# Patient Record
Sex: MALE | Race: WHITE | Employment: PART TIME | URBAN - METROPOLITAN AREA
[De-identification: names, ages, dates, MRNs, and addresses within clinical notes are randomized per-mention and may not be internally consistent; named-entity substitution may affect disease eponyms.]

---
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
## 2020-01-07 ENCOUNTER — OFFICE VISIT (OUTPATIENT)
Dept: FAMILY MEDICINE CLINIC | Age: 20
End: 2020-01-07
Payer: COMMERCIAL

## 2020-01-07 VITALS
SYSTOLIC BLOOD PRESSURE: 110 MMHG | WEIGHT: 119 LBS | HEIGHT: 65 IN | OXYGEN SATURATION: 99 % | BODY MASS INDEX: 19.83 KG/M2 | HEART RATE: 68 BPM | RESPIRATION RATE: 16 BRPM | DIASTOLIC BLOOD PRESSURE: 74 MMHG

## 2020-01-07 PROCEDURE — G8427 DOCREV CUR MEDS BY ELIG CLIN: HCPCS | Performed by: FAMILY MEDICINE

## 2020-01-07 PROCEDURE — G8420 CALC BMI NORM PARAMETERS: HCPCS | Performed by: FAMILY MEDICINE

## 2020-01-07 PROCEDURE — G8482 FLU IMMUNIZE ORDER/ADMIN: HCPCS | Performed by: FAMILY MEDICINE

## 2020-01-07 PROCEDURE — 1036F TOBACCO NON-USER: CPT | Performed by: FAMILY MEDICINE

## 2020-01-07 PROCEDURE — 99214 OFFICE O/P EST MOD 30 MIN: CPT | Performed by: FAMILY MEDICINE

## 2020-01-07 ASSESSMENT — PATIENT HEALTH QUESTIONNAIRE - PHQ9
SUM OF ALL RESPONSES TO PHQ QUESTIONS 1-9: 0
1. LITTLE INTEREST OR PLEASURE IN DOING THINGS: 0
2. FEELING DOWN, DEPRESSED OR HOPELESS: 0
SUM OF ALL RESPONSES TO PHQ QUESTIONS 1-9: 0
SUM OF ALL RESPONSES TO PHQ9 QUESTIONS 1 & 2: 0

## 2020-01-07 NOTE — PATIENT INSTRUCTIONS
irritable bowel syndrome recommendations: Investigate low-FODMAP diet.   Daily fiber supplement: Metamucil (psyllium)  Probiotic like Align daily for at least 4 weeks

## 2020-01-07 NOTE — PROGRESS NOTES
Ace Izquierdo MD, Hand Surgery (Hand, Wrist, Upper Extremity), [facility]    2. Irritable bowel syndrome with diarrhea  irritable bowel syndrome recommendations: Investigate low-FODMAP diet. Daily fiber supplement: Metamucil (psyllium)  Probiotic like Align daily for at least 4 weeks     If not improving, consider TCA (which we also discussed). Tends towards social anxiousness, which may be a result of the IBS or exacerbate it.       Plan:      F/u 6wks        Felix Ty MD

## 2020-02-03 ENCOUNTER — OFFICE VISIT (OUTPATIENT)
Dept: ORTHOPEDIC SURGERY | Age: 20
End: 2020-02-03
Payer: COMMERCIAL

## 2020-02-03 VITALS — HEIGHT: 65 IN | BODY MASS INDEX: 19.83 KG/M2 | WEIGHT: 119 LBS | RESPIRATION RATE: 16 BRPM

## 2020-02-03 PROCEDURE — G8482 FLU IMMUNIZE ORDER/ADMIN: HCPCS | Performed by: ORTHOPAEDIC SURGERY

## 2020-02-03 PROCEDURE — 99243 OFF/OP CNSLTJ NEW/EST LOW 30: CPT | Performed by: ORTHOPAEDIC SURGERY

## 2020-02-03 PROCEDURE — G8420 CALC BMI NORM PARAMETERS: HCPCS | Performed by: ORTHOPAEDIC SURGERY

## 2020-02-03 PROCEDURE — G8427 DOCREV CUR MEDS BY ELIG CLIN: HCPCS | Performed by: ORTHOPAEDIC SURGERY

## 2020-02-03 NOTE — LETTER
CONSENT TO OPERATION  AND/OR OTHER PROCEDURE(S)          PATIENT : David Carl   YOB: 2000      DATE : 2/3/20          1. I request and consent that Dr. Sammy Esteban,  and/or his associates or assistants perform an operation and/or procedures on the above patient at  [place] [address], to treat the condition(s) which appear indicated by the diagnostic studies already performed. I have been told that in general terms the nature, purpose and reasonable expectations of the operation and/or procedure(s) are:       right Scaphoid Non-Union Repair with Distal Radius Bone Graft       2. It has been explained to me by the informing physician that during the course of the operation and/or procedure(s) unforeseen conditions may be revealed that necessitate an extension of the original operation and/or procedure(s) or different operation and/or procedures than those set forth in Paragraph 1. I therefore authorize and request that my physician and/or his associates or assistants perform such operations and/or procedures as are necessary and desirable in the exercise of professional judgment. The authority granted under this Paragraph 2 shall extend to all conditions that require treatment and are known to my physician at the time the operation is commenced. 3. I have been made aware by the informing physician of certain risks and consequences that are associated with the operation and/or procedure(s) described in Paragraph 1, the reasonable alternative methods or treatment, the possible consequences, the possibility that the operation and/or procedure(s) may be unsuccessful and the possibility of complications. I understand the reasonably known risks to be:      ? Bleeding  ? Infection  ? Poor Healing  ? Possible Damage to Nerve, Vessel, Tendon/Muscle or Bone  ? Need for further Treatment/Surgery  ? Stiffness  ? Pain  ?  Residual or Recurrent Symptoms chronic conditions. Narcotics are never prescribed for use longer than one week at a time. Refills are only granted in unusual circumstances and only at Dr. Yasir Mcdowell discretion. Patients who are receiving narcotic medication from another physician or who are under pain management contracts will not be given a prescription for narcotics for any reason. I have read the above policies and understand that by agreeing to proceed with treatment by Dr. Dimple Weber and his team, that I am agreeing to abide by these policies.     Patient Name:  Lucien Cobian    Patient Signature:  _____________________________    [facility] Date:   2/3/20

## 2020-02-03 NOTE — LETTER
[address] [facility] - HAND SURGERY  Surgery Scheduling Form:      DEMOGRAPHICS:                                                                                                              .    Patient Name:  Brock Link  Patient :  2000   Patient SS#:  [id number]    Patient Phone:  [phone] (home)  Alt. Patient Phone:    Patient Address:  [address] [place] [address]    PCP:  Gerardo Rodriguez MD  Payor/Plan Subscr  Sex Relation Sub. Ins. ID Effective Group Num   1. [employer] Necessary [birth year] Male Self [id number] [address] [facility] [address]     DIAGNOSIS & PROCEDURE:                                                                                            .    Diagnosis:   right Scaphoid Non-Union  Operation:  right Scaphoid Non-Union Repair with Distal Radius Bone Graft  [CPT: 41455]  Location:  [facility]  Surgeon:  Susan Cornelius    SCHEDULING INFORMATION:                                                                                         .    Surgeon's Scheduling Instruction:  elective    RN Post-op Appt:  [] Yes   [x] No  Preferred Thursday:   [x] Yes   [] No    Requested Date:  3-26-20  OR Time:  12:30 Patient Arrival Time:  11:00  OR Time Required:  75  Minutes  Anesthesia:  General  Equipment:  Accutrack Screws (Mini & Standard), Carbon Osteotomes, K-Wires, TPS  Mini C-Arm:  Yes   Standard C-Arm:  Yes  Status:  outpatient  PAT Required:  Yes  Comments:                      Erin Ruelas. Julienne Azevedo MD  2/3/20     BILLING INFORMATION:                                                                                                    .    Procedure:       CPT Code Modifier  right Scaphoid Non-Union Repair with Distal Radius Bone Graft      .                Pre Operative Physician Prophylaxis Orders - SCIP Protocols      Pre-Operative Antibiotic Order:    No Known Allergies       [x]  ----  No Antibiotic Ordered []  ----  Give the following Antibiotic within 1 hour prior to start time:         Ancef 1 gram IV if patient is less than 200 pounds    or       Ancef 2 grams IV if patient is greater than 200 pounds    or      Vancomycin 1 gram IV (over 1 hour) if patient is allergic to           PENICILLINS or CEFALOSPORINS            Procedure: right Scaphoid Non-Union Repair with Distal Radius Bone Graft   SURG: 3-26-20    Patient: Soraida Travis  :    2000    Physician Signature:     Date: 2/3/20  Time: 3:24 PM

## 2020-02-04 ENCOUNTER — TELEPHONE (OUTPATIENT)
Dept: ORTHOPEDIC SURGERY | Age: 20
End: 2020-02-04

## 2020-02-04 NOTE — PROGRESS NOTES
The Volar Tubercle of the scaphoid is mild painful to palpation, The Anatomic Snuff-Box is mild painful to palpation. The distal radius/radial styloid is not tender to palpation. The distal ulna/ulnar styloid/DRUJ is not tender to palpation. There is not clinical evidence of skeletal deformity or mal-alignment. Radiographic Evaluation:  Radiographs are reviewed  today (3 views of the right wrist & a scaphoid view). They demonstrate evidence of an established cystic degenerative nonunion of the Mid-Waist of the scaphoid. The distal radius & ulna does not demonstrate evidence of a fracture. There is not evidence of other injury or bony fracture. 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 Plan:    I have discussed with Mr. Amanda Duncan the various treatment options for treatment of right scaphoid nonunion. We discussed the options of Closed treatment in situ with thumb-spica casting and Open repair of the nonunion with distal radial bone graft & Internal Fixation. We discussed the expected healing time and probability related to each treatment modality. We specifically discussed the anatomic considerations relevant to scaphoid fractures/nonunions, touching on the limited vascularity, possibility of avascular necrosis, and the impact that smoking has on bone healing. I have explained the pre-, phoebe- ane post-operative considerations to him.  he has elected to proceed with Open repair of the nonunion with distal radial bone graft & Internal Fixation of his right scaphoid. He has voiced an understanding of the other options available to him. I have explained the complications, limitations, expectations, alternatives, & risks of his chosen treatment.   We discussed the possibility of residual symptoms as may be related to surgical treatment of established nonunions including the possibilities of: mal-union, non-union, delayed union, persistent deformity, persistent pain, limitation of motion, future arthritic symptoms & the possible need for further treatment. We also specifically discussed risks related to any surgical procedure including: bleeding, infection, scar formation, & possible need for repeat operation. He understood our discussion and was comfortable with his decision; he was provided with appropriate expectations. I had an extensive discussion with Mr. Zakiya Villareal  and any family members present regarding the natural history, etiology, and long term consequences of this problem. I have outlined a treatment plan with them and, in my opinion, surgical intervention is indicated at this time. I have discussed with them the potential complications, limitations, expectations, alternatives, and risks of the procedure. He has had full opportunity to ask their questions. I have answered them all to his satisfaction. I feel that the patient and any present family members do understand our discussion today and he has provided informed consent for Open repair of the nonunion with distal radial bone graft & Internal Fixation of his  right scaphoid nonunion. He is specifically instructed to contact the office between now & his scheduled appointment if he has concerns related to the cast or the underlying fracture. He is welcome to call for an appointment sooner if he has any additional concerns or questions.

## 2020-02-18 ENCOUNTER — OFFICE VISIT (OUTPATIENT)
Dept: FAMILY MEDICINE CLINIC | Age: 20
End: 2020-02-18
Payer: COMMERCIAL

## 2020-02-18 VITALS
SYSTOLIC BLOOD PRESSURE: 110 MMHG | OXYGEN SATURATION: 98 % | WEIGHT: 122 LBS | HEART RATE: 65 BPM | BODY MASS INDEX: 20.33 KG/M2 | HEIGHT: 65 IN | DIASTOLIC BLOOD PRESSURE: 68 MMHG

## 2020-02-18 PROCEDURE — 99214 OFFICE O/P EST MOD 30 MIN: CPT | Performed by: FAMILY MEDICINE

## 2020-02-18 PROCEDURE — G8482 FLU IMMUNIZE ORDER/ADMIN: HCPCS | Performed by: FAMILY MEDICINE

## 2020-02-18 PROCEDURE — G8427 DOCREV CUR MEDS BY ELIG CLIN: HCPCS | Performed by: FAMILY MEDICINE

## 2020-02-18 PROCEDURE — 1036F TOBACCO NON-USER: CPT | Performed by: FAMILY MEDICINE

## 2020-02-18 PROCEDURE — G8420 CALC BMI NORM PARAMETERS: HCPCS | Performed by: FAMILY MEDICINE

## 2020-02-18 NOTE — PROGRESS NOTES
Subjective:      Patient ID: Rabia Rebollar is a 23 y.o. male. Blood pressure 110/68, pulse 65, height 5' 5\" (1.651 m), weight 122 lb (55.3 kg), SpO2 98 %. HPI Here for routine follow up of gi issues. Dx with IBS officially years ago. Maybe age 9. At last visit we discussed low FODMAP diet and recommended fiber and probiotic therapy. He reports that he has been using fiber and probiotic ([employer] brand). He does not feel foods are a trigger for his symptoms. 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 He worries that he will not be able to find a restroom when he needs one. Helps to 'get my mind off' it. Sleep at night is 'mostly good.'  occ insomnia at 4 am (2-3 times a week). Uses melatonin. No other OTC meds. No alcohol use  No tobacco or drug use. Patient Active Problem List   Diagnosis    Irritable bowel syndrome with diarrhea      Body mass index is 20.3 kg/m². Wt Readings from Last 3 Encounters:   02/18/20 122 lb (55.3 kg) (5 %, Z= -1.61)*   02/03/20 119 lb (54 kg) (4 %, Z= -1.81)*   01/07/20 119 lb (54 kg) (4 %, Z= -1.79)*     * Growth percentiles are based on CDC (Boys, 2-20 Years) data. BP Readings from Last 3 Encounters:   02/18/20 110/68   01/07/20 110/74   10/29/19 128/76      No current outpatient medications on file. No current facility-administered medications for this visit.        Immunization History   Administered Date(s) Administered    DTP/HiB 2000    DTaP vaccine 2000, 02/26/2001, 05/01/2001, 08/01/2001, 08/02/2002, 03/20/2006    HPV 9-valent Nory Pacheco) 10/31/2018, 07/12/2019    Hepatitis A Ped/Adol (Havrix, Vaqta) 10/31/2018, 07/12/2019    Hepatitis B Ped/Adol (Engerix-B, Recombivax HB) 2000, 2000, 2000, 05/01/2001    Hib vaccine 2000, 02/26/2001, 05/01/2001, 01/27/2002    Influenza A ([id number]) Vaccine PF IM

## 2020-03-09 ENCOUNTER — OFFICE VISIT (OUTPATIENT)
Dept: FAMILY MEDICINE CLINIC | Age: 20
End: 2020-03-09
Payer: COMMERCIAL

## 2020-03-09 VITALS
BODY MASS INDEX: 20.49 KG/M2 | OXYGEN SATURATION: 98 % | SYSTOLIC BLOOD PRESSURE: 108 MMHG | WEIGHT: 123 LBS | DIASTOLIC BLOOD PRESSURE: 60 MMHG | HEIGHT: 65 IN | TEMPERATURE: 98.3 F | HEART RATE: 74 BPM

## 2020-03-09 PROCEDURE — 99242 OFF/OP CONSLTJ NEW/EST SF 20: CPT | Performed by: FAMILY MEDICINE

## 2020-03-09 PROCEDURE — G8420 CALC BMI NORM PARAMETERS: HCPCS | Performed by: FAMILY MEDICINE

## 2020-03-09 PROCEDURE — G8482 FLU IMMUNIZE ORDER/ADMIN: HCPCS | Performed by: FAMILY MEDICINE

## 2020-03-09 PROCEDURE — G8427 DOCREV CUR MEDS BY ELIG CLIN: HCPCS | Performed by: FAMILY MEDICINE

## 2020-03-09 ASSESSMENT — ENCOUNTER SYMPTOMS
GASTROINTESTINAL NEGATIVE: 1
RESPIRATORY NEGATIVE: 1
EYES NEGATIVE: 1
ALLERGIC/IMMUNOLOGIC NEGATIVE: 1

## 2020-03-09 NOTE — Clinical Note
Cleared for surgery. He has some questions about his recovery time and I did not know how to answer him. I referred him to the ortho office to see if your staff is in this morning. Thanks.   JAH

## 2020-03-09 NOTE — PROGRESS NOTES
Gastrointestinal: Negative. Endocrine: Negative. Genitourinary: Negative. Musculoskeletal: Negative. Skin: Negative. Allergic/Immunologic: Negative. Neurological: Negative. Hematological: Negative. Psychiatric/Behavioral: Negative. Objective:     Vitals:    03/09/20 1024   BP: 108/60   Site: Left Upper Arm   Position: Sitting   Cuff Size: Medium Adult   Pulse: 74   Temp: 98.3 °F (36.8 °C)   TempSrc: Oral   SpO2: 98%   Weight: 123 lb (55.8 kg)   Height: 5' 5\" (1.651 m)     Physical Exam  Vitals signs and nursing note reviewed. Constitutional:       General: He is not in acute distress. Appearance: Normal appearance. He is well-developed. He is not diaphoretic. HENT:      Head: Normocephalic and atraumatic. Right Ear: Tympanic membrane, ear canal and external ear normal.      Left Ear: Tympanic membrane, ear canal and external ear normal.      Nose: Nose normal. No congestion or rhinorrhea. Mouth/Throat:      Mouth: Mucous membranes are moist.      Pharynx: Oropharynx is clear. No oropharyngeal exudate or posterior oropharyngeal erythema. Eyes:      General: No scleral icterus. Right eye: No discharge. Left eye: No discharge. Conjunctiva/sclera: Conjunctivae normal.      Pupils: Pupils are equal, round, and reactive to light. Neck:      Musculoskeletal: Normal range of motion and neck supple. Cardiovascular:      Rate and Rhythm: Normal rate and regular rhythm. Heart sounds: Normal heart sounds. No murmur. Pulmonary:      Effort: Pulmonary effort is normal. No respiratory distress. Breath sounds: Normal breath sounds. No wheezing or rales. Abdominal:      General: Bowel sounds are normal. There is no distension. Palpations: Abdomen is soft. There is no mass. Tenderness: There is no abdominal tenderness. There is no guarding or rebound. Hernia: No hernia is present.    Musculoskeletal:         General: No swelling. Right lower leg: No edema. Left lower leg: No edema. Lymphadenopathy:      Cervical: No cervical adenopathy. Skin:     General: Skin is warm and dry. Capillary Refill: Capillary refill takes less than 2 seconds. Neurological:      General: No focal deficit present. Mental Status: He is alert and oriented to person, place, and time. Mental status is at baseline. Psychiatric:         Mood and Affect: Mood normal.         Behavior: Behavior normal.         Thought Content: Thought content normal.         Judgment: Judgment normal.         Predictors of intubation difficulty:   Morbid obesity? no   Anatomically abnormal facies? no   Short, thick neck? no   Neck range of motion: normal   Dentition: No chipped, loose, or missing teeth. Lab Review   No visits with results within 2 Month(s) from this visit.    Latest known visit with results is:   Admission on 01/20/2018, Discharged on 01/20/2018   Component Date Value    WBC 01/20/2018 8.9     RBC 01/20/2018 4.86     Hemoglobin 01/20/2018 15.0     Hematocrit 01/20/2018 43.6     MCV 01/20/2018 89.8     MCH 01/20/2018 30.9     MCHC 01/20/2018 34.4     RDW 01/20/2018 13.0     Platelets 80/05/3614 224     MPV 01/20/2018 8.4     Sodium 01/20/2018 140     Potassium 01/20/2018 3.5     Chloride 01/20/2018 103     CO2 01/20/2018 25     Anion Gap 01/20/2018 12     Glucose 01/20/2018 106*    BUN 01/20/2018 10     CREATININE 01/20/2018 0.8     GFR Non- 01/20/2018 >60     GFR  01/20/2018 >60     Calcium 01/20/2018 9.5     Total Protein 01/20/2018 7.6     Alb 01/20/2018 4.7     Albumin/Globulin Ratio 01/20/2018 1.6     Total Bilirubin 01/20/2018 0.9     Alkaline Phosphatase 01/20/2018 83     ALT 01/20/2018 15     AST 01/20/2018 16     Globulin 01/20/2018 2.9     Lipase 01/20/2018 22.0     Urine Type 01/20/2018 Voided     Occult Blood Diagnostic 01/20/2018

## 2020-03-11 ENCOUNTER — ANESTHESIA EVENT (OUTPATIENT)
Dept: OPERATING ROOM | Age: 20
End: 2020-03-11
Payer: COMMERCIAL

## 2020-03-11 NOTE — PROGRESS NOTES
Phone message left to call [facility] dept at [phone]  for history review and surgery instructions. Electronically signed by Veronica Sweet RN on 3/11/20 at 9:43 AM EDT

## 2020-03-12 ENCOUNTER — ANESTHESIA (OUTPATIENT)
Dept: OPERATING ROOM | Age: 20
End: 2020-03-12
Payer: COMMERCIAL

## 2020-03-12 ENCOUNTER — HOSPITAL ENCOUNTER (OUTPATIENT)
Age: 20
Setting detail: OUTPATIENT SURGERY
Discharge: HOME | End: 2020-03-12
Attending: ORTHOPAEDIC SURGERY | Admitting: ORTHOPAEDIC SURGERY
Payer: COMMERCIAL

## 2020-03-12 ENCOUNTER — APPOINTMENT (OUTPATIENT)
Dept: GENERAL RADIOLOGY | Age: 20
End: 2020-03-12
Attending: ORTHOPAEDIC SURGERY
Payer: COMMERCIAL

## 2020-03-12 ENCOUNTER — TELEPHONE (OUTPATIENT)
Dept: ORTHOPEDIC SURGERY | Age: 20
End: 2020-03-12

## 2020-03-12 VITALS
SYSTOLIC BLOOD PRESSURE: 112 MMHG | HEIGHT: 65 IN | OXYGEN SATURATION: 96 % | TEMPERATURE: 98.2 F | DIASTOLIC BLOOD PRESSURE: 54 MMHG | BODY MASS INDEX: 20.28 KG/M2 | RESPIRATION RATE: 16 BRPM | HEART RATE: 74 BPM | WEIGHT: 121.69 LBS

## 2020-03-12 VITALS
TEMPERATURE: 96.8 F | OXYGEN SATURATION: 100 % | RESPIRATION RATE: 13 BRPM | DIASTOLIC BLOOD PRESSURE: 40 MMHG | SYSTOLIC BLOOD PRESSURE: 80 MMHG

## 2020-03-12 PROCEDURE — 6360000002 HC RX W HCPCS: Performed by: ORTHOPAEDIC SURGERY

## 2020-03-12 PROCEDURE — 7100000010 HC PHASE II RECOVERY - FIRST 15 MIN: Performed by: ORTHOPAEDIC SURGERY

## 2020-03-12 PROCEDURE — 7100000001 HC PACU RECOVERY - ADDTL 15 MIN: Performed by: ORTHOPAEDIC SURGERY

## 2020-03-12 PROCEDURE — C1713 ANCHOR/SCREW BN/BN,TIS/BN: HCPCS | Performed by: ORTHOPAEDIC SURGERY

## 2020-03-12 PROCEDURE — 2720000010 HC SURG SUPPLY STERILE: Performed by: ORTHOPAEDIC SURGERY

## 2020-03-12 PROCEDURE — 2580000003 HC RX 258: Performed by: ORTHOPAEDIC SURGERY

## 2020-03-12 PROCEDURE — 2500000003 HC RX 250 WO HCPCS: Performed by: NURSE ANESTHETIST, CERTIFIED REGISTERED

## 2020-03-12 PROCEDURE — 2709999900 HC NON-CHARGEABLE SUPPLY: Performed by: ORTHOPAEDIC SURGERY

## 2020-03-12 PROCEDURE — 7100000000 HC PACU RECOVERY - FIRST 15 MIN: Performed by: ORTHOPAEDIC SURGERY

## 2020-03-12 PROCEDURE — 3600000015 HC SURGERY LEVEL 5 ADDTL 15MIN: Performed by: ORTHOPAEDIC SURGERY

## 2020-03-12 PROCEDURE — 3700000000 HC ANESTHESIA ATTENDED CARE: Performed by: ORTHOPAEDIC SURGERY

## 2020-03-12 PROCEDURE — 6360000002 HC RX W HCPCS: Performed by: ANESTHESIOLOGY

## 2020-03-12 PROCEDURE — 3209999900 FLUORO FOR SURGICAL PROCEDURES

## 2020-03-12 PROCEDURE — C1769 GUIDE WIRE: HCPCS | Performed by: ORTHOPAEDIC SURGERY

## 2020-03-12 PROCEDURE — 2580000003 HC RX 258: Performed by: ANESTHESIOLOGY

## 2020-03-12 PROCEDURE — 3600000005 HC SURGERY LEVEL 5 BASE: Performed by: ORTHOPAEDIC SURGERY

## 2020-03-12 PROCEDURE — 6360000002 HC RX W HCPCS: Performed by: NURSE ANESTHETIST, CERTIFIED REGISTERED

## 2020-03-12 PROCEDURE — 3700000001 HC ADD 15 MINUTES (ANESTHESIA): Performed by: ORTHOPAEDIC SURGERY

## 2020-03-12 PROCEDURE — 7100000011 HC PHASE II RECOVERY - ADDTL 15 MIN: Performed by: ORTHOPAEDIC SURGERY

## 2020-03-12 DEVICE — 25.0MM STANDARD ACUTRAK® FIXATION SCREW
Type: IMPLANTABLE DEVICE | Site: WRIST | Status: FUNCTIONAL
Brand: ACUMED

## 2020-03-12 RX ORDER — PROPOFOL 10 MG/ML
INJECTION, EMULSION INTRAVENOUS PRN
Status: DISCONTINUED | OUTPATIENT
Start: 2020-03-12 | End: 2020-03-12 | Stop reason: SDUPTHER

## 2020-03-12 RX ORDER — SODIUM CHLORIDE 0.9 % (FLUSH) 0.9 %
10 SYRINGE (ML) INJECTION PRN
Status: DISCONTINUED | OUTPATIENT
Start: 2020-03-12 | End: 2020-03-12 | Stop reason: HOSPADM

## 2020-03-12 RX ORDER — HYDROCODONE BITARTRATE AND ACETAMINOPHEN 5; 325 MG/1; MG/1
1 TABLET ORAL EVERY 6 HOURS PRN
Qty: 28 TABLET | Refills: 0 | Status: SHIPPED | OUTPATIENT
Start: 2020-03-12 | End: 2020-03-19

## 2020-03-12 RX ORDER — OXYCODONE HYDROCHLORIDE AND ACETAMINOPHEN 5; 325 MG/1; MG/1
2 TABLET ORAL PRN
Status: DISCONTINUED | OUTPATIENT
Start: 2020-03-12 | End: 2020-03-12 | Stop reason: HOSPADM

## 2020-03-12 RX ORDER — CEFAZOLIN SODIUM 1 G/3ML
INJECTION, POWDER, FOR SOLUTION INTRAMUSCULAR; INTRAVENOUS PRN
Status: DISCONTINUED | OUTPATIENT
Start: 2020-03-12 | End: 2020-03-12 | Stop reason: SDUPTHER

## 2020-03-12 RX ORDER — MIDAZOLAM HYDROCHLORIDE 1 MG/ML
INJECTION INTRAMUSCULAR; INTRAVENOUS PRN
Status: DISCONTINUED | OUTPATIENT
Start: 2020-03-12 | End: 2020-03-12 | Stop reason: SDUPTHER

## 2020-03-12 RX ORDER — DEXAMETHASONE SODIUM PHOSPHATE 4 MG/ML
INJECTION, SOLUTION INTRA-ARTICULAR; INTRALESIONAL; INTRAMUSCULAR; INTRAVENOUS; SOFT TISSUE PRN
Status: DISCONTINUED | OUTPATIENT
Start: 2020-03-12 | End: 2020-03-12 | Stop reason: SDUPTHER

## 2020-03-12 RX ORDER — LIDOCAINE HYDROCHLORIDE 20 MG/ML
INJECTION, SOLUTION EPIDURAL; INFILTRATION; INTRACAUDAL; PERINEURAL PRN
Status: DISCONTINUED | OUTPATIENT
Start: 2020-03-12 | End: 2020-03-12 | Stop reason: SDUPTHER

## 2020-03-12 RX ORDER — ONDANSETRON 2 MG/ML
4 INJECTION INTRAMUSCULAR; INTRAVENOUS
Status: COMPLETED | OUTPATIENT
Start: 2020-03-12 | End: 2020-03-12

## 2020-03-12 RX ORDER — OXYCODONE HYDROCHLORIDE AND ACETAMINOPHEN 5; 325 MG/1; MG/1
1 TABLET ORAL PRN
Status: DISCONTINUED | OUTPATIENT
Start: 2020-03-12 | End: 2020-03-12 | Stop reason: HOSPADM

## 2020-03-12 RX ORDER — PROMETHAZINE HYDROCHLORIDE 25 MG/ML
6.25 INJECTION, SOLUTION INTRAMUSCULAR; INTRAVENOUS ONCE
Status: COMPLETED | OUTPATIENT
Start: 2020-03-12 | End: 2020-03-12

## 2020-03-12 RX ORDER — SODIUM CHLORIDE 9 MG/ML
INJECTION, SOLUTION INTRAVENOUS CONTINUOUS
Status: DISCONTINUED | OUTPATIENT
Start: 2020-03-12 | End: 2020-03-12 | Stop reason: HOSPADM

## 2020-03-12 RX ORDER — ONDANSETRON 2 MG/ML
INJECTION INTRAMUSCULAR; INTRAVENOUS PRN
Status: DISCONTINUED | OUTPATIENT
Start: 2020-03-12 | End: 2020-03-12 | Stop reason: SDUPTHER

## 2020-03-12 RX ORDER — PHENYLEPHRINE HCL IN 0.9% NACL 1 MG/10 ML
SYRINGE (ML) INTRAVENOUS PRN
Status: DISCONTINUED | OUTPATIENT
Start: 2020-03-12 | End: 2020-03-12 | Stop reason: SDUPTHER

## 2020-03-12 RX ORDER — FENTANYL CITRATE 50 UG/ML
25 INJECTION, SOLUTION INTRAMUSCULAR; INTRAVENOUS EVERY 5 MIN PRN
Status: DISCONTINUED | OUTPATIENT
Start: 2020-03-12 | End: 2020-03-12 | Stop reason: HOSPADM

## 2020-03-12 RX ORDER — SODIUM CHLORIDE 0.9 % (FLUSH) 0.9 %
10 SYRINGE (ML) INJECTION EVERY 12 HOURS SCHEDULED
Status: DISCONTINUED | OUTPATIENT
Start: 2020-03-12 | End: 2020-03-12 | Stop reason: HOSPADM

## 2020-03-12 RX ORDER — FENTANYL CITRATE 50 UG/ML
INJECTION, SOLUTION INTRAMUSCULAR; INTRAVENOUS PRN
Status: DISCONTINUED | OUTPATIENT
Start: 2020-03-12 | End: 2020-03-12 | Stop reason: SDUPTHER

## 2020-03-12 RX ADMIN — LIDOCAINE HYDROCHLORIDE 80 MG: 20 INJECTION, SOLUTION EPIDURAL; INFILTRATION; INTRACAUDAL; PERINEURAL at 12:08

## 2020-03-12 RX ADMIN — CEFAZOLIN SODIUM 2000 MG: 1 INJECTION, POWDER, FOR SOLUTION INTRAMUSCULAR; INTRAVENOUS at 12:13

## 2020-03-12 RX ADMIN — SODIUM CHLORIDE: 9 INJECTION, SOLUTION INTRAVENOUS at 13:09

## 2020-03-12 RX ADMIN — FENTANYL CITRATE 25 MCG: 50 INJECTION, SOLUTION INTRAMUSCULAR; INTRAVENOUS at 14:10

## 2020-03-12 RX ADMIN — PROMETHAZINE HYDROCHLORIDE 6.25 MG: 25 INJECTION INTRAMUSCULAR; INTRAVENOUS at 15:43

## 2020-03-12 RX ADMIN — PROPOFOL 250 MG: 10 INJECTION, EMULSION INTRAVENOUS at 12:08

## 2020-03-12 RX ADMIN — HYDROMORPHONE HYDROCHLORIDE 0.5 MG: 1 INJECTION, SOLUTION INTRAMUSCULAR; INTRAVENOUS; SUBCUTANEOUS at 13:59

## 2020-03-12 RX ADMIN — SODIUM CHLORIDE: 9 INJECTION, SOLUTION INTRAVENOUS at 11:29

## 2020-03-12 RX ADMIN — Medication 25 MCG: at 13:09

## 2020-03-12 RX ADMIN — ONDANSETRON 4 MG: 2 INJECTION INTRAMUSCULAR; INTRAVENOUS at 12:43

## 2020-03-12 RX ADMIN — DEXAMETHASONE SODIUM PHOSPHATE 4 MG: 4 INJECTION, SOLUTION INTRAMUSCULAR; INTRAVENOUS at 12:16

## 2020-03-12 RX ADMIN — FENTANYL CITRATE 50 MCG: 50 INJECTION INTRAMUSCULAR; INTRAVENOUS at 12:08

## 2020-03-12 RX ADMIN — FENTANYL CITRATE 50 MCG: 50 INJECTION INTRAMUSCULAR; INTRAVENOUS at 12:13

## 2020-03-12 RX ADMIN — MIDAZOLAM 2 MG: 1 INJECTION INTRAMUSCULAR; INTRAVENOUS at 12:04

## 2020-03-12 RX ADMIN — Medication 25 MCG: at 13:08

## 2020-03-12 RX ADMIN — ONDANSETRON 4 MG: 2 INJECTION INTRAMUSCULAR; INTRAVENOUS at 14:00

## 2020-03-12 ASSESSMENT — PULMONARY FUNCTION TESTS
PIF_VALUE: 8
PIF_VALUE: 9
PIF_VALUE: 3
PIF_VALUE: 6
PIF_VALUE: 3
PIF_VALUE: 9
PIF_VALUE: 3
PIF_VALUE: 3
PIF_VALUE: 8
PIF_VALUE: 3
PIF_VALUE: 8
PIF_VALUE: 3
PIF_VALUE: 9
PIF_VALUE: 3
PIF_VALUE: 9
PIF_VALUE: 3
PIF_VALUE: 8
PIF_VALUE: 9
PIF_VALUE: 8
PIF_VALUE: 1
PIF_VALUE: 2
PIF_VALUE: 3
PIF_VALUE: 8
PIF_VALUE: 2
PIF_VALUE: 3
PIF_VALUE: 3
PIF_VALUE: 8
PIF_VALUE: 3
PIF_VALUE: 8
PIF_VALUE: 9
PIF_VALUE: 0
PIF_VALUE: 9
PIF_VALUE: 8
PIF_VALUE: 3
PIF_VALUE: 0
PIF_VALUE: 8
PIF_VALUE: 3
PIF_VALUE: 9
PIF_VALUE: 2
PIF_VALUE: 3
PIF_VALUE: 2
PIF_VALUE: 8
PIF_VALUE: 9
PIF_VALUE: 8
PIF_VALUE: 3
PIF_VALUE: 3
PIF_VALUE: 8
PIF_VALUE: 3
PIF_VALUE: 0
PIF_VALUE: 2
PIF_VALUE: 8
PIF_VALUE: 8
PIF_VALUE: 9
PIF_VALUE: 3

## 2020-03-12 ASSESSMENT — PAIN - FUNCTIONAL ASSESSMENT
PAIN_FUNCTIONAL_ASSESSMENT: 0-10
PAIN_FUNCTIONAL_ASSESSMENT: ACTIVITIES ARE NOT PREVENTED
PAIN_FUNCTIONAL_ASSESSMENT: PREVENTS OR INTERFERES SOME ACTIVE ACTIVITIES AND ADLS
PAIN_FUNCTIONAL_ASSESSMENT: ACTIVITIES ARE NOT PREVENTED
PAIN_FUNCTIONAL_ASSESSMENT: 0-10

## 2020-03-12 ASSESSMENT — PAIN DESCRIPTION - ONSET
ONSET: ON-GOING
ONSET: AWAKENED FROM SLEEP
ONSET: ON-GOING
ONSET: ON-GOING

## 2020-03-12 ASSESSMENT — PAIN DESCRIPTION - PAIN TYPE
TYPE: SURGICAL PAIN

## 2020-03-12 ASSESSMENT — PAIN DESCRIPTION - DESCRIPTORS
DESCRIPTORS: ACHING
DESCRIPTORS: ACHING;DISCOMFORT;SORE
DESCRIPTORS: ACHING
DESCRIPTORS: ACHING;SHARP
DESCRIPTORS: ACHING
DESCRIPTORS: ACHING

## 2020-03-12 ASSESSMENT — PAIN SCALES - GENERAL
PAINLEVEL_OUTOF10: 6
PAINLEVEL_OUTOF10: 4
PAINLEVEL_OUTOF10: 8
PAINLEVEL_OUTOF10: 4

## 2020-03-12 ASSESSMENT — PAIN DESCRIPTION - LOCATION
LOCATION: WRIST

## 2020-03-12 ASSESSMENT — PAIN DESCRIPTION - PROGRESSION
CLINICAL_PROGRESSION: GRADUALLY IMPROVING
CLINICAL_PROGRESSION: NOT CHANGED
CLINICAL_PROGRESSION: GRADUALLY IMPROVING

## 2020-03-12 ASSESSMENT — PAIN DESCRIPTION - ORIENTATION
ORIENTATION: RIGHT

## 2020-03-12 ASSESSMENT — ENCOUNTER SYMPTOMS: SHORTNESS OF BREATH: 0

## 2020-03-12 ASSESSMENT — PAIN DESCRIPTION - FREQUENCY
FREQUENCY: CONTINUOUS

## 2020-03-12 NOTE — LETTER
[facility]  [address] [address]  Phone: [phone]  Fax: [phone]    Winifred Mullins MD        March 13, 2020     Patient: Mauri Whitt   YOB: 2000   Date of Visit: 3/12/2020       To Whom it May Concern:    Kristie Wolff had right hand surgery on 3/12/2020. If you have any questions or concerns, please don't hesitate to call.     Sincerely,               Winifred Mullins MD

## 2020-03-12 NOTE — ANESTHESIA PRE PROCEDURE
98.3 °F (36.8 °C)   TempSrc:  Oral   SpO2:  98%   Weight: 123 lb (55.8 kg) 121 lb 11.1 oz (55.2 kg)   Height: 5' 5\" (1.651 m) 5' 5\" (1.651 m)                                              BP Readings from Last 3 Encounters:   03/12/20 131/72   03/09/20 108/60   02/18/20 110/68       NPO Status: Time of last liquid consumption: 2315                        Time of last solid consumption: 2315                        Date of last liquid consumption: 03/11/20                        Date of last solid food consumption: 03/11/20    BMI:   Wt Readings from Last 3 Encounters:   03/12/20 121 lb 11.1 oz (55.2 kg) (5 %, Z= -1.64)*   03/09/20 123 lb (55.8 kg) (6 %, Z= -1.56)*   02/18/20 122 lb (55.3 kg) (5 %, Z= -1.61)*     * Growth percentiles are based on CDC (Boys, 2-20 Years) data. Body mass index is 20.25 kg/m². CBC:   Lab Results   Component Value Date    WBC 8.9 01/20/2018    RBC 4.86 01/20/2018    HGB 15.0 01/20/2018    HCT 43.6 01/20/2018    MCV 89.8 01/20/2018    RDW 13.0 01/20/2018     01/20/2018       CMP:   Lab Results   Component Value Date     01/20/2018    K 3.5 01/20/2018     01/20/2018    CO2 25 01/20/2018    BUN 10 01/20/2018    CREATININE 0.8 01/20/2018    GFRAA >60 01/20/2018    AGRATIO 1.6 01/20/2018    LABGLOM >60 01/20/2018    GLUCOSE 106 01/20/2018    PROT 7.6 01/20/2018    CALCIUM 9.5 01/20/2018    BILITOT 0.9 01/20/2018    ALKPHOS 83 01/20/2018    AST 16 01/20/2018    ALT 15 01/20/2018       POC Tests: No results for input(s): POCGLU, POCNA, POCK, POCCL, POCBUN, POCHEMO, POCHCT in the last 72 hours.     Coags:   Lab Results   Component Value Date    PROTIME 11.7 01/20/2018    INR 1.04 01/20/2018       HCG (If Applicable): No results found for: PREGTESTUR, PREGSERUM, HCG, HCGQUANT     ABGs: No results found for: PHART, PO2ART, HED0KYT, DYG1DNY, BEART, S7OYKVEV     Type & Screen (If Applicable):  No results found for: LABABO, [address]    Anesthesia Evaluation  Patient summary

## 2020-03-12 NOTE — LETTER
[facility]  [address] [address]  Phone: [phone]  Fax: [phone]    Diane Eisenberg MD        March 13, 2020     Patient: Ankit Montes   YOB: 2000   Date of Visit: 3/12/2020       To Whom it May Concern:    Jimmy Chaudhry had right hand surgery on 3/12/2020. He may return to work with no use of right hand for 10-12 weeks after surgery. If you have any questions or concerns, please don't hesitate to call.     Sincerely,               Diane Eisenberg MD

## 2020-03-12 NOTE — ANESTHESIA POSTPROCEDURE EVALUATION
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        Anesthesia Post Evaluation    Patient location during evaluation: PACU  Patient participation: complete - patient participated  Level of consciousness: awake and alert  Airway patency: patent  Nausea & Vomiting: no nausea and no vomiting  Complications: no  Cardiovascular status: hemodynamically stable  Respiratory status: acceptable  Hydration status: stable

## 2020-03-12 NOTE — H&P
Pre-operative Update of H&P:    I  have seen & examined Mr. Stephanie Pham related solely to his hand and upper extremity conditions, prior to the scheduled procedure on the date of his surgery. The indications for the planned surgical procedure & and his upper-extremity condition are unchanged.

## 2020-03-13 NOTE — OP NOTE
anesthesia was  induced and the right upper extremity was prepped and draped in the  usual sterile fashion. After Esmarch exsanguination, the pneumatic tourniquet was inflated to  250 mmHg. A curvilinear incision was fashioned over the dorsal radial  aspect of the wrist incorporating the line of the scaphoid and over the  dorsum of the distal radius staying radial to Anton's tubercle. Dissection was carried very carefully through the subcutaneous tissues  identifying and protecting the neurovascular structures. Particularly,  the radial sensory nerve branches and subcutaneous veins were protected. The interval between the first and second extensor compartments was  identified and the 1,2-ICSRA (supraretinacular artery was identified as  the pedicle for the planned bone graft flap). This was meticulously  identified and carefully protected throughout the procedure to preserve  it. 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 It was significantly fibrous and  degenerative. This was all debrided back to stable healthy bone. Unfortunately, there was both proximal and distal punctate bleeding at  the conclusion of debridement. This left a large cavity within the mid  waist of the scaphoid. Attention was turned to the dorsum of the distal  radius along the course of the 1,2-ICSRA. The necessary bone graft was  identified and marked. It was harvested using K-wires and sharp  osteotomes retrieving a solid piece of cortical cancellous bone as a  pedicle flap which was raised. This was rotated on its pedicle and  contoured to fit the defect. It was then packed securely in the defect  and held in position provisionally with K-wires. Using an Acutrak standard  screw set, a guidewire was placed from proximal to distal down the  central axis of the scaphoid, checked an orthogonal radiographic views.    This was overrimmed to accommodate a 25 mm standard Acutrak screw which  was inserted under direct visual and radiographic guidance. Screw was  inserted to the planned depth gaining excellent purchase both proximally  and distally making sure not to displace or extrude the graft. With the  screw in place, excellent compression and fixation achieved. All  provisional wires were removed. Final fluoroscopic imaging was obtained  demonstrating excellent alignment and position of the graft. The  scaphoid was in a very good alignment. Hardware was all in appropriate  position and dimension. The wound was irrigated copiously with sterile  saline for irrigation and the pneumatic tourniquet deflated after a  period of 40 minutes elevation. Hemostasis was obtained with direct  pressure and electrocautery. The fingers were immediately pink and  well-perfused. The wound was closed in layers with interrupted  absorbable suture routinely. The wound was dressed with Adaptic, dry  sterile dressing, and a bulky short arm thumb spica splint was applied. The patient was awakened from anesthesia having tolerated the procedure  without apparent complication. He was returned to the recovery room in  stable condition. At the conclusion of the procedure, all needle, instruments, and sponge  counts were correct.         Corrina Ramirez MD    D: 03/12/2020 13:05:46       T: 03/12/2020 21:56:21     [id number]  Job#: [id number]     Doc#: [id number]    CC:

## 2020-03-17 ENCOUNTER — TELEPHONE (OUTPATIENT)
Dept: ORTHOPEDIC SURGERY | Age: 20
End: 2020-03-17

## 2020-03-17 NOTE — TELEPHONE ENCOUNTER
Spoke with patient. Patient describes possible bruising at knuckle. Advised that this can be normal. Also explained that the numbness he is experiencing is most likely from swelling putting pressure on the carpal tunnel. He is advised to elevate, take NSAIDS and wiggle fingers to help decrease swelling. Patient will call back if his symptoms worsen or fail to improve.  Will keep appointment for Friday with Crystal Franz

## 2020-03-17 NOTE — TELEPHONE ENCOUNTER
Pt is calling about his hand is numb today and it was fine yesterday. He wanted to make sure this was ok for it to be numb.

## 2020-03-20 ENCOUNTER — OFFICE VISIT (OUTPATIENT)
Dept: ORTHOPEDIC SURGERY | Age: 20
End: 2020-03-20
Payer: COMMERCIAL

## 2020-03-20 VITALS — RESPIRATION RATE: 16 BRPM | WEIGHT: 121 LBS | HEIGHT: 65 IN | BODY MASS INDEX: 20.16 KG/M2

## 2020-03-20 PROBLEM — S62.021A CLOSED DISPLACED FRACTURE OF MIDDLE THIRD OF NAVICULAR BONE OF RIGHT WRIST: Status: ACTIVE | Noted: 2020-03-20

## 2020-03-20 PROCEDURE — 29075 APPL CST ELBW FNGR SHORT ARM: CPT | Performed by: PHYSICIAN ASSISTANT

## 2020-03-20 PROCEDURE — 99024 POSTOP FOLLOW-UP VISIT: CPT | Performed by: PHYSICIAN ASSISTANT

## 2020-03-20 NOTE — PROGRESS NOTES
I applied a Short Arm Thumb-Spica Fiberglass Cast to Santhosh Barone's Right, Thumb. I applied casting stockinette,  1 rolls of padding in an overlapping fashion. Annalee Carrizales requested pink color fiberglass. I rolled 2 rolls of fiberglass in an overlapping fashion. His  Right, Thumb was maintained in a 0 degree Neutral Alignment. At the conclusion of the procedure, Tee Barone's nail beds were pink in color, the extremity is warm to the touch. Capillary refill is less than 2 seconds. Annalee Carrizales was instructed in proper care of cast.  Do not get wet, keep all items out of cast.  If cast is painful please make appointment to get checked. He was also briefed on circulation compromise. If digits are cold, blue, and tingling patient must must seek care. If after hours patient is to go to Emergency Room. During office hours patient must come in to office.

## 2020-03-23 ENCOUNTER — TELEPHONE (OUTPATIENT)
Dept: ORTHOPEDIC SURGERY | Age: 20
End: 2020-03-23

## 2020-03-25 ENCOUNTER — TELEPHONE (OUTPATIENT)
Dept: ORTHOPEDIC SURGERY | Age: 20
End: 2020-03-25

## 2020-04-22 ENCOUNTER — OFFICE VISIT (OUTPATIENT)
Dept: ORTHOPEDIC SURGERY | Age: 20
End: 2020-04-22
Payer: COMMERCIAL

## 2020-04-22 VITALS — TEMPERATURE: 96.2 F | HEIGHT: 65 IN | BODY MASS INDEX: 20.17 KG/M2 | WEIGHT: 121.03 LBS | RESPIRATION RATE: 16 BRPM

## 2020-04-22 PROCEDURE — 99024 POSTOP FOLLOW-UP VISIT: CPT | Performed by: PHYSICIAN ASSISTANT

## 2020-04-22 PROCEDURE — 29085 APPL CAST HAND&LWR FOREARM: CPT | Performed by: PHYSICIAN ASSISTANT

## 2020-04-22 NOTE — PROGRESS NOTES
Short arm thumb spica cast applied to right arm. Patient tolerated well. Capillary refill was less than 2 seconds. Patient was instructed on ice and elevation. Cast care instructions given.

## 2020-05-20 ENCOUNTER — OFFICE VISIT (OUTPATIENT)
Dept: ORTHOPEDIC SURGERY | Age: 20
End: 2020-05-20
Payer: COMMERCIAL

## 2020-05-20 ENCOUNTER — TELEPHONE (OUTPATIENT)
Dept: ORTHOPEDIC SURGERY | Age: 20
End: 2020-05-20

## 2020-05-20 VITALS — RESPIRATION RATE: 18 BRPM | BODY MASS INDEX: 20.17 KG/M2 | HEIGHT: 65 IN | TEMPERATURE: 96.9 F | WEIGHT: 121.03 LBS

## 2020-05-20 PROCEDURE — 99024 POSTOP FOLLOW-UP VISIT: CPT | Performed by: PHYSICIAN ASSISTANT

## 2020-05-20 PROCEDURE — L3908 WHO COCK-UP NONMOLDE PRE OTS: HCPCS | Performed by: PHYSICIAN ASSISTANT

## 2020-05-21 ENCOUNTER — TELEPHONE (OUTPATIENT)
Dept: ORTHOPEDIC SURGERY | Age: 20
End: 2020-05-21

## 2020-05-21 ENCOUNTER — HOSPITAL ENCOUNTER (OUTPATIENT)
Dept: CT IMAGING | Age: 20
Discharge: HOME | End: 2020-05-21
Payer: COMMERCIAL

## 2020-05-21 PROCEDURE — 73200 CT UPPER EXTREMITY W/O DYE: CPT

## 2020-05-22 ENCOUNTER — TELEPHONE (OUTPATIENT)
Dept: ORTHOPEDIC SURGERY | Age: 20
End: 2020-05-22

## 2020-06-01 ENCOUNTER — TELEPHONE (OUTPATIENT)
Dept: ORTHOPEDIC SURGERY | Age: 20
End: 2020-06-01

## 2020-06-05 ENCOUNTER — HOSPITAL ENCOUNTER (OUTPATIENT)
Dept: OCCUPATIONAL THERAPY | Age: 20
Setting detail: THERAPIES SERIES
Discharge: HOME | End: 2020-06-05
Payer: COMMERCIAL

## 2020-06-05 PROCEDURE — 97110 THERAPEUTIC EXERCISES: CPT

## 2020-06-05 PROCEDURE — 97530 THERAPEUTIC ACTIVITIES: CPT

## 2020-06-05 PROCEDURE — 97022 WHIRLPOOL THERAPY: CPT

## 2020-06-05 PROCEDURE — 97165 OT EVAL LOW COMPLEX 30 MIN: CPT

## 2020-06-05 NOTE — FLOWSHEET NOTE
occurs. Therapeutic Exercises  Resistance / level Sets/sec Reps Notes                                                                                Neuromuscular Re-ed / Therapeutic Activities                                                 Manual Intervention                                                     Patient education:  Eval, goals, plan of care, HEP      Home Exercise Program: Written and verbal HEP instructions provided and reviewed:  · Passive ROM of wrist flexion, extension, and radial/ulnar deviation  · Wearing of compression sleeve at night as tolerated for edema control  · Additional strengthening program TBD  · Pt verbalized understanding    Therapeutic Exercise and NMR:  [x] (70131) Provided verbal/tactile cueing for activities related to strengthening, flexibility, endurance, ROM  for improvements in scapular, scapulothoracic and UE control with self care, reaching, carrying, lifting, house/yardwork, driving/computer work.    [] (10949) Provided verbal/tactile cueing for activities related to improving balance, coordination, kinesthetic sense, posture, motor skill, proprioception  to assist with  scapular, scapulothoracic and UE control with self care, reaching, carrying, lifting, house/yardwork, driving/computer work.   [] Comments:    Therapeutic Activities:    [x] (70973 or 98135) Provided verbal/tactile cueing for activities related to improving balance, coordination, kinesthetic sense, posture, motor skill, proprioception and motor activation to allow for proper function of scapular, scapulothoracic and UE control with self care, carrying, lifting, driving/computer work  [] Comments:    Home Exercise Program:    [x] (97872) Reviewed/Progressed HEP activities related to strengthening, flexibility, endurance, ROM of scapular, scapulothoracic and UE control with self care, reaching, carrying, lifting, house/yardwork, driving/computer work  [] (67916) Reviewed/Progressed HEP activities will increase wrist flexion by 15 degrees by 7/5/20. []? Progressing: []? Met: []? Not Met: []? Adjusted  3. Pt will increase wrist extension by 10 degrees by 7/5/20. []? Progressing: []? Met: []? Not Met: []? Adjusted     Long Term Goals: To be achieved by thais  1. Pt will will report a QuickDASH Symptom Severity Scale score of 20% or less indicating increased safety and functional independence in desired occupational pursuits by discharge. []? Progressing: []? Met: []? Not Met: []? Adjusted    Progression Towards Functional goals:  [] Patient is progressing as expected towards functional goals listed. [] Progression is slowed due to complexities listed. [] Progression has been slowed due to co-morbidities. [x] Plan just implemented, too soon to assess goals progression  [] All goals are met  [] Other:     ASSESSMENT:  See eval    Treatment/Activity Tolerance:  [x] Patient tolerated treatment well [] Patient limited by fatique  [] Patient limited by pain  [] Patient limited by other medical complications  [] Other:     Prognosis: [x] Good [] Fair  [] Poor    Patient Requires Follow-up: [x] Yes  [] No    PLAN: See eval  [] Continue per plan of care [] Alter current plan (see comments)  [x] Plan of care initiated [] Hold pending MD visit [] Discharge    Electronically signed by: DILAN Lozada/L [id number]      Note: If patient does not return for scheduled/ recommended follow up visits, this note will serve as a discharge from care along with most recent update on progress.

## 2020-06-05 NOTE — PROGRESS NOTES
Pt will will report a QuickDASH Symptom Severity Scale score of 20% or less indicating increased safety and functional independence in desired occupational pursuits by discharge.   [] Progressing: [] Met: [] Not Met: [] Adjusted    Electronically signed by:  DILAN Lawrence/EFE [id number]

## 2020-06-12 ENCOUNTER — HOSPITAL ENCOUNTER (OUTPATIENT)
Dept: OCCUPATIONAL THERAPY | Age: 20
Setting detail: THERAPIES SERIES
Discharge: HOME | End: 2020-06-12
Payer: COMMERCIAL

## 2020-06-12 PROCEDURE — 97022 WHIRLPOOL THERAPY: CPT

## 2020-06-12 PROCEDURE — 97110 THERAPEUTIC EXERCISES: CPT

## 2020-06-12 PROCEDURE — 97140 MANUAL THERAPY 1/> REGIONS: CPT

## 2020-06-12 NOTE — FLOWSHEET NOTE
[facility] - Outpatient Occupational Therapy      Hand Therapy Daily Treatment Note  Date:  2020    Patient: Mady Morejon   : 2000   MRN: [id number]  Referring Physician:  Dr. 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)                                                 Insurance information:   [facility] -  combined  Date of Injury: ~5 years prior  Date of Surgery: 3/12/20      Visit # Insurance Allowable Date Range         Date of Patient follow up with Physician: n/a    Progress Note: []  Yes  [x]  No  Next due by: Visit #10      Latex Allergy:  [x]No      []Yes  Pacemaker:  [x] No       [] Yes     Preferred Language for Healthcare:   [x]English       []other:    Pain level:  2/10     SUBJECTIVE:  Pt reports he has been wearing the compression sleeve mostly during the day since it also feels good when his wrist is aching. He believes his wrist is already slightly less swollen. Functional Disability Index: Quick DASH: raw score = 31; dysfunction = 45%    OBJECTIVE:      PROM AROM     L R L R   Pronation        WFL   Supination        WFL   Wrist Flexion        55   Wrist Extension        5   Radial Deviation        5   Ulnar Deviation       25   CMC Abduction       WFL   Composite Flexion        WFL       Hand Assessment Left  Right  Comments    9 Hole Peg Test (s) 22 24      Strength (lbs) 65, 78, 90 20, 15, 23 Pt instructed not to provoke pain   Lateral Pinch Strength (lbs)         Palmar Pinch Strength (lbs)         Tip Pinch Strength (lbs) 10 2 Pt reports pain at base of thumb   Opposition (Y/N)   Y 0 cm tip of thumb to lateral 5th digit         RESTRICTIONS/PRECAUTIONS: ORIF    Exercises/Interventions: Session initiated with MLD for edema control, followed by 8 minutes of AROM in fluidotherapy for further blood blow, pain relief, and soft tissue benefits.  Pt

## 2020-06-15 ENCOUNTER — HOSPITAL ENCOUNTER (OUTPATIENT)
Dept: OCCUPATIONAL THERAPY | Age: 20
Setting detail: THERAPIES SERIES
Discharge: HOME | End: 2020-06-15
Payer: COMMERCIAL

## 2020-06-15 PROCEDURE — 97530 THERAPEUTIC ACTIVITIES: CPT

## 2020-06-15 PROCEDURE — 97110 THERAPEUTIC EXERCISES: CPT

## 2020-06-15 PROCEDURE — 97022 WHIRLPOOL THERAPY: CPT

## 2020-06-15 PROCEDURE — 97140 MANUAL THERAPY 1/> REGIONS: CPT

## 2020-06-15 NOTE — FLOWSHEET NOTE
when opening hand and drifts into ulnar deviation at wrist.  Pt. Educated on normal resting hand posture and maintenance of arches and not flattening hand too forcefully. Patient verbalized understanding. Patient then worked on radial deviation and functional wrist strength with hand to mouth patterns holding cup in right hand. Patient reports he has not been feeding himself with right hand even though he is right handed. Noted improved hand posture during functional activity. Patient encouraged to start using right hand to feed himself. Patient also provided with glove to decrease swelling in hand and instructed to wear compression sleeve over glove on forearm to decrease edema. Verbalized and demonstrated understanding donning glove and sleeve. Patient then worked on paper crumbles to increase intrinsic strength and completed noting bit stretch radial side of thumb and forearm.      Therapeutic Exercises  Resistance / level Sets/sec Reps Notes   Short arc exercises for wrist radial and ulnar deviation   1 10 Forearm supported on blue wedge   1# free weight 1 10 Forearm supported on blue wedge; added to HEP with pt verbalizing understanding    1 8 Forearm supported on blue wedge    3 1 Forearm supported on blue wedge   Paper crumbles  1 3                                              Neuromuscular Re-ed / Therapeutic Activities                                                 Manual Intervention       Joint mobilization       Scar massage       MLD                                Patient education:  Eval, goals, plan of care, HEP      Home Exercise Program: Written and verbal HEP instructions provided and reviewed:  · Passive ROM of wrist flexion, extension, and radial/ulnar deviation  · Wearing of compression sleeve at night (or day if preferred) as tolerated for edema control glove with sleeve  · AROM of wrist flexion, extension, and radial/ulnar deviation with can or bottle for resistance  · Pt verbalized reducing/eliminating soft tissue swelling/inflammation/restriction, improving soft tissue extensibility and allowing for proper ROM for normal function with self care, reaching, carrying, lifting, house/yardwork, driving/computer work  [] Comments:    ADL Training:  [] (26346) Provided self-care/home management training related to activities of daily living and compensatory training, and/or use of adaptive equipment   [] Comments:     Splinting:  [] Fabrication of:   [] (86718) Orthotic/Prosthetic Management, subsequent encounter  [] (96823) Orthotic management and training (fitting and assessment)  [] Comments:    Modalities:  fluidotherapy    Charges:  Timed Code Treatment Minutes: 50   Total Treatment Minutes: 60     [] EVAL (LOW) 24024   [] OT Re-eval (70165)  [] EVAL (MOD) 62911   [] EVAL (HIGH) 23284       [x] Flower (19819) x    1 [] SJTLL(31339)  [] NMR (39523) x     [] Estim (attended) (55787)   [x] Manual ([id number]) x    1 [] US (01677)  [x] TA (11665) x    1 [] Paraffin (49390)  [] ADL  (62218) x    [] Splint/L code:    [] Estim (unattended) (52411)  [x] Fluidotherapy (48705)  [] Other:    GOALS:   Patient stated goal: more motion and strength, less pain  []? Progressing: []? Met: []? Not Met: []? Adjusted     Therapist goals for Patient:   Short Term Goals: To be achieved in: 30 days  1. Pt will increase L hand  strength to 50 lbs by 7/5/20. []? Progressing: []? Met: []? Not Met: []? Adjusted  2. Pt will increase wrist flexion by 15 degrees by 7/5/20. []? Progressing: []? Met: []? Not Met: []? Adjusted  3. Pt will increase wrist extension by 10 degrees by 7/5/20. []? Progressing: []? Met: []? Not Met: []? Adjusted     Long Term Goals: To be achieved by dishcharge  1. Pt will will report a QuickDASH Symptom Severity Scale score of 20% or less indicating increased safety and functional independence in desired occupational pursuits by discharge. []? Progressing: []? Met: []?  Not Met: []?

## 2020-06-19 ENCOUNTER — HOSPITAL ENCOUNTER (OUTPATIENT)
Dept: OCCUPATIONAL THERAPY | Age: 20
Setting detail: THERAPIES SERIES
Discharge: HOME | End: 2020-06-19
Payer: COMMERCIAL

## 2020-06-19 PROCEDURE — 97022 WHIRLPOOL THERAPY: CPT

## 2020-06-19 PROCEDURE — 97140 MANUAL THERAPY 1/> REGIONS: CPT

## 2020-06-19 PROCEDURE — 97110 THERAPEUTIC EXERCISES: CPT

## 2020-06-19 NOTE — FLOWSHEET NOTE
R wrist- 15.5 cm)      RESTRICTIONS/PRECAUTIONS: ORIF    Exercises/Interventions: Session initiated with inspection of R wrist with decreased edema noted; pt circumferential wrist measurement taken with R wrist circumference decreased from 16 cm to 15.5 cm. Pt then completed 10 minutes of AROM in fluidotherapy for blood blow, pain relief, and soft tissue benefits. Pt then received brief joint mobilization into wrist flexion/extension and radial/ulnar deviation with scar massage. Pt then completed the below TE for strengthening and ROM. For wrist flexion/extension during grasp, pt grasped cable cross bar in both hands with no weight to rotate into wrist flexion and extension with pt reporting \"good stretch\" and increased ROM noted. Pt assumed modified plank position in stance at table for partial weight bearing through wrists for 10 seconds x4 with slight LE weight shift for gentle stretch. Pt reported he feels his  strength has improved; hand  dynamometer measured  strength at 39 pounds in the R hand (increased from 23 pounds) and 92 pounds in the L hand.        Therapeutic Exercises  Resistance / level Sets/sec Reps Notes   Short arc exercises for wrist radial and ulnar deviation  small hammer 1 10 Forearm supported on blue wedge      Dart throw [place] theraband 1 10    Finger abduction [place] theraputty 1 5 Make ball in R hand, roll snake, form loop, and stretch into abduction   Wrist stabilization during lateral pull-down 35# 1 10 Pt maintained wrist in neutral with no reports of pain                        Neuromuscular Re-ed / Therapeutic Activities                                                 Manual Intervention       Joint mobilization       Scar massage                                       Patient education:  goals, plan of care, HEP      Home Exercise Program: Written and verbal HEP instructions provided and reviewed:  · Passive ROM of wrist flexion, extension, and radial/ulnar deviation  · Wearing of compression sleeve at night (or day if preferred) as tolerated for edema control glove with sleeve  · AROM of wrist flexion, extension, and radial/ulnar deviation with can or bottle for resistance  · Pt verbalized understanding  · Glove at night  With compression sleeve for edema  · Paper crumbles three times a day times 3 reps  · Self feed using right hand. · Dart throwing exercises     Therapeutic Exercise and NMR:  [x] (76732) Provided verbal/tactile cueing for activities related to strengthening, flexibility, endurance, ROM  for improvements in scapular, scapulothoracic and UE control with self care, reaching, carrying, lifting, house/yardwork, driving/computer work.    [] (68043) Provided verbal/tactile cueing for activities related to improving balance, coordination, kinesthetic sense, posture, motor skill, proprioception  to assist with  scapular, scapulothoracic and UE control with self care, reaching, carrying, lifting, house/yardwork, driving/computer work.   [] Comments:    Therapeutic Activities:    [] (89093 or 50832) Provided verbal/tactile cueing for activities related to improving balance, coordination, kinesthetic sense, posture, motor skill, proprioception and motor activation to allow for proper function of scapular, scapulothoracic and UE control with self care, carrying, lifting, driving/computer work  [] Comments:    Home Exercise Program:    [x] (20014) Reviewed/Progressed HEP activities related to strengthening, flexibility, endurance, ROM of scapular, scapulothoracic and UE control with self care, reaching, carrying, lifting, house/yardwork, driving/computer work  [] (16507) Reviewed/Progressed HEP activities related to improving balance, coordination, kinesthetic sense, posture, motor skill, proprioception of scapular, scapulothoracic and UE control with self care, reaching, carrying, lifting, house/yardwork, driving/computer work    [] Comments:    Manual achieved by thais  1. Pt will will report a QuickDASH Symptom Severity Scale score of 20% or less indicating increased safety and functional independence in desired occupational pursuits by discharge. [x]? Progressing: []? Met: []? Not Met: []? Adjusted    Progression Towards Functional goals:  [x] Patient is progressing as expected towards functional goals listed. [] Progression is slowed due to complexities listed. [] Progression has been slowed due to co-morbidities. [] Plan just implemented, too soon to assess goals progression  [] All goals are met  [] Other:     ASSESSMENT:  See eval    Treatment/Activity Tolerance:  [x] Patient tolerated treatment well [] Patient limited by fatique  [] Patient limited by pain  [] Patient limited by other medical complications  [] Other:     Prognosis: [x] Good [] Fair  [] Poor    Patient Requires Follow-up: [x] Yes  [] No    PLAN: See eval  [x] Continue per plan of care [] Alter current plan (see comments)  [] Plan of care initiated [] Hold pending MD visit [] Discharge    Electronically signed by:     DILAN Cohn/EFE [id number]        Note: If patient does not return for scheduled/ recommended follow up visits, this note will serve as a discharge from care along with most recent update on progress.

## 2020-06-22 ENCOUNTER — HOSPITAL ENCOUNTER (OUTPATIENT)
Dept: OCCUPATIONAL THERAPY | Age: 20
Setting detail: THERAPIES SERIES
Discharge: HOME | End: 2020-06-22
Payer: COMMERCIAL

## 2020-06-22 PROCEDURE — 97140 MANUAL THERAPY 1/> REGIONS: CPT

## 2020-06-22 PROCEDURE — 97018 PARAFFIN BATH THERAPY: CPT

## 2020-06-22 PROCEDURE — 97110 THERAPEUTIC EXERCISES: CPT

## 2020-06-22 NOTE — FLOWSHEET NOTE
goals:  [x] Patient is progressing as expected towards functional goals listed. [] Progression is slowed due to complexities listed. [] Progression has been slowed due to co-morbidities. [] Plan just implemented, too soon to assess goals progression  [] All goals are met  [] Other:     ASSESSMENT:  See eval    Treatment/Activity Tolerance:  [x] Patient tolerated treatment well [] Patient limited by fatique  [] Patient limited by pain  [] Patient limited by other medical complications  [] Other:     Prognosis: [x] Good [] Fair  [] Poor    Patient Requires Follow-up: [x] Yes  [] No    PLAN: See eval  [x] Continue per plan of care [] Alter current plan (see comments)  [] Plan of care initiated [] Hold pending MD visit [] Discharge    Electronically signed by:                 Note: If patient does not return for scheduled/ recommended follow up visits, this note will serve as a discharge from care along with most recent update on progress.

## 2020-06-24 ENCOUNTER — HOSPITAL ENCOUNTER (OUTPATIENT)
Dept: OCCUPATIONAL THERAPY | Age: 20
Setting detail: THERAPIES SERIES
Discharge: HOME | End: 2020-06-24
Payer: COMMERCIAL

## 2020-06-24 PROCEDURE — 97018 PARAFFIN BATH THERAPY: CPT

## 2020-06-24 PROCEDURE — 97140 MANUAL THERAPY 1/> REGIONS: CPT

## 2020-06-24 PROCEDURE — 97110 THERAPEUTIC EXERCISES: CPT

## 2020-06-24 NOTE — FLOWSHEET NOTE
Progressing: []? Met: []? Not Met: []? Adjusted    Progression Towards Functional goals:  [x] Patient is progressing as expected towards functional goals listed. [] Progression is slowed due to complexities listed. [] Progression has been slowed due to co-morbidities. [] Plan just implemented, too soon to assess goals progression  [] All goals are met  [] Other:     ASSESSMENT:  See eval    Treatment/Activity Tolerance:  [x] Patient tolerated treatment well [] Patient limited by fatique  [] Patient limited by pain  [] Patient limited by other medical complications  [] Other:     Prognosis: [x] Good [] Fair  [] Poor    Patient Requires Follow-up: [x] Yes  [] No    PLAN: See eval  [x] Continue per plan of care [] Alter current plan (see comments)  [] Plan of care initiated [] Hold pending MD visit [] Discharge    Electronically signed by:         DILAN Pinzon/L [id number]          Note: If patient does not return for scheduled/ recommended follow up visits, this note will serve as a discharge from care along with most recent update on progress.

## 2020-07-01 ENCOUNTER — HOSPITAL ENCOUNTER (OUTPATIENT)
Dept: OCCUPATIONAL THERAPY | Age: 20
Setting detail: THERAPIES SERIES
Discharge: HOME | End: 2020-07-01
Payer: COMMERCIAL

## 2020-07-01 PROCEDURE — 97530 THERAPEUTIC ACTIVITIES: CPT

## 2020-07-01 PROCEDURE — 97140 MANUAL THERAPY 1/> REGIONS: CPT

## 2020-07-01 PROCEDURE — 97110 THERAPEUTIC EXERCISES: CPT

## 2020-07-01 NOTE — FLOWSHEET NOTE
[facility] - Outpatient Occupational Therapy      Hand Therapy Daily Treatment Note  Date:  2020    Patient: Glenna Glass   : 2000   MRN: [id number]  Referring Physician:  Dr. 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 Pt reports he was able to complete a clean and press following last session. Functional Disability Index: Quick DASH: raw score = 31; dysfunction = 45%    OBJECTIVE:      PROM AROM     L R L R   Pronation        WFL   Supination        WFL   Wrist Flexion        55   Wrist Extension        5   Radial Deviation        5   Ulnar Deviation       25   CMC Abduction       WFL   Composite Flexion        WFL       Hand Assessment Left  Right  Comments    9 Hole Peg Test (s) 22 24      Strength (lbs) 65, 78, 90  : 92 20, 15, 23  6: 39 Pt instructed not to provoke pain   Lateral Pinch Strength (lbs)         Palmar Pinch Strength (lbs)         Tip Pinch Strength (lbs) 10 2 Pt reports pain at base of thumb   Opposition (Y/N)   Y 0 cm tip of thumb to lateral 5th digit     Edema: R wrist circumference- 16 cm, L wrist- 15.5 cm (20: R wrist- 15.5 cm)      RESTRICTIONS/PRECAUTIONS: ORIF    Exercises/Interventions:  Treatment focused on increasing ROM, strength, and functional use with decreased pain.  Session initiated with AAROM and joint mobilization into radial deviation, wrist extension, and wrist flexion without ulnar deviation, followed by short arc TE below. Pt then completed tricep dips x6 at tabletop for wrist flexion stretch and weight bearing with exercise progression to toe touch tricep dips x8 at parallel bars for decreased wrist flexion with increased weight bearing. Pt then completed half pull-ups x8 from seated position with UEs extended to parallel bars for non-manual distraction and strengthening. Next, pt participated in functional intrinsic strengthening task during game using tool to retrieve and place pieces, followed by retrieving pieces from red theraputty. Pt reported fatigue in hand following, but no pain. Pt completed cable cross TE below, followed [facility]. Pt transitioned between quadriped and child's pose x8 each for weight bearing and to stretch wrist extension and shoulders following TE. Pt had cramp in obliques that improved with stretching. Session concluded with cat/cow yoga in [facility] to further stretch trunk and weight bear through wrists.        Therapeutic Exercises  Resistance / level Sets/sec Reps Notes   Short arc exercises for wrist radial  deviation  small mallet 1 10 Forearm supported on blue wedge         Pink theraputty 1 5 Make ball in R hand, roll snake, form loop, and stretch into abduction   35# 1 10 Pt maintained wrist in neutral with no reports of pain   Short arc forearm supination with mallet   10       Wrist stabilization during shoulder flexion on cable cross 35# 1 10       Pronated bicep curl to shoulder press 25# 1 10    Row with forearm in neutral on cable cross 25# 1 10                  Manual Intervention       Joint mobilization                                              Patient education:  goals, plan of care, HEP      Home Exercise Program: Written and verbal HEP instructions provided and reviewed:  · Passive ROM of wrist flexion, extension, and radial/ulnar deviation  · Wearing of compression sleeve at night (or day if preferred) as tolerated for edema control glove with sleeve  · AROM of wrist flexion, extension, and radial/ulnar deviation with can or bottle for resistance  · Pt verbalized understanding  · Glove at night  With compression sleeve for edema  · Paper crumbles three times a day times 3 reps  · Self feed using right hand. · Dart throwing exercises  · Quadriped yoga poses for weight bearing and wrist extension     Therapeutic Exercise and NMR:  [x] (50981) Provided verbal/tactile cueing for activities related to strengthening, flexibility, endurance, ROM  for improvements in scapular, scapulothoracic and UE control with self care, reaching, carrying, lifting, house/yardwork, driving/computer work.    [] (13498) Provided verbal/tactile cueing for activities related to improving balance, coordination, kinesthetic sense, posture, motor skill, proprioception  to assist with  scapular, scapulothoracic and UE control with self care, reaching, carrying, lifting, house/yardwork, driving/computer work.   [] Comments:    Therapeutic Activities:    [x] (85706 or 23115) Provided verbal/tactile cueing for activities related to improving balance, coordination, kinesthetic sense, posture, motor skill, proprioception and motor activation to allow for proper function of scapular, scapulothoracic and UE control with self care, carrying, lifting, driving/computer work  [] Comments:    Home Exercise Program:    [x] (99717) Reviewed/Progressed HEP activities related to strengthening, flexibility, endurance, ROM of scapular, scapulothoracic and UE control with self care, reaching, carrying, lifting, house/yardwork, driving/computer work  [] (39827) Reviewed/Progressed HEP activities related to improving balance, coordination, kinesthetic sense, posture, motor skill, proprioception of scapular, scapulothoracic and UE control with self care, reaching, Progressing: []? Met: []? Not Met: []? Adjusted     Long Term Goals: To be achieved by thais  1. Pt will will report a QuickDASH Symptom Severity Scale score of 20% or less indicating increased safety and functional independence in desired occupational pursuits by discharge. [x]? Progressing: []? Met: []? Not Met: []? Adjusted    Progression Towards Functional goals:  [x] Patient is progressing as expected towards functional goals listed. [] Progression is slowed due to complexities listed. [] Progression has been slowed due to co-morbidities. [] Plan just implemented, too soon to assess goals progression  [] All goals are met  [] Other:     ASSESSMENT:  See eval    Treatment/Activity Tolerance:  [x] Patient tolerated treatment well [] Patient limited by fatique  [] Patient limited by pain  [] Patient limited by other medical complications  [] Other:     Prognosis: [x] Good [] Fair  [] Poor    Patient Requires Follow-up: [x] Yes  [] No    PLAN: See eval  [x] Continue per plan of care [] Alter current plan (see comments)  [] Plan of care initiated [] Hold pending MD visit [] Discharge    Electronically signed by:         DILAN Bush/L [id number]          Note: If patient does not return for scheduled/ recommended follow up visits, this note will serve as a discharge from care along with most recent update on progress.

## 2020-07-09 ENCOUNTER — HOSPITAL ENCOUNTER (OUTPATIENT)
Dept: OCCUPATIONAL THERAPY | Age: 20
Setting detail: THERAPIES SERIES
Discharge: HOME | End: 2020-07-09
Payer: COMMERCIAL

## 2020-07-09 PROCEDURE — 97110 THERAPEUTIC EXERCISES: CPT

## 2020-07-09 PROCEDURE — 97140 MANUAL THERAPY 1/> REGIONS: CPT

## 2020-07-09 PROCEDURE — 97018 PARAFFIN BATH THERAPY: CPT

## 2020-07-09 NOTE — FLOWSHEET NOTE
[facility] - Outpatient Occupational Therapy      Hand Therapy Daily Treatment Note  Date:  2020    Patient: Gerard Blount   : 2000   MRN: [id number]  Referring Physician:  Dr. 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)                                                  Insurance information:   [facility] - 30 combined  Date of Injury: ~5 years prior  Date of Surgery: 3/12/20      Visit # Insurance Allowable Date Range         Date of Patient follow up with Physician: n/a    Progress Note: []  Yes  [x]  No  Next due by: Visit #10      Latex Allergy:  [x]No      []Yes  Pacemaker:  [x] No       [] Yes     Preferred Language for Healthcare:   [x]English       []other:    Pain level:  3/10     SUBJECTIVE: Patient reports a little soreness up ulnar aspect of hand, patient reports he has been doing regular push ups on floor, therapist recommended patient place towel under heel of hand to accommodate for decreased rom. Patient also reports some \"clicking\" at wrist and elbow    Functional Disability Index: Quick DASH: raw score = 31; dysfunction = 45%    OBJECTIVE:  Noted hyper externsion at elbow and scapular winging with hyper mobility in shoulder.        PROM AROM     L R L R   Pronation        WFL   Supination        WFL   Wrist Flexion        55   Wrist Extension        5   Radial Deviation        5   Ulnar Deviation       25   CMC Abduction       WFL   Composite Flexion        WFL       Hand Assessment Left  Right  Comments    9 Hole Peg Test (s) 22 24      Strength (lbs) 65, 78, 90  : 92 20, 15, 23  19: 39 Pt instructed not to provoke pain   Lateral Pinch Strength (lbs)         Palmar Pinch Strength (lbs)         Tip Pinch Strength (lbs) 10 2 Pt reports pain at base of thumb   Opposition (Y/N)   Y 0 cm tip of thumb to lateral 5th digit     Edema: R wrist circumference- 16 cm, L wrist- 15.5 cm (6/19/20: R wrist- 15.5 cm)      RESTRICTIONS/PRECAUTIONS: ORIF    Exercises/Interventions:  Treatment focused on soft tissue mobilization dorsal radial aspect   of forearm  increasing ROM, strength, and functional use with decreased pain. Warm up with paraffin bath for soft tissue benefits followed by use of hawk  for soft tissue mobilization dorsal aspect of forearm. 4 minutes on arm bike for warm up, 2 minutes forward pedal and 2 minutes backwards. Next, patient completed stretches to work on forearm supination and scapular stability and external  Rotation of humerus . Patient completed 10 modified on elevated surface  push ups for weight bearing, elbow flexion and wrist extension. Patient then worked on increasing strength and rom in shoulder using green theraband and pvc pipe working on mid range control, eccentric control for proximal stability during reach and forearm function for lifting . Patient educated in and completed shoulder external rotation with use of green theraband. Next, green putty was used with patient to increase strength in right hand focusing on intrinsic hand function particularly for metacarpel flexion and strengthening of lumbricals and MCPJ adduction for strengthening of interossei. Home exercise program with putty was updated and pt demonstrated good technique. Plan of care was discussed with patient concerning home exercise and getting back to work. Requested patient have MD assess anatomical status and work readiness, feel patient has functional strength to return to work.     . .     Therapeutic Exercises  Resistance / level Sets/sec Reps Notes   Short arc exercises for wrist radial  deviation  small mallet 1 10 Forearm supported on blue wedge         [place]ink theraputty 1 5 Make ball in R hand, roll snake, form loop, and stretch into abduction   35# 1 10 Pt maintained wrist in neutral with no reports of pain   Short arc forearm supination with mallet   10       Wrist stabilization during shoulder flexion on cable cross 35# 1 10       Pronated bicep curl to shoulder press 25# 1 10    Row with forearm in neutral on cable cross 25# 1 10                  Manual Intervention       Joint mobilization                                              Patient education:  goals, plan of care, HEP      Home Exercise Program: Written and verbal HEP instructions provided and reviewed:  · Passive ROM of wrist flexion, extension, and radial/ulnar deviation  · Wearing of compression sleeve at night (or day if preferred) as tolerated for edema control glove with sleeve  · AROM of wrist flexion, extension, and radial/ulnar deviation with can or bottle for resistance  · Pt verbalized understanding  · Glove at night  With compression sleeve for edema  · Paper crumbles three times a day times 3 reps  · Self feed using right hand. · Dart throwing exercises  · Quadriped yoga poses for weight bearing and wrist extension     Therapeutic Exercise and NMR:  [x] (19993) Provided verbal/tactile cueing for activities related to strengthening, flexibility, endurance, ROM  for improvements in scapular, scapulothoracic and UE control with self care, reaching, carrying, lifting, house/yardwork, driving/computer work.    [] (30228) Provided verbal/tactile cueing for activities related to improving balance, coordination, kinesthetic sense, posture, motor skill, proprioception  to assist with  scapular, scapulothoracic and UE control with self care, reaching, carrying, lifting, house/yardwork, driving/computer work.   [] Comments:    Therapeutic Activities:    [x] (55181 or 12768) Provided verbal/tactile cueing for activities related to improving balance, coordination, kinesthetic sense, posture, motor skill, proprioception and motor activation to allow for proper function of scapular, scapulothoracic and UE control with self care, carrying, lifting, driving/computer work  [] Patient stated goal: more motion and strength, less pain  [x]? Progressing: []? Met: []? Not Met: []? Adjusted     Therapist goals for Patient:   Short Term Goals: To be achieved in: 30 days  1. Pt will increase L hand  strength to 50 lbs by 7/5/20. [x]? Progressing: []? Met: []? Not Met: []? Adjusted  2. Pt will increase wrist flexion by 15 degrees by 7/5/20. [x]? Progressing: []? Met: []? Not Met: []? Adjusted  3. Pt will increase wrist extension by 10 degrees by 7/5/20. [x]? Progressing: []? Met: []? Not Met: []? Adjusted     Long Term Goals: To be achieved by thais  1. Pt will will report a QuickDASH Symptom Severity Scale score of 20% or less indicating increased safety and functional independence in desired occupational pursuits by discharge. [x]? Progressing: []? Met: []? Not Met: []? Adjusted    Progression Towards Functional goals:  [x] Patient is progressing as expected towards functional goals listed. [] Progression is slowed due to complexities listed. [] Progression has been slowed due to co-morbidities. [] Plan just implemented, too soon to assess goals progression  [] All goals are met  [] Other:     ASSESSMENT:  See eval    Treatment/Activity Tolerance:  [x] Patient tolerated treatment well [] Patient limited by fatique  [] Patient limited by pain  [] Patient limited by other medical complications  [] Other:     Prognosis: [x] Good [] Fair  [] Poor    Patient Requires Follow-up: [x] Yes  [] No    PLAN: See eval  [x] Continue per plan of care [] Alter current plan (see comments)  [] Plan of care initiated [] Hold pending MD visit [] Discharge    Electronically signed by:                      Note: If patient does not return for scheduled/ recommended follow up visits, this note will serve as a discharge from care along with most recent update on progress.

## 2020-07-13 ENCOUNTER — HOSPITAL ENCOUNTER (OUTPATIENT)
Dept: OCCUPATIONAL THERAPY | Age: 20
Setting detail: THERAPIES SERIES
Discharge: HOME | End: 2020-07-13
Payer: COMMERCIAL

## 2020-07-13 PROCEDURE — 97140 MANUAL THERAPY 1/> REGIONS: CPT

## 2020-07-13 PROCEDURE — 97110 THERAPEUTIC EXERCISES: CPT

## 2020-07-13 PROCEDURE — 97018 PARAFFIN BATH THERAPY: CPT

## 2020-07-13 NOTE — FLOWSHEET NOTE
[facility] - Outpatient Occupational Therapy      Hand Therapy Daily Treatment Note  Date:  2020    Patient: Felicita Patel   : 2000   MRN: [id number]  Referring Physician:  Dr. 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)                                                  Insurance information:   [facility] -  combined  Date of Injury: ~5 years prior  Date of Surgery: 3/12/20      Visit # Insurance Allowable Date Range         Date of Patient follow up with Physician: n/a    Progress Note: []  Yes  [x]  No  Next due by: Visit #10      Latex Allergy:  [x]No      []Yes  Pacemaker:  [x] No       [] Yes     Preferred Language for Healthcare:   [x]English       []other:    Pain level:  3/10     SUBJECTIVE: Patient reports a little soreness up ulnar aspect of hand, patient reports he played Frisbee golf this weekend and made his hand a little sore. Functional Disability Index: Quick DASH: raw score = 31; dysfunction = 45%    OBJECTIVE:  Noted hyper externsion at elbow and scapular winging with hyper mobility in shoulder.        PROM AROM     L R L R   Pronation        WFL   Supination        WFL   Wrist Flexion        55   Wrist Extension        5   Radial Deviation        5   Ulnar Deviation       25   CMC Abduction       WFL   Composite Flexion        WFL       Hand Assessment Left  Right  Comments    9 Hole Peg Test (s) 22 24      Strength (lbs) 65, 78, 90  6: 92 20, 15, 23  6: 39 Pt instructed not to provoke pain   Lateral Pinch Strength (lbs)         Palmar Pinch Strength (lbs)         Tip Pinch Strength (lbs) 10 2 Pt reports pain at base of thumb   Opposition (Y/N)   Y 0 cm tip of thumb to lateral 5th digit     Edema: R wrist circumference- 16 cm, L wrist- 15.5 cm (20: R wrist- 15.5 cm)      RESTRICTIONS/PRECAUTIONS: ORIF    Exercises/Interventions: Treatment focused on increasing ROM, and functional use of right hand with decreased pain. Warm up with paraffin bath for soft tissue benefits followed by use of hawk  for soft tissue mobilization dorsal aspect of forearm. 4 minutes on arm bike for warm up, 2 minutes forward pedal and 2 minutes backwards. Scapular exercises were then done with patient palming physioball against wall and rolling ball across vertical surface to improve scapular strength. Patient completed 10 platform walks on knees for weight bearing, elbow flexion and wrist extension and to improve shoulder strength and stability. Noted hyperextension at elbow, but demonstrated improved eccentric control with verbal cues. Patient scapular winging decreased since last session. Patient then worked on increasing strength and ROM in shoulder using green theraband performing horizontal rows, shoulder extension and shoulder diagonal exercises to increase strength of rotator cuff muscles (patient performed 20 reps of each exercise). Patient completed shoulder external rotation with use of green theraband and pvc pipe working on mid range control, eccentric control for proximal stability during reach and forearm function for lifting. Patient scheduled appointment with MD to assess anatomical status and work readiness for this upcoming Friday. Updated home exercise program to include modified platform walks on elevated surface, patient demonstrated good technique and verbalized understanding.    . .     Therapeutic Exercises  Resistance / level Sets/sec Reps Notes      1 10 Forearm supported on blue wedge          1 5 Make ball in R hand, roll snake, form loop, and stretch into abduction   35# 1 10 Pt maintained wrist in neutral with no reports of pain   Short arc forearm supination with mallet   10       Wrist stabilization during shoulder flexion on cable cross 35# 1 10       Pronated bicep curl to shoulder press 25# 1 10    Row with forearm in neutral on cable cross 25# 1 10                  Manual Intervention       Joint mobilization                                              Patient education:  goals, plan of care, HEP      Home Exercise Program: Written and verbal HEP instructions provided and reviewed:  · Passive ROM of wrist flexion, extension, and radial/ulnar deviation  · Wearing of compression sleeve at night (or day if preferred) as tolerated for edema control glove with sleeve  · AROM of wrist flexion, extension, and radial/ulnar deviation with can or bottle for resistance  · Pt verbalized understanding  · Glove at night  With compression sleeve for edema  · Paper crumbles three times a day times 3 reps  · Self feed using right hand. · Dart throwing exercises  · Quadriped yoga poses for weight bearing and wrist extension     Therapeutic Exercise and NMR:  [x] (65765) Provided verbal/tactile cueing for activities related to strengthening, flexibility, endurance, ROM  for improvements in scapular, scapulothoracic and UE control with self care, reaching, carrying, lifting, house/yardwork, driving/computer work.    [] (81881) Provided verbal/tactile cueing for activities related to improving balance, coordination, kinesthetic sense, posture, motor skill, proprioception  to assist with  scapular, scapulothoracic and UE control with self care, reaching, carrying, lifting, house/yardwork, driving/computer work.   [] Comments:    Therapeutic Activities:    [] (03384 or 68661) Provided verbal/tactile cueing for activities related to improving balance, coordination, kinesthetic sense, posture, motor skill, proprioception and motor activation to allow for proper function of scapular, scapulothoracic and UE control with self care, carrying, lifting, driving/computer work  [] Comments:    Home Exercise Program:    [x] (92148) Reviewed/Progressed HEP activities related to strengthening, flexibility, endurance, ROM of scapular, scapulothoracic and UE control with self care, reaching, carrying, lifting, house/yardwork, driving/computer work  [] (55348) Reviewed/Progressed HEP activities related to improving balance, coordination, kinesthetic sense, posture, motor skill, proprioception of scapular, scapulothoracic and UE control with self care, reaching, carrying, lifting, house/yardwork, driving/computer work    [] Comments:    Manual Treatments:  PROM / STM / Oscillations-Mobs:  G-I, II, III, IV (PA's, Inf., Post.)  [x] (04669) Provided manual therapy to mobilize soft tissue/joints of cervical/CT, scapular GHJ and UE for the purpose of modulating pain, promoting relaxation,  increasing ROM, reducing/eliminating soft tissue swelling/inflammation/restriction, improving soft tissue extensibility and allowing for proper ROM for normal function with self care, reaching, carrying, lifting, house/yardwork, driving/computer work  [] Comments:    ADL Training:  [] (46299) Provided self-care/home management training related to activities of daily living and compensatory training, and/or use of adaptive equipment   [] Comments:     Splinting:  [] Fabrication of:   [] (08110) Orthotic/Prosthetic Management, subsequent encounter  [] (12989) Orthotic management and training (fitting and assessment)  [] Comments:    Modalities: paraffin     Charges:  Timed Code Treatment Minutes: 45   Total Treatment Minutes: 35     [] EVAL (LOW) 26457   [] OT Re-eval (97478)  [] EVAL (MOD) 16142   [] EVAL (HIGH) 97180       [x] Flower ([id number]) x    1 [] KTSCB(06229)  [] NMR (18476) x     [] Estim (attended) (40551)   [x] Manual ([id number]) x    1 [] US (86944)  [] TA (24810) x       [x] Paraffin (80283)  [] ADL  (87 209 24 60) x    [] Splint/L code:    [] Estim (unattended) (20074)  [] Fluidotherapy (51833)  [] Other:    GOALS:   Patient stated goal: more motion and strength, less pain  [x]? Progressing: []? Met: []? Not Met: []? Adjusted     Therapist goals for Patient:   Short Term Goals:  To be achieved in: 30

## 2020-07-15 ENCOUNTER — HOSPITAL ENCOUNTER (OUTPATIENT)
Dept: OCCUPATIONAL THERAPY | Age: 20
Setting detail: THERAPIES SERIES
Discharge: HOME | End: 2020-07-15
Payer: COMMERCIAL

## 2020-07-15 PROCEDURE — 97110 THERAPEUTIC EXERCISES: CPT

## 2020-07-15 PROCEDURE — 97530 THERAPEUTIC ACTIVITIES: CPT

## 2020-07-15 PROCEDURE — 97140 MANUAL THERAPY 1/> REGIONS: CPT

## 2020-07-15 NOTE — FLOWSHEET NOTE
Opposition (Y/N)   Y 0 cm tip of thumb to lateral 5th digit     Edema: R wrist circumference- 16 cm, L wrist- 15.5 cm   6/19/20: R wrist- 15.5 cm  7/15/20: R wrist- 15.5 cm      RESTRICTIONS/PRECAUTIONS: ORIF    Exercises/Interventions:  Treatment focused on increasing ROM, and functional use of right hand with decreased pain. Session initiated with assessment of pt progress with results above. Pt has significantly increased R hand strength and ROM and decreased pain. Pt met all goals except for wrist flexion goal, but wrist flexion is [facility] and has not impacted pt's daily activities in the past two weeks. Pt continues to have some numbness in dorsal aspect of hand between MCPs of 1st and 2nd digits, but understands this may take additional time to fully heal and does not report any functional impacts of decreased sensation in this area. Pt has follow-up with ortho on Friday and may be suitable for discharge at next session pending physician assessment. Following progress assessment, pt received hawk  for STM on dorsal aspect of distal forearm, followed by joint mobilization and manual stretch into wrist flexion. Pt reported he enjoys working on cars and is not sure how he will do with tool use, so activity involving placing/removing bolts with wrench was completed without difficulty. 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 Pt had no difficulty performing tasks, but reported slight pain at R elbow.      Therapeutic Exercises  Resistance / level Sets/sec Reps Notes                 Manual Intervention       Joint mobilization                     Hawk  to radial aspect of forearm over soft tissue                         Patient education:  goals, plan of care, HEP      Home Exercise Program: Written and verbal HEP instructions provided and reviewed:  · Passive ROM of wrist flexion, extension, and radial/ulnar deviation  · Wearing of compression sleeve at night (or day if preferred) as tolerated for edema control glove with sleeve  · AROM of wrist flexion, extension, and radial/ulnar deviation with can or bottle for resistance  · Pt verbalized understanding  · Glove at night  With compression sleeve for edema  · Paper crumbles three times a day times 3 reps  · Self feed using right hand. · Dart throwing exercises  · Quadriped yoga poses for weight bearing and wrist extension     Therapeutic Exercise and NMR:  [x] (11970) Provided verbal/tactile cueing for activities related to strengthening, flexibility, endurance, ROM  for improvements in scapular, scapulothoracic and UE control with self care, reaching, carrying, lifting, house/yardwork, driving/computer work.    [] (87064) Provided verbal/tactile cueing for activities related to improving balance, coordination, kinesthetic sense, posture, motor skill, proprioception  to assist with  scapular, scapulothoracic and UE control with self care, reaching, carrying, lifting, house/yardwork, driving/computer work.   [] Comments:    Therapeutic Activities:    [] (02656 or 32411) Provided verbal/tactile cueing for activities related to improving balance, coordination, kinesthetic sense, posture, motor skill, proprioception and motor activation to allow for proper function of scapular, scapulothoracic and UE control with self care, carrying, lifting, driving/computer work  [] Comments:    Home Exercise Program:    [] (82480) Reviewed/Progressed HEP activities related to strengthening, flexibility, endurance, ROM of scapular, scapulothoracic and UE control with self care, reaching, carrying, lifting, house/yardwork, driving/computer work  [] (12581) Reviewed/Progressed HEP activities related to improving balance, coordination, kinesthetic sense, posture, motor skill, proprioception of scapular, scapulothoracic and UE control with self care, reaching, carrying, lifting, house/yardwork, driving/computer work    [] Comments:    Manual Treatments:  PROM / STM / Oscillations-Mobs:  G-I, II, III, IV (PA's, Inf., Post.)  [x] (57980) Provided manual therapy to mobilize soft tissue/joints of cervical/CT, scapular GHJ and UE for the purpose of modulating pain, promoting relaxation,  increasing ROM, reducing/eliminating soft tissue swelling/inflammation/restriction, improving soft tissue extensibility and allowing for proper ROM for normal function with self care, reaching, carrying, lifting, house/yardwork, driving/computer work  [] Comments:    ADL Training:  [] (19057) Provided self-care/home management training related to activities of daily living and compensatory training, and/or use of adaptive equipment   [] Comments:     Splinting:  [] Fabrication of:   [] (50788) Orthotic/Prosthetic Management, subsequent encounter  [] (42066) Orthotic management and training (fitting and assessment)  [] Comments:    Modalities: paraffin     Charges:  Timed Code Treatment Minutes: 60   Total Treatment Minutes: 60     [] EVAL (LOW) 49213   [] OT Re-eval (24279)  [] EVAL (MOD) 89277   [] EVAL (HIGH) 42160       [x] Flower (55191) x    1 [] ZN(76565)  [] NMR (90309) x     [] Estim (attended) (23975)   [x] Manual ([id number]) x    1 [] US (70136)  [x] TA (89966) x     2  [] Paraffin (49534)  [] ADL  (20 649 24 60) x    [] Splint/L code:    [] Estim (unattended) (09893)  [] Fluidotherapy (32802)  [] Other:    GOALS:   Patient stated goal: more motion and strength, less pain  [x]? Progressing: []? Met: []? Not Met: []? Adjusted     Therapist goals for Patient:   Short Term Goals: To be achieved in: 30 days  1. Pt will increase L hand  strength to 50 lbs by 7/5/20. []? Progressing: [x]? Met: []? Not Met: []? Adjusted  2. Pt will increase wrist flexion by 15 degrees by 7/5/20. [x]? Progressing: []?  Met: []? Not Met: []? Adjusted  3. Pt will increase wrist extension by 10 degrees by 7/5/20. []? Progressing: [x]? Met: []? Not Met: []? Adjusted     Long Term Goals: To be achieved by thais  1. Pt will will report a QuickDASH Symptom Severity Scale score of 20% or less indicating increased safety and functional independence in desired occupational pursuits by discharge. []? Progressing: [x]? Met: []? Not Met: []? Adjusted    Progression Towards Functional goals:  [x] Patient is progressing as expected towards functional goals listed. [] Progression is slowed due to complexities listed. [] Progression has been slowed due to co-morbidities. [] Plan just implemented, too soon to assess goals progression  [] All goals are met  [] Other:     ASSESSMENT:  See eval    Treatment/Activity Tolerance:  [x] Patient tolerated treatment well [] Patient limited by fatique  [] Patient limited by pain  [] Patient limited by other medical complications  [] Other:     Prognosis: [x] Good [] Fair  [] Poor    Patient Requires Follow-up: [x] Yes  [] No    PLAN: See eval  [x] Continue per plan of care [] Alter current plan (see comments)  [] Plan of care initiated [] Hold pending MD visit [] Discharge    Electronically signed by:              Farooq Milligan OTR/L [id number]      Note: If patient does not return for scheduled/ recommended follow up visits, this note will serve as a discharge from care along with most recent update on progress.

## 2020-07-17 ENCOUNTER — OFFICE VISIT (OUTPATIENT)
Dept: ORTHOPEDIC SURGERY | Age: 20
End: 2020-07-17
Payer: COMMERCIAL

## 2020-07-17 VITALS — RESPIRATION RATE: 16 BRPM | WEIGHT: 121 LBS | BODY MASS INDEX: 20.16 KG/M2 | HEIGHT: 65 IN | TEMPERATURE: 99.5 F

## 2020-07-17 PROCEDURE — G8420 CALC BMI NORM PARAMETERS: HCPCS | Performed by: ORTHOPAEDIC SURGERY

## 2020-07-17 PROCEDURE — 1036F TOBACCO NON-USER: CPT | Performed by: ORTHOPAEDIC SURGERY

## 2020-07-17 PROCEDURE — 99213 OFFICE O/P EST LOW 20 MIN: CPT | Performed by: ORTHOPAEDIC SURGERY

## 2020-07-17 PROCEDURE — G8427 DOCREV CUR MEDS BY ELIG CLIN: HCPCS | Performed by: ORTHOPAEDIC SURGERY

## 2020-07-17 NOTE — Clinical Note
Dear  Geoffrey Mccabe MD,    Thank you very much for your referral or Mr. Hi Hawkins to me for evaluation and treatment of his Hand & Wrist condition. I appreciate your confidence in me and thank you for allowing me the opportunity to care for your patients. If I can be of any further assistance to you on this or any other patient, please do not hesitate to contact me. Sincerely,    Wendy Hyman.  Los Restrepo MD

## 2020-07-18 NOTE — PROGRESS NOTES
Mr. Abdirashid Bell returns today in follow-up of his right right scaphoid nonunion repair which was fully healed approximately 2 months ago. He has been treated with Physical Therapy. He has noted decreased discomfort and decreased swelling. Continually improving function    He notes no symptoms of numbness, tingling, no symptoms related to perfusion. The patient's , past medical history, medications, allergies,  family history, social history, and review of systems have been reviewed and are recorded in the chart. Physical Exam:  Vitals  Temp: 99.5 °F (37.5 °C)  Temp Source: Infrared  Resp: 16  Height: 5' 5\" (165.1 cm)  Weight: 121 lb (54.9 kg)  Mr. Abdirashid Bell appears well, he is in no apparent distress, he demonstrates appropriate mood & affect. Skin: Normal in appearance, Normal Color and Free of Lesions Bilaterally   Prior incisions are fully healed, non tender and without hypersensitivity. Digits: range of motion is Full and equal bilaterally otherwise normal bilaterally. Thumb shows range of motion to be Full bilaterally  Wrist range of motion is still somewhat stiff in flexion & extension on the Right, normal on the Left  Exam of the Right wrist(s) for stability demonstrates a negative Scaphoid Shift Test, no tenderness & Instability with Luno-Triquetral Shear & Shuck testing. There is no mid-carpal \"catch-up\" clunk. The Distal Radial Ulnar Joint shows no dorso-volar instability and crepitance. The Piso-Triquetral joint is not tender to compression and shear.     There is no tenderness to palpation about the volar tubercle of the scaphoid or at the Anatomic Snuff-Box   Sensation is normal bilaterally  Vascular examination reveals normal and good capillary refill bilaterally  Swelling is minimal Radial wrist on the Right, normal on the Left  There is no tenderness to palpation of the right wrist, primarialy at the Radio-carpal Joint   Muscular strength is clinically appropriate bilaterally. Impression:  Mr. Pili Whitfield is doing well after recent right scaphoid nonunion repair. It would appear that he has healed his injury. Plan:  Mr. Pili Whitfield is instructed in the appropriate intermittent protection of his wrist injury. We discussed the use of either a removable protective orthosis or activity adjustments as the situation demands. He is demonstrated the application and use of both devices. He is also instructed in continued work on Active & Passive range of motion of the digits, wrist, & elbow. These modalities were demonstrated to him today. We discussed the return to use of the injured hand and the  resumption of activities as tolerated. I have explained the time course and likely expectations for maximal recovery of motion and function. With regard to Work Status, Mr. Pili Whitfield is allowed to return to work without restriction at his specific request.  Effective: 7/18/20. I have asked Mr. Pili Whitfield  to feel free to contact me or schedule a follow-up appointment at any time that he feels the need for any further evaluation or treatment for his upper extremitiy condition. If he feels that he continues to be feeling and functioning well, he may choose not to seek any further follow-up or treatment at his discretion. I will remain available to continue his care at any time in the future.

## 2020-07-18 NOTE — PATIENT INSTRUCTIONS
Hand Range of Motion Instructions      Dr. Mar yAnn Hidalgo    1. Be cautions in resuming fulll activities and use of the hand for next 2 - 4 weeks. 2. Perform the following exercises VIGOROUSLY at least four times a day. Exercises should be performed in the seated position with elbow on tabletop or other firm surface. If you cannot make these motions on your own, you may use other hand to assist in making these motions. A. Fully straighten fingers until hand is flat. Fully bend fingers until hand is in a full fist.   B. Bend wrist forward and backward (grasp hand around knuckles with other hand to do so). C. Rotate forearm so that your palm faces towards your face. Rotate forearm so that your palm faces away from your face (grasp hand around wrist with other hand to do so). D. Fully straighten elbow. Fully bend elbow. 3. Continue light use of the hand progressing to more normal us as it feels comfortable to do so. 4. In 2 - 4 weeks you may discontinue using the brace (if you were using one) and resume normal use of the hand and wrist if you have regained full and painless motion and function. 5. If you are unable to achieve  full and painless motion and function over 4 weeks, please call the office at [phone] to schedule a follow-up appointment with Dr. Jacqueline Villela. Thank you for choosing [facility]) Physicians for your Hand and Upper Extremity needs. If we can be of any further assistance to you, please do not hesitate to contact us.     Office Phone Number:  [phone]  or  [phone]

## 2020-07-20 ENCOUNTER — HOSPITAL ENCOUNTER (OUTPATIENT)
Dept: OCCUPATIONAL THERAPY | Age: 20
Setting detail: THERAPIES SERIES
Discharge: HOME | End: 2020-07-20
Payer: COMMERCIAL

## 2020-07-20 PROCEDURE — 97110 THERAPEUTIC EXERCISES: CPT

## 2020-07-20 PROCEDURE — 97140 MANUAL THERAPY 1/> REGIONS: CPT

## 2020-07-20 NOTE — FLOWSHEET NOTE
[facility] - Outpatient Occupational Therapy      Hand Therapy Daily Treatment Note  Date:  2020    Patient: Diego Vera   : 2000   MRN: [id number]  Referring Physician:  Dr. 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 Pt agreeable and eager for therapy. Pt also reports increases pressure in R elbow during repeated flexion/extension exercises, but disappears after ceasing exercise. Functional Disability Index: Quick DASH: raw score = 31; dysfunction = 45%    OBJECTIVE:  Noted hyper extension at elbow and scapular winging with hyper mobility in shoulder.         PROM AROM     L R L R   Pronation        WFL   Supination        WFL   Wrist Flexion        55   Wrist Extension        5   Radial Deviation        5   Ulnar Deviation       25   CMC Abduction       WFL   Composite Flexion        WFL       Hand Assessment Left  Right  Comments    9 Hole Peg Test (s) 22 24      Strength (lbs) 65, 78, 90  : 92 20, 15, 23  : 39 Pt instructed not to provoke pain   Lateral Pinch Strength (lbs)         Palmar Pinch Strength (lbs)         Tip Pinch Strength (lbs) 10 2 Pt reports pain at base of thumb   Opposition (Y/N)   Y 0 cm tip of thumb to lateral 5th digit     Edema: R wrist circumference- 16 cm, L wrist- 15.5 cm (6/19/20: R wrist- 15.5 cm)      RESTRICTIONS/PRECAUTIONS: ORIF    Exercises/Interventions: No swelling noted in R hand, though palpation of forearm reveals hardened scar tissue still present in R dorsal aspect of forearm. Treatment focused on increasing ROM, and functional use of right hand with decreased pain. Patient completed 4 minutes on arm bike for warm up, 2 minutes forward pedal and 2 minutes backwards with level 4 resistance. Pt continued warm up with green flexbar, completing stretches to work on forearm supination/pronation against resistance. Hawk tools used for soft tissue mobilization of R dorsal aspect of forearm. Pt then completed short arc exercises for wrist radial/ulnar deviation with blue mallet x 10 reps in each direction to improve eccentric control of wrist and forearm. Pt transferred 30 pound laundry basket 10 steps from one horizontal surface to another x 2 reps, pt reported sharp pain in R wrist during transfer. Scapular exercises were then done with patient palming physioball against wall and rolling ball across vertical surface to improve scapular strength. Patient then worked on increasing strength and ROM in shoulder using green theraband performing horizontal rows x 10 reps to increase rotator cuff muscles. Pt completed 10 modified push ups on elevated surface for weight bearing, elbow flexion and wrist extension. Pt completed 10 platform walks on knees with green theraband wrapped around forearms for added resistance to improve elbow flexion, wrist extension and shoulder strength and stability. Patient completed 3 ladder raises and 10 modified pull ups to work on mid range control to improve eccentric control for proximal stability during reach and forearm function for lifting. Noted hyperextension at elbow, but demonstrated improved eccentric control with verbal cues.  Conversation with OT regarding pressure in elbow, pt instructed to monitor hyperextension at elbow during flexion/extension exercises, and to always keep elbow slightly bent during exercise to decrease pressure in elbow. Reinforced importance of eccentric control during ue activity. Therapeutic Exercises  Resistance / level Sets/sec Reps Notes   Short arc exercises for wrist radial  deviation  small mallet 1 10           1 5 Make ball in R hand, roll snake, form loop, and stretch into abduction   35# 1 10 Pt maintained wrist in neutral with no reports of pain      10       Wrist stabilization during shoulder flexion on cable cross 35# 1 10        25# 1 10     25# 1 10                  Manual Intervention                            Hawk  to radial aspect of forearm over soft tissue                         Patient education:  goals, plan of care, HEP      Home Exercise Program: Written and verbal HEP instructions provided and reviewed:  · Passive ROM of wrist flexion, extension, and radial/ulnar deviation  · Wearing of compression sleeve at night (or day if preferred) as tolerated for edema control glove with sleeve  · AROM of wrist flexion, extension, and radial/ulnar deviation with can or bottle for resistance  · Pt verbalized understanding  · Glove at night  With compression sleeve for edema  · Paper crumbles three times a day times 3 reps  · Self feed using right hand.    · Dart throwing exercises  · Quadriped yoga poses for weight bearing and wrist extension     Therapeutic Exercise and NMR:  [x] (65987) Provided verbal/tactile cueing for activities related to strengthening, flexibility, endurance, ROM  for improvements in scapular, scapulothoracic and UE control with self care, reaching, carrying, lifting, house/yardwork, driving/computer work.    [] (66305) Provided verbal/tactile cueing for activities related to improving balance, coordination, kinesthetic sense, posture, motor skill, proprioception  to assist with  scapular, scapulothoracic and UE control with self care, reaching, carrying, lifting, house/yardwork, driving/computer work.   [] Comments:    Therapeutic Activities:    [] (46949 or 95093) Provided verbal/tactile cueing for activities related to improving balance, coordination, kinesthetic sense, posture, motor skill, proprioception and motor activation to allow for proper function of scapular, scapulothoracic and UE control with self care, carrying, lifting, driving/computer work  [] Comments:    Home Exercise Program:    [x] (12010) Reviewed/Progressed HEP activities related to strengthening, flexibility, endurance, ROM of scapular, scapulothoracic and UE control with self care, reaching, carrying, lifting, house/yardwork, driving/computer work  [] (54803) Reviewed/Progressed HEP activities related to improving balance, coordination, kinesthetic sense, posture, motor skill, proprioception of scapular, scapulothoracic and UE control with self care, reaching, carrying, lifting, house/yardwork, driving/computer work    [] Comments:    Manual Treatments:  PROM / STM / Oscillations-Mobs:  G-I, II, III, IV (PA's, Inf., Post.)  [x] (11268) Provided manual therapy to mobilize soft tissue/joints of cervical/CT, scapular GHJ and UE for the purpose of modulating pain, promoting relaxation,  increasing ROM, reducing/eliminating soft tissue swelling/inflammation/restriction, improving soft tissue extensibility and allowing for proper ROM for normal function with self care, reaching, carrying, lifting, house/yardwork, driving/computer work  [] Comments:    ADL Training:  [] (20177) Provided self-care/home management training related to activities of daily living and compensatory training, and/or use of adaptive equipment   [] Comments:     Splinting:  [] Fabrication of:   [] (64198) Orthotic/Prosthetic Management, subsequent encounter  [] (21978) Orthotic management and training (fitting and assessment)  [] Comments:    Modalities: paraffin     Charges:  Timed Code Treatment Minutes: 60 Total Treatment Minutes: 60     [] EVAL (LOW) 34725   [] OT Re-eval (89740)  [] EVAL (MOD) 78687   [] EVAL (HIGH) 50698       [x] Flower (08857) x    3 [] BPEFA(64506)  [] NMR (77420) x     [] Estim (attended) (34757)   [x] Manual (11940) x    1 [] US (45498)  [] TA (87881) x       [] Paraffin (03882)  [] ADL  (16613) x    [] Splint/L code:    [] Estim (unattended) (29231)  [] Fluidotherapy (03321)  [] Other:    GOALS:   Patient stated goal: more motion and strength, less pain  [x]? Progressing: []? Met: []? Not Met: []? Adjusted     Therapist goals for Patient:   Short Term Goals: To be achieved in: 30 days  1. Pt will increase L hand  strength to 50 lbs by 7/5/20. []? Progressing: [x]? Met: []? Not Met: []? Adjusted  2. Pt will increase wrist flexion by 15 degrees by 7/5/20. []? Progressing: [x]? Met: []? Not Met: []? Adjusted  3. Pt will increase wrist extension by 10 degrees by 7/5/20. [x]? Progressing: []? Met: []? Not Met: []? Adjusted     Long Term Goals: To be achieved by dishcharge  1. Pt will will report a QuickDASH Symptom Severity Scale score of 20% or less indicating increased safety and functional independence in desired occupational pursuits by discharge. [x]? Progressing: []? Met: []? Not Met: []? Adjusted    Progression Towards Functional goals:  [x] Patient is progressing as expected towards functional goals listed. [] Progression is slowed due to complexities listed. [] Progression has been slowed due to co-morbidities.   [] Plan just implemented, too soon to assess goals progression  [] All goals are met  [] Other:     ASSESSMENT:  See eval    Treatment/Activity Tolerance:  [x] Patient tolerated treatment well [] Patient limited by fatique  [] Patient limited by pain  [] Patient limited by other medical complications  [] Other:     Prognosis: [x] Good [] Fair  [] Poor    Patient Requires Follow-up: [x] Yes  [] No    PLAN: See eval  [x] Continue per plan of care [] Alter current plan

## 2020-07-22 ENCOUNTER — HOSPITAL ENCOUNTER (OUTPATIENT)
Dept: OCCUPATIONAL THERAPY | Age: 20
Setting detail: THERAPIES SERIES
Discharge: HOME | End: 2020-07-22
Payer: COMMERCIAL

## 2020-07-22 PROCEDURE — 97140 MANUAL THERAPY 1/> REGIONS: CPT

## 2020-07-22 PROCEDURE — 97530 THERAPEUTIC ACTIVITIES: CPT

## 2020-07-22 NOTE — DISCHARGE SUMMARY
[facility] - Outpatient Occupational Therapy      Hand Therapy Daily Treatment Note/Discharge Summary    Date:  2020    Patient: Gabriela Robles   : 2000   MRN: [id number]  Referring Physician:  Dr. 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)                                                   Insurance information:   [facility] -  combined  Date of Injury: ~5 years prior  Date of Surgery: 3/12/20      Visit # Insurance Allowable Date Range         Date of Patient follow up with Physician: n/a    Progress Note: []  Yes  [x]  No  Next due by: Visit #10      Latex Allergy:  [x]No      []Yes  Pacemaker:  [x] No       [] Yes     Preferred Language for Healthcare:   [x]English       []other:    Pain level:  2/10 Pt reports his pain is only at a 2/10 due to sleeping on it \"funny,\" but he consistently has a pain level of 0-1/10. SUBJECTIVE: Pt reports his follow-up with his physician went well and though he may not be at 100% for ~6 more months, he is ready to begin work without restrictions.      Functional Disability Index: Quick DASH: raw score = 31; dysfunction = 45%   7/15/20: raw score= 15, dysfunction= 9.09%     OBJECTIVE:   Noted hyper externsion at elbow and scapular winging with hyper mobility in shoulder.             AROM 6/5/20 7/15/20     L R R   Pronation    WFL WFL   Supination    WFL WFL   Wrist Flexion    55 60 (20: 71)   Wrist Extension    5 75   Radial Deviation    5 11   Ulnar Deviation   25 31   CMC Abduction   [facility] WFL   Composite Flexion    [facility]         Hand Assessment Left  Right  Comments    9 Hole Peg Test (s) : 22 : 24      Strength (lbs) : 65, 78, 90  : 92  7/15: 95, 97, 97 : 20, 15, 23  6: 39  7/15: 72, 75, 75 Pt instructed not to provoke pain   Lateral Pinch Strength (lbs)         Palmar Pinch Strength (lbs)       Tip Pinch Strength (lbs) 6/5: 10  7/15: 10 6/5: 2  7/15: 9.5 Pt reports pain at base of thumb   Opposition (Y/N)   Y 0 cm tip of thumb to lateral 5th digit      Edema: R wrist circumference- 16 cm, L wrist- 15.5 cm   6/19/20: R wrist- 15.5 cm  7/15/20: R wrist- 15.5 cm      RESTRICTIONS/PRECAUTIONS: ORIF    Exercises/Interventions: Session initiated with discussion regarding discharge with pt reporting he is at 80% recovery and feels confident to discharge and continue strengthening and ROM independently after education on HEP to reach 100% recovery. Pt received scar massage and STM of dorsal forearm with decreased tension noted in soft tissue; pt educated on self-massage for scar and dorsal forearm with pt demonstrating proper technique and verbalizing understanding. Pt educated on kinesio taping for wrist stability and support during strenuous physical activity with pt demonstrating proper technique and verbalizing understanding. All pt questions answered regarding activity modifications and safe participation with no restrictions noted at this time and pt able to participate as tolerated. All pt goals met with pt reporting satisfaction with OT services. Pt educated on reporting any new or returning concerns to physician. Pt discharged. Therapeutic Exercises  Resistance / level Sets/sec Reps Notes                     Patient education:  goals, plan of care, HEP      Home Exercise Program: Written and verbal HEP instructions provided and reviewed:  · Passive ROM of wrist flexion, extension, and radial/ulnar deviation  · Wearing of compression sleeve at night (or day if preferred) as tolerated for edema control glove with sleeve  · AROM of wrist flexion, extension, and radial/ulnar deviation with can or bottle for resistance  · Pt verbalized understanding  · Glove at night  With compression sleeve for edema  · Paper crumbles three times a day times 3 reps  · Self feed using right hand.    · Dart throwing exercises  · Quadriped yoga poses for weight bearing and wrist extension     Therapeutic Exercise and NMR:  [] (28115) Provided verbal/tactile cueing for activities related to strengthening, flexibility, endurance, ROM  for improvements in scapular, scapulothoracic and UE control with self care, reaching, carrying, lifting, house/yardwork, driving/computer work.    [] (05692) Provided verbal/tactile cueing for activities related to improving balance, coordination, kinesthetic sense, posture, motor skill, proprioception  to assist with  scapular, scapulothoracic and UE control with self care, reaching, carrying, lifting, house/yardwork, driving/computer work.   [] Comments:    Therapeutic Activities:    [x] (67010 or 89779) Provided verbal/tactile cueing for activities related to improving balance, coordination, kinesthetic sense, posture, motor skill, proprioception and motor activation to allow for proper function of scapular, scapulothoracic and UE control with self care, carrying, lifting, driving/computer work  [] Comments:    Home Exercise Program:    [x] (54283) Reviewed/Progressed HEP activities related to strengthening, flexibility, endurance, ROM of scapular, scapulothoracic and UE control with self care, reaching, carrying, lifting, house/yardwork, driving/computer work  [] (93741) Reviewed/Progressed HEP activities related to improving balance, coordination, kinesthetic sense, posture, motor skill, proprioception of scapular, scapulothoracic and UE control with self care, reaching, carrying, lifting, house/yardwork, driving/computer work    [] Comments:    Manual Treatments:  PROM / STM / Oscillations-Mobs:  G-I, II, III, IV (PA's, Inf., Post.)  [x] (85802) Provided manual therapy to mobilize soft tissue/joints of cervical/CT, scapular GHJ and UE for the purpose of modulating pain, promoting relaxation,  increasing ROM, reducing/eliminating soft tissue swelling/inflammation/restriction, improving soft tissue extensibility and allowing for proper ROM for normal function with self care, reaching, carrying, lifting, house/yardwork, driving/computer work  [] Comments:    ADL Training:  [] (90719) Provided self-care/home management training related to activities of daily living and compensatory training, and/or use of adaptive equipment   [] Comments:     Splinting:  [] Fabrication of:   [] (89992) Orthotic/Prosthetic Management, subsequent encounter  [] (64479) Orthotic management and training (fitting and assessment)  [] Comments:    Modalities:     Charges:  Timed Code Treatment Minutes: 45   Total Treatment Minutes: 45     [] EVAL (LOW) 13149   [] OT Re-eval (64515)  [] EVAL (MOD) 71339   [] EVAL (HIGH) [phone]       [] Flower (74993) x     [] KOMYX(12666)  [] NMR (23767) x     [] Estim (attended) (46173)   [x] Manual ([address] [facility]) x    2 [] US (73461)  [x] TA (26670) x    1   [] Paraffin (82567)  [] ADL  ([phone]) x    [] Splint/L code:    [] Estim (unattended) ([id number])  [] Fluidotherapy (77918)  [] Other:    GOALS:   Patient stated goal: more motion and strength, less pain  []? Progressing: [x]? Met: []? Not Met: []? Adjusted     Therapist goals for Patient:   Short Term Goals: To be achieved in: 30 days  1. Pt will increase L hand  strength to 50 lbs by 7/5/20. []? Progressing: [x]? Met: []? Not Met: []? Adjusted  2. Pt will increase wrist flexion by 15 degrees by 7/5/20. []? Progressing: [x]? Met: []? Not Met: []? Adjusted  3. Pt will increase wrist extension by 10 degrees by 7/5/20. []? Progressing: [x]? Met: []? Not Met: []? Adjusted     Long Term Goals: To be achieved by dishcharge  1. Pt will will report a QuickDASH Symptom Severity Scale score of 20% or less indicating increased safety and functional independence in desired occupational pursuits by discharge. []? Progressing: [x]? Met: []? Not Met: []?  Adjusted    Progression Towards Functional goals:  [] Patient is progressing as expected towards functional goals listed. [] Progression is slowed due to complexities listed. [] Progression has been slowed due to co-morbidities. [] Plan just implemented, too soon to assess goals progression  [x] All goals are met  [] Other:     ASSESSMENT:  See eval    Treatment/Activity Tolerance:  [x] Patient tolerated treatment well [] Patient limited by fatique  [] Patient limited by pain  [] Patient limited by other medical complications  [] Other:     Prognosis: [x] Good [] Fair  [] Poor    Patient Requires Follow-up: [] Yes  [x] No    PLAN: See eval  [] Continue per plan of care [] Alter current plan (see comments)  [] Plan of care initiated [] Hold pending MD visit [x] Discharge    Electronically signed by:          Farooq Milligan, OTR/L [id number]      Note: If patient does not return for scheduled/ recommended follow up visits, this note will serve as a discharge from care along with most recent update on progress.

## 2020-10-08 ENCOUNTER — OFFICE VISIT (OUTPATIENT)
Dept: PRIMARY CARE CLINIC | Age: 20
End: 2020-10-08
Payer: COMMERCIAL

## 2020-10-08 PROCEDURE — G8420 CALC BMI NORM PARAMETERS: HCPCS | Performed by: NURSE PRACTITIONER

## 2020-10-08 PROCEDURE — 99211 OFF/OP EST MAY X REQ PHY/QHP: CPT | Performed by: NURSE PRACTITIONER

## 2020-10-08 PROCEDURE — G8428 CUR MEDS NOT DOCUMENT: HCPCS | Performed by: NURSE PRACTITIONER

## 2020-10-08 NOTE — PROGRESS NOTES
Prudence Simmons received a viral test for COVID-19. They were educated on isolation and quarantine as appropriate. For any symptoms, they were directed to seek care from their PCP, given contact information to establish with a doctor, directed to an urgent care or the emergency room.

## 2020-10-10 LAB — SARS-COV-2, NAA: NOT DETECTED

## 2020-10-13 NOTE — RESULT ENCOUNTER NOTE
Your Covid-10 teste resulted not detected/negative. What happens if I have a negative test?    Remember to wash your hands often, avoid touching your face, stay 6 feet from people you do not live with, and wear a cloth facemask when you go out in public. A negative COVID-19 test at one point in time does not mean you will stay negative. You could become ill with COVID-19 and/or test positive at any time. If you are a close contact of a confirmed or suspected case, continue to stay home and away from others until 14 days after your last exposure. If you do not have symptoms, and were not in close contact with a confirmed or suspected case, you can stop isolating. If you currently have symptoms of COVID-19, and were not in close contact with a confirmed or suspected case, you should keep monitoring symptoms and talk to your doctor or other healthcare provider about staying home and if you need to get tested again. If you develop symptoms of COVID-19, stay at home and away from others and talk to your doctor or other healthcare provider about getting tested again. For additional information, visit coronavirus. [place].gov. For answers to your COVID-19 questions, call [phone]-ASK-[facility] ([phone]).

## 2021-01-30 ENCOUNTER — HOSPITAL ENCOUNTER (EMERGENCY)
Age: 21
Discharge: HOME | End: 2021-01-30
Attending: EMERGENCY MEDICINE
Payer: COMMERCIAL

## 2021-01-30 VITALS
BODY MASS INDEX: 21.52 KG/M2 | SYSTOLIC BLOOD PRESSURE: 138 MMHG | WEIGHT: 129.19 LBS | DIASTOLIC BLOOD PRESSURE: 84 MMHG | HEIGHT: 65 IN | TEMPERATURE: 98.3 F | OXYGEN SATURATION: 99 % | HEART RATE: 81 BPM | RESPIRATION RATE: 14 BRPM

## 2021-01-30 DIAGNOSIS — R21 RASH AND OTHER NONSPECIFIC SKIN ERUPTION: Primary | ICD-10-CM

## 2021-01-30 PROCEDURE — 96365 THER/PROPH/DIAG IV INF INIT: CPT

## 2021-01-30 PROCEDURE — 99283 EMERGENCY DEPT VISIT LOW MDM: CPT

## 2021-01-30 PROCEDURE — 2580000003 HC RX 258: Performed by: EMERGENCY MEDICINE

## 2021-01-30 PROCEDURE — 6370000000 HC RX 637 (ALT 250 FOR IP): Performed by: EMERGENCY MEDICINE

## 2021-01-30 PROCEDURE — 6360000002 HC RX W HCPCS: Performed by: EMERGENCY MEDICINE

## 2021-01-30 RX ORDER — CLINDAMYCIN HYDROCHLORIDE 300 MG/1
300 CAPSULE ORAL 4 TIMES DAILY
Qty: 40 CAPSULE | Refills: 0 | Status: SHIPPED | OUTPATIENT
Start: 2021-01-30 | End: 2021-02-09

## 2021-01-30 RX ORDER — PREDNISONE 20 MG/1
60 TABLET ORAL ONCE
Status: COMPLETED | OUTPATIENT
Start: 2021-01-30 | End: 2021-01-30

## 2021-01-30 RX ORDER — PREDNISONE 50 MG/1
50 TABLET ORAL DAILY
Qty: 5 TABLET | Refills: 0 | Status: SHIPPED | OUTPATIENT
Start: 2021-01-30 | End: 2021-02-04

## 2021-01-30 RX ADMIN — PREDNISONE 60 MG: 20 TABLET ORAL at 22:48

## 2021-01-30 RX ADMIN — CEFTRIAXONE 1000 MG: 1 INJECTION, POWDER, FOR SOLUTION INTRAMUSCULAR; INTRAVENOUS at 22:49

## 2021-01-31 ASSESSMENT — ENCOUNTER SYMPTOMS
STRIDOR: 0
EYE DISCHARGE: 0
PHOTOPHOBIA: 0
EYE PAIN: 0
RECTAL PAIN: 0
BLOOD IN STOOL: 0
VOMITING: 0
CHOKING: 0
ANAL BLEEDING: 0
APNEA: 0
BACK PAIN: 0
DIARRHEA: 0
WHEEZING: 0
CHEST TIGHTNESS: 0
EYE ITCHING: 0
ABDOMINAL PAIN: 0
CONSTIPATION: 0
COUGH: 0
NAUSEA: 0
ABDOMINAL DISTENTION: 0
EYE REDNESS: 0
SHORTNESS OF BREATH: 0

## 2021-01-31 NOTE — ED PROVIDER NOTES
[facility]      Pt Name: Elisabeht Mays  MRN: [id number]  [place] 2000  Date of evaluation: 1/30/2021  Provider: Dom Sutherland MD    CHIEF COMPLAINT       Chief Complaint   Patient presents with    Rash     pt states he had his 2nd dose of COVID vaccine 3 days ago now has rash on left arm. HISTORY OF PRESENT ILLNESS    Elisabeth Mays is a 21 y.o. male who presents to the emergency department with rash. Patient states he had a second dose of the Covid vaccine 3 days ago. States the redness and swelling has ensued from where he got the shot. States another person who also got vaccine ended up getting cellulitis. Minimal pain. No other associated symptoms. Denies fevers. Nursing Notes were reviewed. Including nursing noted for FM, Surgical History, Past Medical History, Social History, vitals, and allergies; agree with all. REVIEW OF SYSTEMS       Review of Systems   Constitutional: Negative for activity change, appetite change, chills, diaphoresis, fatigue, fever and unexpected weight change. HENT: Negative for congestion, dental problem, drooling, ear discharge and ear pain. Eyes: Negative for photophobia, pain, discharge, redness, itching and visual disturbance. Respiratory: Negative for apnea, cough, choking, chest tightness, shortness of breath, wheezing and stridor. Cardiovascular: Negative for chest pain, palpitations and leg swelling. Gastrointestinal: Negative for abdominal distention, abdominal pain, anal bleeding, blood in stool, constipation, diarrhea, nausea, rectal pain and vomiting. Endocrine: Negative for cold intolerance and heat intolerance. Genitourinary: Negative for decreased urine volume and urgency. Musculoskeletal: Negative for arthralgias and back pain. Skin: Positive for rash. Negative for pallor. Neurological: Negative for dizziness and facial asymmetry.    Hematological: Negative for adenopathy. Does not bruise/bleed easily. Psychiatric/Behavioral: Negative for agitation, behavioral problems, confusion and decreased concentration. Except as noted above the remainder of the review of systems was reviewed and negative. PAST MEDICAL HISTORY     Past Medical History:   Diagnosis Date    ADHD     no meds    Anesthesia complication     high tolerance to numbing medication    Asthma     exercised induced    IBS (irritable bowel syndrome)        SURGICAL HISTORY       Past Surgical History:   Procedure Laterality Date    ADENOIDECTOMY      DENTAL SURGERY      lower left molar removed    TONSILLECTOMY      TONSILLECTOMY AND ADENOIDECTOMY      TYMPANOSTOMY TUBE PLACEMENT      WRIST FRACTURE SURGERY Right 3/12/2020    RIGHT SCAPHOID NON-UNION REPAIR WITH DISTAL RADIUS BONE GRAFT performed by Lianne Durham MD at [address]       Discharge Medication List as of 1/30/2021 11:28 PM      CONTINUE these medications which have NOT CHANGED    Details   Probiotic Product (PROBIOTIC DAILY PO) Take by mouthHistorical Med             ALLERGIES     Patient has no known allergies.     FAMILY HISTORY        Family History   Problem Relation Age of Onset    No Known Problems Mother     No Known Problems Father         physical disability from MVA    No Known Problems Sister     No Known Problems Sister     No Known Problems Sister     Heart Attack Maternal Grandfather     Heart Disease Maternal Grandfather     No Known Problems Paternal Grandmother     Heart Attack Paternal Grandfather        SOCIAL HISTORY       Social History     Socioeconomic History    Marital status: Single     Spouse name: None    Number of children: 0    Years of education: None    Highest education level: None   Occupational History    Occupation: [place] at [employer] resource strain: None    Food insecurity     Worry: None     Inability: None  Transportation needs     Medical: None     Non-medical: None   Tobacco Use    Smoking status: Never Smoker    Smokeless tobacco: Never Used   Substance and Sexual Activity    Alcohol use: No    Drug use: Not Currently     Types: Marijuana     Comment: last used-aprox 3 years ago    Sexual activity: Yes   Lifestyle    Physical activity     Days per week: None     Minutes per session: None    Stress: None   Relationships    Social connections     Talks on phone: None     Gets together: None     Attends [employer] service: None     Active member of club or organization: None     Attends meetings of clubs or organizations: None     Relationship status: None    Intimate partner violence     Fear of current or ex partner: None     Emotionally abused: None     Physically abused: None     Forced sexual activity: None   Other Topics Concern    None   Social History Narrative    ** Merged History Encounter **            PHYSICAL EXAM       ED Triage Vitals [01/30/21 2113]   BP Temp Temp Source Pulse Resp SpO2 Height Weight   138/84 98.3 °F (36.8 °C) Oral 81 14 99 % 5' 5\" (1.651 m) 129 lb 3 oz (58.6 kg)       Physical Exam  Vitals signs and nursing note reviewed. Constitutional:       General: He is not in acute distress. Appearance: He is well-developed. He is not diaphoretic. HENT:      Head: Normocephalic and atraumatic. Eyes:      General:         Right eye: No discharge. Left eye: No discharge. Pupils: Pupils are equal, round, and reactive to light. Neck:      Musculoskeletal: Normal range of motion. Thyroid: No thyromegaly. Trachea: No tracheal deviation. Cardiovascular:      Rate and Rhythm: Normal rate and regular rhythm. Heart sounds: No murmur. Pulmonary:      Breath sounds: No wheezing or rales. Chest:      Chest wall: No tenderness. Abdominal:      General: There is no distension. Palpations: Abdomen is soft. There is no mass. Tenderness:  There of hospitalization to be greater than risk of treatment at home. I have explained to the patient that the risk could rapidly change, given precautions for return and instructions. Explained to patient that the risk for mortality is low based on demographic, history and clinical factors. I discussed with patient the results of evaluation in the ED, diagnosis, care, and prognosis. The plan is to discharge to home. Patient is in agreement with plan and questions have been answered. I also discussed with patient the reasons which may require a return visit and the importance of follow-up care. The patient is well-appearing, nontoxic, and improved at the time of discharge. Patient agrees to call to arrange follow-up care as directed. Patient understands to return immediately for worsening/change in symptoms. CRITICAL CARE TIME   Total Critical Caretime was 21 minutes, excluding separately reportable procedures. There was a high probability of clinically significant/life threatening deterioration in the patient's condition which required my urgent intervention. PROCEDURES:  Unlessotherwise noted below, none    FINAL IMPRESSION      1.  Rash and other nonspecific skin eruption          DISPOSITION/PLAN   DISPOSITION Decision To Discharge 01/30/2021 11:49:32 PM    PATIENT REFERRED TO:  Kerry Patel MD  [address] [facility] [address] [facility]  [phone]            DISCHARGE MEDICATIONS:  Discharge Medication List as of 1/30/2021 11:28 PM      START taking these medications    Details   predniSONE (DELTASONE) 50 MG tablet Take 1 tablet by mouth daily for 5 days, Disp-5 tablet, R-0Print      clindamycin (CLEOCIN) 300 MG capsule Take 1 capsule by mouth 4 times daily for 10 days, Disp-40 capsule, R-0Print                (Please note that portions ofthis note were completed with a voice recognition program.  Efforts were made to edit the dictations but occasionally words are mis-transcribed.)    Az Sanches MD(electronically signed)  Attending Emergency Physician        Az Sanches MD  01/31/21 0571

## 2021-08-05 ENCOUNTER — HOSPITAL ENCOUNTER (EMERGENCY)
Age: 21
Discharge: HOME | End: 2021-08-06
Attending: EMERGENCY MEDICINE
Payer: COMMERCIAL

## 2021-08-05 ENCOUNTER — APPOINTMENT (OUTPATIENT)
Dept: GENERAL RADIOLOGY | Age: 21
End: 2021-08-05
Payer: COMMERCIAL

## 2021-08-05 DIAGNOSIS — S99.922A INJURY OF TOE ON LEFT FOOT, INITIAL ENCOUNTER: Primary | ICD-10-CM

## 2021-08-05 PROCEDURE — 73660 X-RAY EXAM OF TOE(S): CPT

## 2021-08-05 PROCEDURE — 99284 EMERGENCY DEPT VISIT MOD MDM: CPT

## 2021-08-05 RX ORDER — NAPROXEN 500 MG/1
500 TABLET ORAL 2 TIMES DAILY WITH MEALS
Qty: 60 TABLET | Refills: 5 | Status: SHIPPED | OUTPATIENT
Start: 2021-08-05 | End: 2021-10-27

## 2021-08-05 RX ORDER — IBUPROFEN 400 MG/1
400 TABLET ORAL ONCE
Status: COMPLETED | OUTPATIENT
Start: 2021-08-06 | End: 2021-08-06

## 2021-08-05 ASSESSMENT — PAIN DESCRIPTION - DESCRIPTORS: DESCRIPTORS: ACHING;CONSTANT

## 2021-08-05 ASSESSMENT — PAIN SCALES - GENERAL: PAINLEVEL_OUTOF10: 4

## 2021-08-05 ASSESSMENT — PAIN DESCRIPTION - LOCATION: LOCATION: TOE (COMMENT WHICH ONE)

## 2021-08-06 VITALS
DIASTOLIC BLOOD PRESSURE: 79 MMHG | WEIGHT: 122.36 LBS | SYSTOLIC BLOOD PRESSURE: 122 MMHG | TEMPERATURE: 98.4 F | HEART RATE: 62 BPM | RESPIRATION RATE: 16 BRPM | OXYGEN SATURATION: 97 % | HEIGHT: 65 IN | BODY MASS INDEX: 20.39 KG/M2

## 2021-08-06 PROCEDURE — 6370000000 HC RX 637 (ALT 250 FOR IP): Performed by: EMERGENCY MEDICINE

## 2021-08-06 RX ADMIN — IBUPROFEN 400 MG: 400 TABLET ORAL at 00:15

## 2021-08-06 ASSESSMENT — PAIN DESCRIPTION - LOCATION: LOCATION: TOE (COMMENT WHICH ONE)

## 2021-08-06 ASSESSMENT — PAIN SCALES - GENERAL
PAINLEVEL_OUTOF10: 4
PAINLEVEL_OUTOF10: 4

## 2021-08-06 ASSESSMENT — PAIN DESCRIPTION - DESCRIPTORS: DESCRIPTORS: CONSTANT;ACHING

## 2021-08-06 NOTE — ED NOTES
Discharge and education instructions reviewed. Patient verbalized understanding, teach-back successful. Patient denied questions at this time. No acute distress noted. Patient instructed to follow-up as noted - return to emergency department if symptoms worsen. Patient verbalized understanding. Discharged per EDMD with discharged instructions.        Brian Meier RN  08/06/21 0189

## 2021-08-06 NOTE — ED PROVIDER NOTES
[address] [facility]  EMERGENCY DEPARTMENTENCOUNTER      Pt Name: Elroy Mosley  MRN: [id number]  [place] 2000  Date ofevaluation: 8/5/2021  Provider: Anitra Bliss MD    CHIEF COMPLAINT       Chief Complaint   Patient presents with    Toe Injury     Pt states he stubbed his toe on a toolbox around 6pm.  Pt states it has swollen up and he can bend or put pressure on it. HISTORY OF PRESENT ILLNESS   (Location/Symptom, Timing/Onset,Context/Setting, Quality, Duration, Modifying Factors, Severity)  Note limiting factors. Elroy Mosley is a 21 y.o. male  who  has a past medical history of ADHD, Anesthesia complication, Asthma, and IBS (irritable bowel syndrome). who presents to the emergency department for evaluation of injury to the fifth digit on the left foot. Patient reports around 1800 he excellently stubbed his toe. He reports bruising and pain with flexion of the toe. Denies ankle pain or midfoot pain. States he is able to ambulate denies a history of previous injury. . Did not take the medications for symptoms. HPI    NursingNotes were reviewed. REVIEW OF SYSTEMS    (2-9 systems for level 4, 10 or more for level 5)     Review of Systems   Musculoskeletal: Positive for arthralgias and joint swelling. Skin: Negative for wound. Neurological: Negative for weakness and numbness. Except as noted above the remainder of the review of systems was reviewed and negative.        PAST MEDICAL HISTORY     Past Medical History:   Diagnosis Date    ADHD     no meds    Anesthesia complication     high tolerance to numbing medication    Asthma     exercised induced    IBS (irritable bowel syndrome)          SURGICALHISTORY       Past Surgical History:   Procedure Laterality Date    ADENOIDECTOMY      DENTAL SURGERY      lower left molar removed    TONSILLECTOMY      TONSILLECTOMY AND ADENOIDECTOMY      TYMPANOSTOMY TUBE PLACEMENT      WRIST FRACTURE SURGERY Right 3/12/2020    RIGHT SCAPHOID NON-UNION REPAIR WITH DISTAL RADIUS BONE GRAFT performed by Gee Chairez MD at [address]       Previous Medications    PROBIOTIC PRODUCT (PROBIOTIC DAILY PO)    Take by mouth            Patient has no known allergies. FAMILY HISTORY       Family History   Problem Relation Age of Onset    No Known Problems Mother     No Known Problems Father         physical disability from MVA    No Known Problems Sister     No Known Problems Sister     No Known Problems Sister     Heart Attack Maternal Grandfather     Heart Disease Maternal Grandfather     No Known Problems Paternal Grandmother     Heart Attack Paternal Grandfather           SOCIAL HISTORY       Social History     Socioeconomic History    Marital status: Single     Spouse name: Not on file    Number of children: 0    Years of education: Not on file    Highest education level: Not on file   Occupational History    Occupation: student at [employer]   Tobacco Use    Smoking status: Never Smoker    Smokeless tobacco: Never Used   Vaping Use    Vaping Use: Never used   Substance and Sexual Activity    Alcohol use: No    Drug use: Not Currently     Types: Marijuana     Comment: last used-aprox 3 years ago    Sexual activity: Yes   Other Topics Concern    Not on file   Social History Narrative    ** Merged History Encounter **          Social Determinants of Health     Financial Resource Strain:     Difficulty of Paying Living Expenses:    Food Insecurity:     Worried About Running Out of Food in the Last Year:     Ran Out of Food in the Last Year:    Transportation Needs:     Lack of Transportation (Medical):      Lack of Transportation (Non-Medical):    Physical Activity:     Days of Exercise per Week:     Minutes of Exercise per Session:    Stress:     Feeling of Stress :    Social Connections:     Frequency of Communication with Friends and Family:     Frequency of Social Gatherings with Friends and Family:     Attends Jehovah's witness Services:     Active Member of Clubs or Organizations:     Attends Club or Organization Meetings:     Marital Status:    Intimate Partner Violence:     Fear of Current or Ex-Partner:     Emotionally Abused:     Physically Abused:     Sexually Abused:        SCREENINGS             PHYSICAL EXAM    (up to 7 for level 4, 8 or more for level 5)     ED Triage Vitals [08/05/21 2201]   BP Temp Temp Source Pulse Resp SpO2 Height Weight   118/75 98.4 °F (36.9 °C) Oral 73 -- 97 % 5' 5\" (1.651 m) 122 lb 5.7 oz (55.5 kg)       Physical Exam  Vitals and nursing note reviewed. Constitutional:       General: He is not in acute distress. Appearance: He is well-developed. HENT:      Head: Normocephalic and atraumatic. Eyes:      Extraocular Movements: Extraocular movements intact. Conjunctiva/sclera: Conjunctivae normal.      Pupils: Pupils are equal, round, and reactive to light. Neck:      Trachea: No tracheal deviation. Cardiovascular:      Rate and Rhythm: Normal rate and regular rhythm. Pulmonary:      Effort: Pulmonary effort is normal.      Breath sounds: Normal breath sounds. No wheezing or rales. Abdominal:      General: There is no distension. Palpations: Abdomen is soft. Tenderness: There is no abdominal tenderness. Musculoskeletal:         General: Swelling, tenderness and signs of injury present. No deformity. Normal range of motion. Cervical back: Normal range of motion. Skin:     General: Skin is warm and dry. Capillary Refill: Capillary refill takes less than 2 seconds. Findings: Bruising present. Neurological:      General: No focal deficit present. Mental Status: He is alert and oriented to person, place, and time.          RESULTS     EKG: All EKG's are interpreted by the Emergency Department Physician who either signs or Co-signsthis chart in the absence of a cardiologist.        RADIOLOGY:   Romulo Sharp such as CT, Ultrasound and MRI are read by the radiologist. Plain radiographic images are visualized and preliminarily interpreted by the emergency physician with the below findings:        Interpretation per the Radiologist below, if available at the time ofthis note:    XR TOE LEFT (MIN 2 VIEWS)   Final Result   No acute abnormality seen. ED BEDSIDE ULTRASOUND:   Performed by ED Physician - none    LABS:  Labs Reviewed - No data to display    All other labs were within normal range or not returned as of this dictation. EMERGENCY DEPARTMENT COURSE and DIFFERENTIAL DIAGNOSIS/MDM:   Vitals:    Vitals:    08/05/21 2201   BP: 118/75   Pulse: 73   Temp: 98.4 °F (36.9 °C)   TempSrc: Oral   SpO2: 97%   Weight: 122 lb 5.7 oz (55.5 kg)   Height: 5' 5\" (1.651 m)       Patient was given thefollowing medications:  Medications   ibuprofen (ADVIL;MOTRIN) tablet 400 mg (has no administration in time range)       ED COURSE & MEDICAL DECISION MAKING    Pertinent Labs & Imaging studies reviewed. (See chart for details)   -  Patient seen and evaluated in the emergency department. -  Triage and nursing notes reviewed and incorporated. -  Old chart records reviewed and incorporated. -  Differential diagnosis includes: Differential diagnosis: includes but not limited to Arterial Injury/Ischemia, Fracture, Dislocation, Infection, Compartment Syndrome, Neurologic Deficit/Injury. -  Work-up included:  See above  -  ED treatment included: See above  -  Results discussed with patient. Patient presents the ED for evaluation of an injury to the fifth digit on left foot. On exam patient is some bruising at the base the toe. Range of motion is limited secondary to pain. Capillary for less than 3 seconds. Patient has no tenderness or pain to the ankle or along the fourth and fifth metatarsals. He is ambulating. imaging studies show no osseous abnormalities. Patient feels improved on reevaluation. Symptomatic treatment with expectant management discussed with the patient and they and/or family members present are amenable to treatment plan and outpatient follow-up. Strict return precautions were discussed with the patient and those present. They demonstrated understanding of when to return to the emergency department for new or worsening symptoms. .  The patient is agreeable with plan of care and disposition. REASSESSMENT          CRITICAL CARE TIME   Total Critical Care time was 0 minutes, excluding separately reportable procedures. There was a high probability of clinically significant/life threatening deterioration in the patient's condition which required my urgent intervention. CONSULTS:  None    PROCEDURES:  Unless otherwise noted below, none     Procedures    FINAL IMPRESSION      1.  Injury of toe on left foot, initial encounter          DISPOSITION/PLAN   DISPOSITION Decision To Discharge 08/05/2021 11:58:08 PM      PATIENT REFERREDTO:  Rubi Kinney MD  [address] [place] [address]  [phone]    Schedule an appointment as soon as possible for a visit   As needed    Uriah Mcginnis DPM  [facility] [address]  [address] [facility]  [phone]    Schedule an appointment as soon as possible for a visit   As needed      DISCHARGEMEDICATIONS:  New Prescriptions    NAPROXEN (NAPROSYN) 500 MG TABLET    Take 1 tablet by mouth 2 times daily (with meals)          (Please note that portions of this note were completed with a voice recognition program.  Efforts were made to edit the dictations but occasionally words are mis-transcribed.)    Kishore Lynne MD (electronically signed)  Attending Emergency Physician          Kishore Lynne MD  08/06/21 5738

## 2021-10-27 ENCOUNTER — OFFICE VISIT (OUTPATIENT)
Dept: FAMILY MEDICINE CLINIC | Age: 21
End: 2021-10-27
Payer: COMMERCIAL

## 2021-10-27 VITALS
BODY MASS INDEX: 20.63 KG/M2 | SYSTOLIC BLOOD PRESSURE: 116 MMHG | RESPIRATION RATE: 12 BRPM | DIASTOLIC BLOOD PRESSURE: 70 MMHG | HEART RATE: 76 BPM | WEIGHT: 124 LBS | OXYGEN SATURATION: 98 %

## 2021-10-27 DIAGNOSIS — L23.9 ALLERGIC CONTACT DERMATITIS, UNSPECIFIED TRIGGER: Primary | ICD-10-CM

## 2021-10-27 PROCEDURE — 1036F TOBACCO NON-USER: CPT | Performed by: INTERNAL MEDICINE

## 2021-10-27 PROCEDURE — G8420 CALC BMI NORM PARAMETERS: HCPCS | Performed by: INTERNAL MEDICINE

## 2021-10-27 PROCEDURE — G8484 FLU IMMUNIZE NO ADMIN: HCPCS | Performed by: INTERNAL MEDICINE

## 2021-10-27 PROCEDURE — 99213 OFFICE O/P EST LOW 20 MIN: CPT | Performed by: INTERNAL MEDICINE

## 2021-10-27 PROCEDURE — G8427 DOCREV CUR MEDS BY ELIG CLIN: HCPCS | Performed by: INTERNAL MEDICINE

## 2021-10-27 RX ORDER — PREDNISONE 20 MG/1
TABLET ORAL
Qty: 8 TABLET | Refills: 0 | Status: SHIPPED | OUTPATIENT
Start: 2021-10-27 | End: 2022-02-17

## 2021-10-27 ASSESSMENT — PATIENT HEALTH QUESTIONNAIRE - PHQ9
1. LITTLE INTEREST OR PLEASURE IN DOING THINGS: 0
SUM OF ALL RESPONSES TO PHQ QUESTIONS 1-9: 0
SUM OF ALL RESPONSES TO PHQ QUESTIONS 1-9: 0
SUM OF ALL RESPONSES TO PHQ9 QUESTIONS 1 & 2: 0
2. FEELING DOWN, DEPRESSED OR HOPELESS: 0
SUM OF ALL RESPONSES TO PHQ QUESTIONS 1-9: 0

## 2021-10-27 NOTE — PROGRESS NOTES
Mikel Vega (:  2000) is a 24 y.o. male,Established patient, here for evaluation of the following chief complaint(s):  Rash (Rt arm, Rt thumb, itchy, for 2 days)         ASSESSMENT/PLAN:  Radha De Los Santos was seen today for rash. Diagnoses and all orders for this visit:    Allergic contact dermatitis, unspecified trigger    Other orders  -     predniSONE (DELTASONE) 20 MG tablet; Take 3 tablets a day for  1 days then take  2  tablet a day for 2 Days then  1/2 tablet  2 days    possible contact   Dermatitis      Patient Instructions   otc  1 %hydrocortisone cream with methol if you can find that  You can also try lidocaine cream or get. Subjective   SUBJECTIVE/OBJECTIVE:  HPI  Rash started right upper arm 2 days ago  Itchy. No yardwork or hiking    2 weeks ago had a flu shot in that arm  Also getting similar rash on the legs with red dots. Has been taking benadryl  Also tried hydrocortisone cream  No new lotions soaps or deodorants. Review of Systems   Constitutional: Negative for chills and fever. Objective   Physical Exam  Constitutional:       Appearance: Normal appearance. Skin:     Comments: Has slightly raised , red rash right upper arm and axilla  Has rash on the right hand and slight rash both legs      Neurological:      Mental Status: He is alert. Psychiatric:         Mood and Affect: Mood normal.         Behavior: Behavior normal.         Thought Content: Thought content normal.         Judgment: Judgment normal.          An electronic signature was used to authenticate this note.     --Dionisio Hurtado MD

## 2021-10-27 NOTE — PATIENT INSTRUCTIONS
otc  1 %hydrocortisone cream with methol if you can find that  You can also try lidocaine cream or get.

## 2022-02-17 ENCOUNTER — OFFICE VISIT (OUTPATIENT)
Dept: FAMILY MEDICINE CLINIC | Age: 22
End: 2022-02-17
Payer: COMMERCIAL

## 2022-02-17 VITALS
OXYGEN SATURATION: 99 % | BODY MASS INDEX: 20.06 KG/M2 | HEIGHT: 65 IN | DIASTOLIC BLOOD PRESSURE: 62 MMHG | WEIGHT: 120.4 LBS | HEART RATE: 84 BPM | RESPIRATION RATE: 14 BRPM | TEMPERATURE: 98.3 F | SYSTOLIC BLOOD PRESSURE: 106 MMHG

## 2022-02-17 DIAGNOSIS — Z11.3 ROUTINE SCREENING FOR STI (SEXUALLY TRANSMITTED INFECTION): ICD-10-CM

## 2022-02-17 DIAGNOSIS — Z20.2 EXPOSURE TO CHLAMYDIA: Primary | ICD-10-CM

## 2022-02-17 DIAGNOSIS — L50.9 URTICARIA: ICD-10-CM

## 2022-02-17 LAB
BILIRUBIN, POC: NORMAL
BLOOD URINE, POC: NORMAL
CLARITY, POC: NORMAL
COLOR, POC: YELLOW
GLUCOSE URINE, POC: NORMAL
HEPATITIS C ANTIBODY INTERPRETATION: NORMAL
KETONES, POC: NORMAL
LEUKOCYTE EST, POC: NORMAL
NITRITE, POC: NORMAL
PH, POC: 8.5
PROTEIN, POC: NORMAL
SPECIFIC GRAVITY, POC: 1.02
UROBILINOGEN, POC: 0.2

## 2022-02-17 PROCEDURE — G8427 DOCREV CUR MEDS BY ELIG CLIN: HCPCS | Performed by: NURSE PRACTITIONER

## 2022-02-17 PROCEDURE — 81002 URINALYSIS NONAUTO W/O SCOPE: CPT | Performed by: NURSE PRACTITIONER

## 2022-02-17 PROCEDURE — G8420 CALC BMI NORM PARAMETERS: HCPCS | Performed by: NURSE PRACTITIONER

## 2022-02-17 PROCEDURE — G8484 FLU IMMUNIZE NO ADMIN: HCPCS | Performed by: NURSE PRACTITIONER

## 2022-02-17 PROCEDURE — 99214 OFFICE O/P EST MOD 30 MIN: CPT | Performed by: NURSE PRACTITIONER

## 2022-02-17 PROCEDURE — 1036F TOBACCO NON-USER: CPT | Performed by: NURSE PRACTITIONER

## 2022-02-17 RX ORDER — DOXYCYCLINE HYCLATE 100 MG
100 TABLET ORAL 2 TIMES DAILY
Qty: 14 TABLET | Refills: 0 | Status: SHIPPED | OUTPATIENT
Start: 2022-02-17 | End: 2022-02-24

## 2022-02-17 ASSESSMENT — ENCOUNTER SYMPTOMS
SHORTNESS OF BREATH: 0
COUGH: 0
ABDOMINAL PAIN: 0

## 2022-02-17 NOTE — PROGRESS NOTES
2/17/2022    This is a 24 y.o. male   Chief Complaint   Patient presents with   Mikel Kraft Other     girlfriend tested positive for chlamydia. fells pressure in groin    Skin Problem     rash. comes and goes. x2 weeks. arms, legs and chest. itchy. takes antihystamine and it helps   . HPI  Patient reports that his girlfriend tested positive for chlamydia. She was negative for gonorrhea. He reports that he has been having some pressure in his right groin that has been coming and going for the past couple of months. Has been with girlfriend for 4 months. Has been sexually active with other partners in the past. Has had at least 5 partners in lifetime. He reports that he has used condoms in the past, but not currently. Denies dysuria, hematuria. He reports that he will get welts on his skin at night. He will take [employer] antihistamine and then rash will resolve. He will also have itching on skin. He reports that this has been going on for 1.5 months. Denies change in soaps, creams, foods, meds. Uses tide pods for laundry detergent. He is a , so he is exposed to different chemicals at his job. Not able to associate any exposure to his welts. Patient Active Problem List   Diagnosis    Irritable bowel syndrome with diarrhea    Closed displaced fracture of middle third of navicular bone of right wrist          No current outpatient medications on file. No current facility-administered medications for this visit. No Known Allergies    Review of Systems   Constitutional: Negative for activity change and fever. Respiratory: Negative for cough and shortness of breath. Cardiovascular: Negative for chest pain. Gastrointestinal: Negative for abdominal pain. Genitourinary: Negative for decreased urine volume, difficulty urinating, dysuria, frequency, genital sores, hematuria, penile discharge, penile pain, penile swelling, scrotal swelling, testicular pain and urgency. Skin: Positive for rash. Vitals:    02/17/22 1134   BP: 106/62   Site: Right Upper Arm   Position: Sitting   Cuff Size: Medium Adult   Pulse: 84   Resp: 14   Temp: 98.3 °F (36.8 °C)   TempSrc: Oral   SpO2: 99%   Weight: 120 lb 6.4 oz (54.6 kg)   Height: 5' 5\" (1.651 m)       Body mass index is 20.04 kg/m². Wt Readings from Last 3 Encounters:   02/17/22 120 lb 6.4 oz (54.6 kg)   10/27/21 124 lb (56.2 kg)   08/05/21 122 lb 5.7 oz (55.5 kg)       BP Readings from Last 3 Encounters:   02/17/22 106/62   10/27/21 116/70   08/06/21 122/79       Physical Exam  Vitals and nursing note reviewed. Exam conducted with a chaperone present. Constitutional:       General: He is not in acute distress. Appearance: He is well-developed. HENT:      Head: Normocephalic and atraumatic. Cardiovascular:      Rate and Rhythm: Normal rate and regular rhythm. Heart sounds: Normal heart sounds. No murmur heard. No friction rub. No gallop. Pulmonary:      Effort: Pulmonary effort is normal. No respiratory distress. Breath sounds: Normal breath sounds. Abdominal:      Palpations: Abdomen is soft. Tenderness: There is no abdominal tenderness. There is no right CVA tenderness or left CVA tenderness. Hernia: There is no hernia in the left inguinal area or right inguinal area. Genitourinary:     Penis: Circumcised. Testes: Normal.         Right: Mass or tenderness not present. Left: Mass or tenderness not present. Musculoskeletal:      Cervical back: Neck supple. Lymphadenopathy:      Lower Body: No right inguinal adenopathy. No left inguinal adenopathy. Skin:     General: Skin is warm and dry. Findings: No rash. Neurological:      Mental Status: He is alert and oriented to person, place, and time. Psychiatric:         Behavior: Behavior normal.         Thought Content:  Thought content normal.         Judgment: Judgment normal.         Assessmentand Karen  Tee was seen today.    Diagnoses and all orders for this visit:    Exposure to chlamydia  -     POCT Urinalysis no Micro- Urine dipstick shows negative for all components.  -     C.trachomatis N.gonorrhoeae DNA, Urine  -     doxycycline hyclate (VIBRA-TABS) 100 MG tablet; Take 1 tablet by mouth 2 times daily for 7 days  Reviewed treatment guidelines with patient that has showed superior treatment with doxycycline over azithromycin   Advised to avoid sexual activity until after antibiotic is completed. I did encourage for him to practice abstinence until marriage. Should take an over-the-counter probiotic daily while on the antibiotic to help prevent antibiotic associated diarrhea. Routine screening for STI (sexually transmitted infection)  -     HIV Screen; Future  -     Hepatitis C Antibody; Future  -     Syphilis Antibody Cascading Reflex; Future    Urticaria  Does not currently have rash. Advised to switch laundry detergent to All free and clear or when washing clothes to rinse twice. Advised to continue antihistamine daily as that is controlling symptoms. Did briefly discuss if symptoms continued could refer to allergist for allergy testing. Return in about 3 months (around 5/17/2022), or if symptoms worsen or fail to improve, for physical, with Dr. Celso Rodriguez.

## 2022-02-18 LAB
C. TRACHOMATIS DNA ,URINE: POSITIVE
HIV AG/AB: NORMAL
HIV ANTIGEN: NORMAL
HIV-1 ANTIBODY: NORMAL
HIV-2 AB: NORMAL
N. GONORRHOEAE DNA, URINE: NEGATIVE

## 2022-02-19 LAB — RPR: NORMAL

## 2022-02-28 ENCOUNTER — NURSE ONLY (OUTPATIENT)
Dept: PRIMARY CARE CLINIC | Age: 22
End: 2022-02-28
Payer: COMMERCIAL

## 2022-02-28 DIAGNOSIS — J02.9 ACUTE PHARYNGITIS, UNSPECIFIED ETIOLOGY: ICD-10-CM

## 2022-02-28 LAB — S PYO AG THROAT QL: NORMAL

## 2022-02-28 PROCEDURE — 87880 STREP A ASSAY W/OPTIC: CPT | Performed by: FAMILY MEDICINE

## 2022-03-01 ENCOUNTER — OFFICE VISIT (OUTPATIENT)
Dept: FAMILY MEDICINE CLINIC | Age: 22
End: 2022-03-01
Payer: COMMERCIAL

## 2022-03-01 ENCOUNTER — TELEPHONE (OUTPATIENT)
Dept: FAMILY MEDICINE CLINIC | Age: 22
End: 2022-03-01

## 2022-03-01 VITALS
HEART RATE: 96 BPM | HEIGHT: 65 IN | RESPIRATION RATE: 18 BRPM | SYSTOLIC BLOOD PRESSURE: 110 MMHG | OXYGEN SATURATION: 98 % | DIASTOLIC BLOOD PRESSURE: 70 MMHG | BODY MASS INDEX: 19.99 KG/M2 | WEIGHT: 120 LBS

## 2022-03-01 DIAGNOSIS — J02.9 ACUTE PHARYNGITIS, UNSPECIFIED ETIOLOGY: Primary | ICD-10-CM

## 2022-03-01 LAB
REASON FOR REJECTION: NORMAL
REJECTED TEST: NORMAL

## 2022-03-01 PROCEDURE — 99213 OFFICE O/P EST LOW 20 MIN: CPT | Performed by: FAMILY MEDICINE

## 2022-03-01 PROCEDURE — G8420 CALC BMI NORM PARAMETERS: HCPCS | Performed by: FAMILY MEDICINE

## 2022-03-01 PROCEDURE — G8484 FLU IMMUNIZE NO ADMIN: HCPCS | Performed by: FAMILY MEDICINE

## 2022-03-01 PROCEDURE — G8427 DOCREV CUR MEDS BY ELIG CLIN: HCPCS | Performed by: FAMILY MEDICINE

## 2022-03-01 PROCEDURE — 1036F TOBACCO NON-USER: CPT | Performed by: FAMILY MEDICINE

## 2022-03-01 RX ORDER — FLUCONAZOLE 200 MG/1
TABLET ORAL
Qty: 4 TABLET | Refills: 0 | Status: SHIPPED | OUTPATIENT
Start: 2022-03-01 | End: 2022-03-08

## 2022-03-01 NOTE — TELEPHONE ENCOUNTER
Claudene Job says he is getting worse. Maybe we can just see him here this afternoon. Can repeat the test then. Please call him.

## 2022-03-01 NOTE — TELEPHONE ENCOUNTER
Janina from [facility] called stating that this patient had a rejected lab (Margarita Keith)  Because this is usually collected by a urine sample for males, however this was collected by a swab.     Leslie Wylie can be reached at [phone]    Please Advise

## 2022-03-01 NOTE — PROGRESS NOTES
3/1/2022    Blood pressure 110/70, pulse 96, resp. rate 18, height 5' 5\" (1.651 m), weight 120 lb (54.4 kg), SpO2 98 %. Felisa Whitmore (:  2000) is a 24 y.o. male, here for evaluation of the following medical concerns:    Chief Complaint   Patient presents with    Other     Pt is here because he woke up friday bump on his lypth notes on the right side of his neck. Pt said he is more noticated it friday night but it is causing him alot of pain now. Pt said that he is also very fatiguted and not sure if they are relatied. Here for sore throat. Started 4 days ago. Gradually worse. + swollen LN on R; radiates to ear. Under tongue. Also roof of mouth.  + pain with swallow. No f/c  No fatigue  Mild ha  No cough or RN  GF does not have illness    Girlfriend tested pos for chlamydia a few weeks ago. He was tested for this also and was + for chlamydia, not gonorrhea. He was treated with doxy for 7 days. His sore throat started at the end of his treatment. No treatment aside from tylenol. Patient Active Problem List   Diagnosis    Irritable bowel syndrome with diarrhea    Closed displaced fracture of middle third of navicular bone of right wrist        Body mass index is 19.97 kg/m². Wt Readings from Last 3 Encounters:   22 120 lb (54.4 kg)   22 120 lb 6.4 oz (54.6 kg)   10/27/21 124 lb (56.2 kg)       BP Readings from Last 3 Encounters:   22 110/70   22 106/62   10/27/21 116/70       No Known Allergies    Prior to Visit Medications    Medication Sig Taking? Authorizing Provider   fluconazole (DIFLUCAN) 200 MG tablet Take 1 tablet by mouth daily for 1 day, THEN 0.5 tablets daily for 6 days.  Yes Eloise Temple MD   Magic Mouthwash (MIRACLE MOUTHWASH) Viscous Lidocaine 2% solution x 60 ml  Aluminum-magnesium hydroxide-Simethicone 200-200-20 mg/5ml x 60 ml  Diphenhydramine 12.5/5ml liquid x 60ml  Nystatin 382103 unit/ml susp x 60 ml    5 ml swish and swallow Q 2 hrs prn for sore throat Yes Chani Gaxiola MD        Social History     Tobacco Use    Smoking status: Never Smoker    Smokeless tobacco: Never Used   Vaping Use    Vaping Use: Never used   Substance Use Topics    Alcohol use: No    Drug use: Not Currently       Review of Systems    Physical Exam  Vitals and nursing note reviewed. Constitutional:       General: He is not in acute distress. Appearance: Normal appearance. He is well-developed. He is not diaphoretic. HENT:      Head: Normocephalic and atraumatic. Right Ear: Tympanic membrane, ear canal and external ear normal.      Left Ear: Tympanic membrane, ear canal and external ear normal.      Nose: Nose normal. No congestion or rhinorrhea. Mouth/Throat:      Mouth: Mucous membranes are moist.      Pharynx: Posterior oropharyngeal erythema present. No oropharyngeal exudate. Comments: Mild R tonsillar enlargement, eruthema. No exudate  + white tongue coating and eruthema. Tender also on R  Eyes:      General: No scleral icterus. Right eye: No discharge. Left eye: No discharge. Conjunctiva/sclera: Conjunctivae normal.      Pupils: Pupils are equal, round, and reactive to light. Neck:      Thyroid: No thyromegaly. Cardiovascular:      Rate and Rhythm: Normal rate and regular rhythm. Pulses: Normal pulses. Heart sounds: Normal heart sounds. No murmur heard. Pulmonary:      Effort: Pulmonary effort is normal. No respiratory distress. Breath sounds: Normal breath sounds. No wheezing, rhonchi or rales. Chest:   Breasts:      Right: No supraclavicular adenopathy. Left: No supraclavicular adenopathy. Musculoskeletal:      Cervical back: Normal range of motion and neck supple. No rigidity. No muscular tenderness. Right lower leg: No edema. Left lower leg: No edema. Lymphadenopathy:      Cervical: Cervical adenopathy (R ant cerv) present.       Upper Body: Right upper body: No supraclavicular adenopathy. Left upper body: No supraclavicular adenopathy. Skin:     General: Skin is warm and dry. Neurological:      General: No focal deficit present. Mental Status: He is alert and oriented to person, place, and time. Mental status is at baseline. Psychiatric:         Mood and Affect: Mood normal.         Behavior: Behavior normal.         Thought Content: Thought content normal.         Judgment: Judgment normal.         ASSESSMENT/PLAN:    1. Acute pharyngitis, unspecified etiology  - perhaps this is thrush- on set at end of ABX, tongue coating.  - will do throat GC/chlamydia swab, but start treatment with diflucan.  - another possibility is mono. - C.trachomatis N.gonorrhoeae DNA    rx for magic mouthwash for symptoms. Also discussed OTC options. Return if symptoms worsen or fail to improve. An  electronicsignature was used to authenticate this note.     --Joya aZmora MD on 3/1/2022 at 6:09 PM

## 2022-03-03 ENCOUNTER — TELEPHONE (OUTPATIENT)
Dept: FAMILY MEDICINE CLINIC | Age: 22
End: 2022-03-03

## 2022-03-03 LAB
C TRACH DNA GENITAL QL NAA+PROBE: NEGATIVE
N. GONORRHOEAE DNA: NEGATIVE

## 2022-03-03 NOTE — TELEPHONE ENCOUNTER
Patient calling insurance will not cover miracle mouthwash without a pre authorization   Please start pa   Please advise

## 2022-03-07 NOTE — TELEPHONE ENCOUNTER
Submitted PA for First-Mouthwash BLM suspension  [facility]ia [facility] Key: [id number]. Per plan this medication id not loaded to their database at this time. Since the medication is not included in their database drug file, they are unable to process and authorize this request. Letter is attached. If this requires a response please respond to the pool. [facility] [address] [place] [address])    Please advise patient. Thank you.

## 2022-03-24 ENCOUNTER — HOSPITAL ENCOUNTER (OUTPATIENT)
Dept: MRI IMAGING | Age: 22
Discharge: HOME | End: 2022-03-24
Payer: COMMERCIAL

## 2022-03-24 DIAGNOSIS — S46.811A TRAUMATIC TEAR OF SUPRASPINATUS TENDON OF RIGHT SHOULDER, INITIAL ENCOUNTER: ICD-10-CM

## 2022-03-24 PROCEDURE — 73221 MRI JOINT UPR EXTREM W/O DYE: CPT

## 2022-04-05 ENCOUNTER — OFFICE VISIT (OUTPATIENT)
Dept: ORTHOPEDIC SURGERY | Age: 22
End: 2022-04-05
Payer: COMMERCIAL

## 2022-04-05 ENCOUNTER — TELEPHONE (OUTPATIENT)
Dept: ORTHOPEDIC SURGERY | Age: 22
End: 2022-04-05

## 2022-04-05 VITALS — RESPIRATION RATE: 14 BRPM | HEIGHT: 65 IN | WEIGHT: 117.6 LBS | BODY MASS INDEX: 19.59 KG/M2

## 2022-04-05 DIAGNOSIS — S40.011A CONTUSION OF RIGHT SHOULDER, INITIAL ENCOUNTER: Primary | ICD-10-CM

## 2022-04-05 DIAGNOSIS — S43.401A SPRAIN OF RIGHT SHOULDER, UNSPECIFIED SHOULDER SPRAIN TYPE, INITIAL ENCOUNTER: ICD-10-CM

## 2022-04-05 PROCEDURE — 99244 OFF/OP CNSLTJ NEW/EST MOD 40: CPT | Performed by: ORTHOPAEDIC SURGERY

## 2022-04-05 RX ORDER — METHYLPREDNISOLONE 4 MG/1
TABLET ORAL
Qty: 1 KIT | Refills: 0 | Status: SHIPPED | OUTPATIENT
Start: 2022-04-05 | End: 2022-04-11

## 2022-04-05 NOTE — LETTER
[facility]  [facility] [address] [address]  Phone: [phone]  Fax: [phone]    Washington Villagran MD        April 5, 2022     Patient: Mae Garcia   YOB: 2000   Date of Visit: 4/5/2022       To Whom It May Concern: It is my medical opinion that Christina Sanchez may return to work on 4/5/2022 with the following restrictions:    Desk/clerical duties   No field work at this time   Valeri in place for 2 months   If these restrictions are unable to be accommodated, the patient will need to be off work. If you have any questions or concerns, please don't hesitate to call.     Sincerely,    Washington Villagran MD

## 2022-04-05 NOTE — PROGRESS NOTES
[employer]    Occupation:  / EMT     Employer: Did not disclose   Tobacco Use    Smoking status: Never Smoker    Smokeless tobacco: Never Used   Vaping Use    Vaping Use: Never used   Substance and Sexual Activity    Alcohol use: No    Drug use: Not Currently    Sexual activity: Yes   Other Topics Concern    Not on file   Social History Narrative    ** Merged History Encounter **          Social Determinants of Health     Financial Resource Strain:     Difficulty of Paying Living Expenses: Not on file   Food Insecurity:     Worried About Running Out of Food in the Last Year: Not on file    Rosalina of Food in the Last Year: Not on file   Transportation Needs:     Lack of Transportation (Medical): Not on file    Lack of Transportation (Non-Medical):  Not on file   Physical Activity:     Days of Exercise per Week: Not on file    Minutes of Exercise per Session: Not on file   Stress:     Feeling of Stress : Not on file   Social Connections:     Frequency of Communication with Friends and Family: Not on file    Frequency of Social Gatherings with Friends and Family: Not on file    Attends Tenriism Services: Not on file    Active Member of Clubs or Organizations: Not on file    Attends Club or Organization Meetings: Not on file    Marital Status: Not on file   Intimate Partner Violence:     Fear of Current or Ex-Partner: Not on file    Emotionally Abused: Not on file    Physically Abused: Not on file    Sexually Abused: Not on file   Housing Stability:     Unable to Pay for Housing in the Last Year: Not on file    Number of [place] in the Last Year: Not on file    Unstable Housing in the Last Year: Not on file       Family History   Problem Relation Age of Onset    No Known Problems Mother     No Known Problems Father         physical disability from MVA    No Known Problems Sister     No Known Problems Sister     No Known Problems Sister     Heart Attack Maternal Grandfather     Heart Disease Maternal Grandfather     No Known Problems Paternal Grandmother     Heart Attack Paternal Grandfather        Review of Systems:   I have reviewed the clinically relevant past medical history, medications, allergies, family history, social history, and 13 point Review of Systems from the patient's recent history form & documented any details relevant to today's presenting complaints in the history above. The patient's self-reported past medical history, medications, allergies, family history, social history, and Review of Systems form from 4/5/22 have been scanned into the chart under the \"Media\" tab. Physical Examination:      Vital signs:  Resp 14   Ht 5' 5\" (1.651 m)   Wt 117 lb 9.6 oz (53.3 kg)   BMI 19.57 kg/m²     General:   alert, appears stated age, cooperative and no distress   Right Shoulder   Active ROM:   forward flexion 180, external rotation 80, internal rotation L4. Bilateral shoulders   Joint Tenderness:   posterior humeral head   Neer:   positive posterior   Hardin:   negative   Strength:   5/5 Supraspinatus, External rotation    Bilateral shoulders   Drop-arm test:   negative   Belly-press test:   negative   Bear-hug test:   negative   Speed's test:   negative   Bicipital groove tenderness:  negative   Murry's test:   negative   Cross-body adduction test:   negative    AC joint tenderness:   negative     There are no skin lesions, cellulitis, or extreme edema in the upper extremities. Sensation is grossly intact to light touch bilaterally upper extremity. The patient has warm and well-perfused Bilateral upper extremities with brisk capillary refill. Imaging   Right Shoulder X-Ray: 3 view x-rays of the shoulder including AP, scapular Y, and axillary obtained and reviewed  AC Joint: no abnormalities noted  Glenohumeral joint: no abnormalities noted  Elevation humeral head: absent    Right Shoulder MRI 3/24/22:  I independently reviewed the images, as well as the radiology report. . Bone contusion at the posterolateral humeral head.  No obvious osseous   Bankart deformity. 2. No partial or full thickness rotator cuff tear.  Mild supraspinatus and   infraspinatus tendinosis. 3. Tearing of the inferior and anterior inferior labrum.  Mild underlying   diffuse labral degeneration.  2 mm anterior paralabral cyst.   4. Mild glenohumeral chondromalacia. 5. Mild degenerative change of the right AC joint. Assessment:      Right shoulder contusion  Right shoulder labral tear      Plan:      Natural history and expected course discussed. Questions answered. Discussed with patient that I think most of the symptoms are coming from a shoulder contusion, as most of his tenderness was posterior. We discussed that he does have evidence of anterior labral tear, which appears to be small. Provocative test for his labrum were negative. I do not think this is symptomatic. However, given his occupation I will recommend physical therapy to work on range of motion strengthening so that he can have full function while at work. We will request PT from [facility]. PT referral was provided, but he cannot start until approved by [facility]. We will also request shoulder injection from [facility] if his symptoms do not improve. Prescription for Medrol Dosepak E scribed. I do recommend he continue to frequently apply ice to his posterior humeral head to help decrease the bone edema. Recommend he continue OTC NSAIDs to also help decrease posterior humeral head edema. 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 He also do work at [employer]. Follow-up in 6-8 weeks. If his symptoms significantly improve, he can follow-up earlier to see if we can return him to full duty. We will need to request the diagnosis of shoulder contusion. Chapin Salinas. Yifan Sawyer MD  Orthopaedic Surgery and Sports Medicine     Disclaimer:   This note was generated with use of a verbal recognition program and an attempt was made to check for errors. It is possible that there are still dictated errors within this office note. If so, please bring any significant errors to my attention for an addendum. All efforts were made to ensure that this office note is accurate.

## 2022-04-06 ENCOUNTER — TELEPHONE (OUTPATIENT)
Dept: ORTHOPEDIC SURGERY | Age: 22
End: 2022-04-06

## 2022-04-06 NOTE — TELEPHONE ENCOUNTER
Following the patient's visit on 4/5/2022, Dr. Brian Seo wishes to request the following for the patient:    Physical therapy - referral placed in chart     Please advise clinical staff if further action is needed and/or when approval is received.

## 2022-04-11 ENCOUNTER — OFFICE VISIT (OUTPATIENT)
Dept: FAMILY MEDICINE CLINIC | Age: 22
End: 2022-04-11
Payer: COMMERCIAL

## 2022-04-11 VITALS
OXYGEN SATURATION: 97 % | HEART RATE: 100 BPM | DIASTOLIC BLOOD PRESSURE: 72 MMHG | BODY MASS INDEX: 19.33 KG/M2 | SYSTOLIC BLOOD PRESSURE: 104 MMHG | HEIGHT: 65 IN | WEIGHT: 116 LBS

## 2022-04-11 DIAGNOSIS — R63.4 WEIGHT LOSS, ABNORMAL: Primary | ICD-10-CM

## 2022-04-11 DIAGNOSIS — R68.81 EARLY SATIETY: ICD-10-CM

## 2022-04-11 DIAGNOSIS — R53.82 CHRONIC FATIGUE: ICD-10-CM

## 2022-04-11 DIAGNOSIS — R63.4 WEIGHT LOSS, ABNORMAL: ICD-10-CM

## 2022-04-11 LAB
A/G RATIO: 1.6 (ref 1.1–2.2)
ALBUMIN SERPL-MCNC: 5.2 G/DL (ref 3.4–5)
ALP BLD-CCNC: 70 U/L (ref 40–129)
ALT SERPL-CCNC: 20 U/L (ref 10–40)
ANION GAP SERPL CALCULATED.3IONS-SCNC: 16 MMOL/L (ref 3–16)
AST SERPL-CCNC: 24 U/L (ref 15–37)
BASOPHILS ABSOLUTE: 0 K/UL (ref 0–0.2)
BASOPHILS RELATIVE PERCENT: 0.4 %
BILIRUB SERPL-MCNC: 1.7 MG/DL (ref 0–1)
BUN BLDV-MCNC: 11 MG/DL (ref 7–20)
CALCIUM SERPL-MCNC: 10 MG/DL (ref 8.3–10.6)
CHLORIDE BLD-SCNC: 101 MMOL/L (ref 99–110)
CO2: 24 MMOL/L (ref 21–32)
CREAT SERPL-MCNC: 1 MG/DL (ref 0.9–1.3)
EOSINOPHILS ABSOLUTE: 0 K/UL (ref 0–0.6)
EOSINOPHILS RELATIVE PERCENT: 0.8 %
GFR AFRICAN AMERICAN: >60
GFR NON-AFRICAN AMERICAN: >60
GLUCOSE BLD-MCNC: 101 MG/DL (ref 70–99)
HCT VFR BLD CALC: 46.9 % (ref 40.5–52.5)
HEMOGLOBIN: 16.5 G/DL (ref 13.5–17.5)
LYMPHOCYTES ABSOLUTE: 2.3 K/UL (ref 1–5.1)
LYMPHOCYTES RELATIVE PERCENT: 41.7 %
MCH RBC QN AUTO: 30.7 PG (ref 26–34)
MCHC RBC AUTO-ENTMCNC: 35.1 G/DL (ref 31–36)
MCV RBC AUTO: 87.4 FL (ref 80–100)
MONOCYTES ABSOLUTE: 0.3 K/UL (ref 0–1.3)
MONOCYTES RELATIVE PERCENT: 5.1 %
NEUTROPHILS ABSOLUTE: 2.9 K/UL (ref 1.7–7.7)
NEUTROPHILS RELATIVE PERCENT: 52 %
PDW BLD-RTO: 12.7 % (ref 12.4–15.4)
PLATELET # BLD: 237 K/UL (ref 135–450)
PMV BLD AUTO: 8.8 FL (ref 5–10.5)
POTASSIUM SERPL-SCNC: 5.1 MMOL/L (ref 3.5–5.1)
RBC # BLD: 5.37 M/UL (ref 4.2–5.9)
SEDIMENTATION RATE, ERYTHROCYTE: 3 MM/HR (ref 0–15)
SODIUM BLD-SCNC: 141 MMOL/L (ref 136–145)
TOTAL PROTEIN: 8.4 G/DL (ref 6.4–8.2)
TSH SERPL DL<=0.05 MIU/L-ACNC: 3.28 UIU/ML (ref 0.27–4.2)
WBC # BLD: 5.6 K/UL (ref 4–11)

## 2022-04-11 PROCEDURE — 99214 OFFICE O/P EST MOD 30 MIN: CPT | Performed by: FAMILY MEDICINE

## 2022-04-11 PROCEDURE — G8420 CALC BMI NORM PARAMETERS: HCPCS | Performed by: FAMILY MEDICINE

## 2022-04-11 PROCEDURE — 1036F TOBACCO NON-USER: CPT | Performed by: FAMILY MEDICINE

## 2022-04-11 PROCEDURE — G8427 DOCREV CUR MEDS BY ELIG CLIN: HCPCS | Performed by: FAMILY MEDICINE

## 2022-04-11 RX ORDER — MIRTAZAPINE 15 MG/1
15 TABLET, FILM COATED ORAL NIGHTLY
Qty: 30 TABLET | Refills: 3 | Status: SHIPPED | OUTPATIENT
Start: 2022-04-11 | End: 2022-05-09

## 2022-04-11 NOTE — PROGRESS NOTES
2022    Blood pressure 104/72, pulse 100, height 5' 5\" (1.651 m), weight 116 lb (52.6 kg), SpO2 97 %. Teo Granados (:  2000) is a 24 y.o. male, here for evaluation of the following medical concerns:    Chief Complaint   Patient presents with    Weight Loss     last 4-5 months losing weight. he is trying to gain - was 124 in 2021     Here for concern of weight loss  Wt 124 lbs 10/2021  Wt 123 lbs 3/2020      He is trying really hard to eat, gain weight, but does not feel hungry and does not feel like eating often. Takes a few bites and feels done. Cannot finish meals. Denies any pain anywhere. Does have hx IBS and has freqeunt nausea and abd cramping. Less now than when he was younger. Still has frequent loose bm's- victor manuel in AM's. Stools soft, formed. Not watery or ragged. No oily stools. Denies polyuria/nocturia  No f/c/ns  No headaches on a regular basis  No chest pains, dizziness, heart palpitations, dyspnea, lightheadedness, worsening edema. No hematuria    + fatigue despite sleeping well. He feels depressed/anxious periodically. Is split from his GF by his choice. Is a bit lonely/sad currently. He notes his appetite is less when he is stressed. He has not seen GI before. He is currently unable to exercise due to a shoulder injury. 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 He had CT ab pelvis  for abdominal pain and bloody stool. Patient Active Problem List   Diagnosis    Irritable bowel syndrome with diarrhea    Closed displaced fracture of middle third of navicular bone of right wrist        Body mass index is 19.3 kg/m².     Wt Readings from Last 3 Encounters:   22 116 lb (52.6 kg)   22 117 lb 9.6 oz (53.3 kg)   22 120 lb (54.4 kg)       BP Readings from Last 3 Encounters:   22 104/72   22 110/70   22 106/62       No Known Allergies    Prior to Visit Medications Medication Sig Taking? Authorizing Provider   methylPREDNISolone (MEDROL, AXEL,) 4 MG tablet Take by mouth. Patient not taking: Reported on 4/11/2022  Vero Zhang MD        Social History     Tobacco Use    Smoking status: Never Smoker    Smokeless tobacco: Never Used   Vaping Use    Vaping Use: Never used   Substance Use Topics    Alcohol use: No    Drug use: Not Currently       Review of Systems    Physical Exam  Vitals and nursing note reviewed. Constitutional:       General: He is not in acute distress. Appearance: Normal appearance. He is well-developed. He is not diaphoretic. HENT:      Head: Normocephalic and atraumatic. Right Ear: Tympanic membrane, ear canal and external ear normal.      Left Ear: Tympanic membrane, ear canal and external ear normal.      Nose: Nose normal. No congestion or rhinorrhea. Mouth/Throat:      Mouth: Mucous membranes are moist.      Pharynx: Oropharynx is clear. No oropharyngeal exudate or posterior oropharyngeal erythema. Eyes:      General: No scleral icterus. Right eye: No discharge. Left eye: No discharge. Conjunctiva/sclera: Conjunctivae normal.      Pupils: Pupils are equal, round, and reactive to light. Cardiovascular:      Rate and Rhythm: Normal rate and regular rhythm. Pulses: Normal pulses. Heart sounds: Normal heart sounds. No murmur heard. Pulmonary:      Effort: Pulmonary effort is normal. No respiratory distress. Breath sounds: Normal breath sounds. No wheezing or rales. Abdominal:      General: Bowel sounds are normal. There is no distension. Palpations: Abdomen is soft. There is no mass. Tenderness: There is abdominal tenderness (vague, mild diffuse tenderness). There is no right CVA tenderness, left CVA tenderness, guarding or rebound. Hernia: No hernia is present. Musculoskeletal:         General: No swelling. Cervical back: Normal range of motion and neck supple. Right lower leg: No edema. Left lower leg: No edema. Lymphadenopathy:      Cervical: No cervical adenopathy. Skin:     General: Skin is warm and dry. Capillary Refill: Capillary refill takes less than 2 seconds. Neurological:      General: No focal deficit present. Mental Status: He is alert and oriented to person, place, and time. Mental status is at baseline. Psychiatric:         Mood and Affect: Mood normal.         Behavior: Behavior normal.         Thought Content: Thought content normal.         Judgment: Judgment normal.         ASSESSMENT/PLAN:    1. Weight loss, abnormal  - cause not clear, though suspect stress affecting IBS affecting appetite. - check labs/imaging.    - start mirtazapine 15 mg for appetite stim and mood. - Consider GI referral if does not improve. - CBC with Auto Differential; Future  - TSH; Future  - Comprehensive Metabolic Panel; Future  - Sedimentation Rate; Future  - CT ABDOMEN PELVIS W IV CONTRAST Additional Contrast? Oral; Future    2. Early satiety  - check CT for mass/obstruction  - CT ABDOMEN PELVIS W IV CONTRAST Additional Contrast? Oral; Future    3. Chronic fatigue  - suspect depression. Checking labs as above. Return in about 4 weeks (around 5/9/2022) for f/u wt loss. An  [employer]ature was used to authenticate this note.     --Pat Burnham MD on 4/11/2022 at 2:44 PM

## 2022-04-13 DIAGNOSIS — R77.9 ELEVATED SERUM PROTEIN LEVEL: Primary | ICD-10-CM

## 2022-04-14 ENCOUNTER — TELEPHONE (OUTPATIENT)
Dept: ORTHOPEDIC SURGERY | Age: 22
End: 2022-04-14

## 2022-04-18 ENCOUNTER — HOSPITAL ENCOUNTER (OUTPATIENT)
Dept: CT IMAGING | Age: 22
Discharge: HOME | End: 2022-04-18
Payer: COMMERCIAL

## 2022-04-18 DIAGNOSIS — R68.81 EARLY SATIETY: ICD-10-CM

## 2022-04-18 DIAGNOSIS — R63.4 WEIGHT LOSS, ABNORMAL: ICD-10-CM

## 2022-04-18 PROCEDURE — 74177 CT ABD & PELVIS W/CONTRAST: CPT

## 2022-04-18 PROCEDURE — 6360000004 HC RX CONTRAST MEDICATION: Performed by: FAMILY MEDICINE

## 2022-04-18 RX ADMIN — IOPAMIDOL 75 ML: 755 INJECTION, SOLUTION INTRAVENOUS at 13:09

## 2022-04-18 RX ADMIN — IOHEXOL 50 ML: 240 INJECTION, SOLUTION INTRATHECAL; INTRAVASCULAR; INTRAVENOUS; ORAL at 13:12

## 2022-04-26 NOTE — FLOWSHEET NOTE
"Patient called to report problems with her brush catheter. She reports that it has "come loose." Patient described the purple piece that holds the sutures. She is worried about the "rest of the line coming out." Patient also said, "there is white inflammation" beneath the dressing. Mrs. Cook reports that the tubing and connector are intact and that her medication is running. She was advised to come to the ED and she agreed. Notified HTS and TSU.  " [address] [facility] [address] [place] [address]  Phone: [phone]   Fax:     [phone]        Physical Therapy Treatment Note/ Progress Report:       Date:  2022    Patient Name:  Jose Valencia    :  2000  MRN: [id number]    Pertinent Medical History:Additional Pertinent Hx: asthma, adhd, ibs, right wrist fracture    Medical/Treatment Diagnosis Information:  · Diagnosis: S43.401A (ICD-10-CM) - Sprain of right shoulder, unspecified shoulder sprain type, initial encounter  · Treatment Diagnosis: decreased abilty to use right ue and work    Insurance/Certification information:  PT [email or web address]  Physician Information:  Referring Provider (secondary): Dr. Mena Sehpard of care signed (Y/N): routed  Imaging   Right Shoulder X-Ray: 3 view x-rays of the shoulder including AP, scapular Y, and axillary obtained and reviewed  [facility] Joint: no abnormalities noted  Glenohumeral joint: no abnormalities noted  Elevation humeral head: absent     Right Shoulder MRI 3/24/22: I independently reviewed the images, as well as the radiology report. . Bone contusion at the posterolateral humeral head.  No obvious osseous   Bankart deformity. 2. No partial or full thickness rotator cuff tear.  Mild supraspinatus and   infraspinatus tendinosis. 3. Tearing of the inferior and anterior inferior labrum.  Mild underlying   diffuse labral degeneration.  2 mm anterior paralabral cyst.   4. Mild glenohumeral chondromalacia.    5. Mild degenerative change of the right AC joint.        Date of Patient follow up with Physician:      Progress Report: []  Yes  [x]  No     Date Range for reporting period:  Beginnin2022  Ending:      Progress report due (10 Rx/or 30 days whichever is less):      Recertification due (POC duration/ or 90 days whichever is less):      Visit # POC/Insurance Allowable Auth Needed   1 12 by 6/1/22 per 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 He c/o constant pain which is sharp with repetitive movements or attempted lifting. He also can';t reach behind his back or work overhead. He has a humeral head bruise and 2 small labral tears. 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 SUBJECTIVE: Pt states, \" I can't use my arm like I need to \"    OBJECTIVE:      ROM Left Right   Shoulder Flex wfl all 170   Shoulder Abd   120   Shoulder ER   60   Shoulder IR   L4                   Strength  Left Right   Shoulder Flex 5/5 4-   Shoulder Scap   4-   Shoulder ER   4-   Shoulder IR   4-                  RESTRICTIONS/PRECAUTIONS:     Exercises/Interventions:   Therapeutic Ex (23444)  Min: Resistance/Reps Cues/Notes   isos thru range     prom     Prone scap add with ext     Sl er     tband horiz add     scap retract                              Manual Intervention  (97027)  Min:15 Prom, mfr to entire shoulder and scap, traps , levator,               NMR re-education (28169)  Min:     walkouts     Body blade     Ball on wall                              Therapeutic Activity (53169)  Min: Discussed mechanism of injury, anatomy, physiology, biomechanics, sleeping positions,  and strategies to accelerate the healing process. Answered all of patients questions regarding injury. Gave necessary information to get any equipment needed. Modalities:  Min                 Other Therapeutic Activities:  Pt was educated on PT POC, Diagnosis, Prognosis, pathomechanics as well as frequency and duration of scheduling future physical therapy appointments.  Time was also taken on this day to answer all patient questions and participation in PT. Reviewed appointment policy in detail with patient and patient verbalized understanding. Home Exercise Program:Patient demonstrated proper technique, good tolerance,  and was given written instructions for the above exercises  Access Code: [facility]  URL: [email or web address]  Date: 04/27/2022  Prepared by: Pinky Jon    Exercises  W - 1 x daily - 7 x weekly - 3 sets - 10 reps  Supine Scapular Protraction in Flexion with Dumbbells - 1 x daily - 7 x weekly - 3 sets - 10 reps  Wall Push Up with Plus - 1 x daily - 7 x weekly - 3 sets - 10 reps  Shoulder Internal Rotation with Resistance - 1 x daily - 7 x weekly - 3 sets - 10 reps  Shoulder extension with resistance - Neutral - 1 x daily - 7 x weekly - 3 sets - 10 reps    Therapeutic Exercise and NMR EXR  [] (48246) Provided verbal/tactile cueing for activities related to strengthening, flexibility, endurance, ROM  for improvements in scapular, scapulothoracic and UE control with self care, reaching, carrying, lifting, house/yardwork, driving/computer work.    [] (31717) Provided verbal/tactile cueing for activities related to improving balance, coordination, kinesthetic sense, posture, motor skill, proprioception  to assist with  scapular, scapulothoracic and UE control with self care, reaching, carrying, lifting, house/yardwork, driving/computer work. Therapeutic Activities:    [] (04722 or 29282) Provided verbal/tactile cueing for activities related to improving balance, coordination, kinesthetic sense, posture, motor skill, proprioception and motor activation to allow for proper function of scapular, scapulothoracic and UE control with self care, carrying, lifting, driving/computer work.      Home Exercise Program:    [x] (96874) Reviewed/Progressed HEP activities related to strengthening, flexibility, endurance, ROM of scapular, scapulothoracic and UE control with self care, reaching, carrying, lifting, house/yardwork, driving/computer work  [] (68696) Reviewed/Progressed HEP activities related to improving balance, coordination, kinesthetic sense, posture, motor skill, proprioception of scapular, scapulothoracic and UE control with self care, reaching, carrying, lifting, house/yardwork, driving/computer work      Manual Treatments:  PROM / STM / Oscillations-Mobs:  G-I, II, III, IV (PA's, Inf., Post.)  [] ([id number]) Provided manual therapy to mobilize soft tissue/joints of cervical/CT, scapular GHJ and UE for the purpose of modulating pain, promoting relaxation,  increasing ROM, reducing/eliminating soft tissue swelling/inflammation/restriction, improving soft tissue extensibility and allowing for proper ROM for normal function with self care, reaching, carrying, lifting, house/yardwork, driving/computer work    Charges:  Timed Code Treatment Minutes: 30   Total Treatment Minutes: 50       [x] EVAL (LOW) 21545 (typically 20 minutes face-to-face)  [] EVAL (MOD) 61890 (typically 30 minutes face-to-face)  [] EVAL (HIGH) 09379 (typically 45 minutes face-to-face)  [] RE-EVAL     [x] GJ(09273) x 1    [] Dry needle 1 or 2 Muscles (50691)  [] NMR (51269) x     [] Dry needle 3+ Muscles (58725)  [x] Manual (93407) x 1    [] Ultrasound (97122) x  [] TA (51568) x     [] Mech Traction (70171)  [] ES(attended) (53578)     [] ES (un) (27329):   [] Vasopump (57788) [] Ionto (02957)   [] Other:    If [facility] Please Indicate Time In/Out  CPT Code Time in Time out   eval 330 400   man [phone]    exs [phone]                     Approval Dates:  CPT Code Units Approved Units Used  Date Updated:                         ASSESSMENT:  See eval    Treatment/Activity Tolerance:  [x] Patient tolerated treatment well [] Patient limited by fatique  [] Patient limited by pain  [] Patient limited by other medical complications  [] Other:     Overall Progression Towards Functional goals/ Treatment Progress Update:  [] Patient is progressing as expected towards functional goals listed. [] Progression is slowed due to complexities/Impairments listed. [] Progression has been slowed due to co-morbidities. [x] Plan just implemented, too soon to assess goals progression <30days   [] Goals require adjustment due to lack of progress  [] Patient is not progressing as expected and requires additional follow up with physician  [] Other    Prognosis for POC: [x] Good [] Fair  [] Poor    Patient requires continued skilled intervention: [x] Yes  [] No      PLAN: UE arom, aarom, prom, strengthening, scapular strength, myofascial release, joint mobs to gh, sc, ac, scapular thoracic, modalities for pain, hep. He has poor scap position and control. ROM is relatively good. Work on strength of all aspects of the shoulder and progress to heavy weights and activities to mimic work as a . Consider bfr and DN    [] Continue per plan of care [] Alter current plan (see comments)  [x] Plan of care initiated [] Hold pending MD visit [] Discharge    Electronically signed by: Kevon Gordon PT     Note: If patient does not return for scheduled/recommended follow up visits, this note will serve as a discharge from care along with the most recent update on progress.

## 2022-04-27 ENCOUNTER — HOSPITAL ENCOUNTER (OUTPATIENT)
Dept: PHYSICAL THERAPY | Age: 22
Setting detail: THERAPIES SERIES
Discharge: HOME | End: 2022-04-27
Payer: COMMERCIAL

## 2022-04-27 PROCEDURE — 97140 MANUAL THERAPY 1/> REGIONS: CPT

## 2022-04-27 PROCEDURE — 97161 PT EVAL LOW COMPLEX 20 MIN: CPT

## 2022-04-27 PROCEDURE — 97110 THERAPEUTIC EXERCISES: CPT

## 2022-04-29 ENCOUNTER — HOSPITAL ENCOUNTER (OUTPATIENT)
Dept: PHYSICAL THERAPY | Age: 22
Setting detail: THERAPIES SERIES
Discharge: HOME | End: 2022-04-29
Payer: COMMERCIAL

## 2022-04-29 DIAGNOSIS — K90.9 FATTY STOOLS: Primary | ICD-10-CM

## 2022-04-29 DIAGNOSIS — R68.81 EARLY SATIETY: ICD-10-CM

## 2022-04-29 DIAGNOSIS — R63.4 WEIGHT LOSS, ABNORMAL: ICD-10-CM

## 2022-04-29 DIAGNOSIS — R77.9 ELEVATED SERUM PROTEIN LEVEL: ICD-10-CM

## 2022-04-29 PROCEDURE — 97112 NEUROMUSCULAR REEDUCATION: CPT

## 2022-04-29 PROCEDURE — 97140 MANUAL THERAPY 1/> REGIONS: CPT

## 2022-04-29 PROCEDURE — 97110 THERAPEUTIC EXERCISES: CPT

## 2022-04-29 NOTE — FLOWSHEET NOTE
[address] [facility] [address] [place], [address] [facility] [address]  Phone: [phone]   Fax:     [phone]        Physical Therapy Treatment Note/ Progress Report:       Date:  2022    Patient Name:  Nayely Downey    :  2000  MRN: [id number]    Pertinent Medical History:Additional Pertinent Hx: asthma, adhd, ibs, right wrist fracture    Medical/Treatment Diagnosis Information:  · Diagnosis: S43.401A (ICD-10-CM) - Sprain of right shoulder, unspecified shoulder sprain type, initial encounter  · Treatment Diagnosis: decreased abilty to use right ue and work    Insurance/Certification information:  PT Insurance Information: [facility]  Physician Information:  Referring Provider (secondary): Dr. Esmer Suarez of care signed (Y/N): routed  Imaging   Right Shoulder X-Ray: 3 view x-rays of the shoulder including AP, scapular Y, and axillary obtained and reviewed  [facility] [place] Joint: no abnormalities noted  Glenohumeral joint: no abnormalities noted  Elevation humeral head: absent     Right Shoulder MRI 3/24/22: I independently reviewed the images, as well as the radiology report. . Bone contusion at the posterolateral humeral head.  No obvious osseous   Bankart deformity. 2. No partial or full thickness rotator cuff tear.  Mild supraspinatus and   infraspinatus tendinosis. 3. Tearing of the inferior and anterior inferior labrum.  Mild underlying   diffuse labral degeneration.  2 mm anterior paralabral cyst.   4. Mild glenohumeral chondromalacia.    5. Mild degenerative change of the right AC joint.        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 He c/o constant pain which is sharp with repetitive movements or attempted lifting. He also can';t reach behind his back or work overhead. He has a humeral head bruise and 2 small labral tears. 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 SUBJECTIVE: Pt states, \" I can't use my arm like I need to \"  4/29/22  Pt states ,\" just stiff from exercising \"    OBJECTIVE:      ROM Left Right   Shoulder Flex wfl all 170   Shoulder Abd   120   Shoulder ER   60   Shoulder IR   L4                   Strength  Left Right   Shoulder Flex 5/5 4-   Shoulder Scap   4-   Shoulder ER   4-   Shoulder IR   4-                  RESTRICTIONS/PRECAUTIONS:     Exercises/Interventions:   Therapeutic Ex (71148)  Min: Resistance/Reps Cues/Notes   ube X 4 min    isos thru range X 3 min    prom All ranges    Rhythmic stab Er and 90 deg x 3 min total    Prone scap add with ext X 2 min    Sl er     tband horiz add     scap retract     All 4's serratus X 2 min                        Manual Intervention  (46367)  Min:15 Prom, mfr to entire shoulder and scap, traps , levator,               NMR re-education (89067)  Min:     walkouts Blue x 2 min ea side    Body blade X 2 min ea    Ball on wall X 3 min    Er wall slide Red x 2 min    Standing serratus punch Blue x 30                   Therapeutic Activity (83238)  Min: Discussed mechanism of injury, anatomy, physiology, biomechanics, sleeping positions,  and strategies to accelerate the healing process. Answered all of patients questions regarding injury. Gave necessary information to get any equipment needed. Modalities:  Min                 Smart Cuff Pro Smart Phase Protocol       Spoke with Dr. Daniel Romero  regarding the use of BFR with patient. BFR Session 1             Smart Cuff Pro Personalized Tourniquet System for BFR     LE: up to 80% LOP       UE: up to 50% LOP  4/29/22      BFR Location: biceps  BFR set at: 73 mmHg       Total Time under Tourniquet :  8      Protocol: 30/15/15/15       Exercises:   Reps 30 sec Notes    Biceps curls  4# x s        # of reps required  30 Rest     completed  30 30    tband er green reps required  15 Rest     completed  15 30    tband ir green reps required  15 Rest     completed  15 30    Triceps curl reps required  15 Rest     completed  15 30                Other Therapeutic Activities:  Pt was educated on PT POC, Diagnosis, Prognosis, pathomechanics as well as frequency and duration of scheduling future physical therapy appointments. Time was also taken on this day to answer all patient questions and participation in PT. Reviewed appointment policy in detail with patient and patient verbalized understanding. Home Exercise Program:Patient demonstrated proper technique, good tolerance,  and was given written instructions for the above exercises  Access Code: [facility]  URL: [email or web address]  Date: 04/27/2022  Prepared by: Marco Alas    Exercises  W - 1 x daily - 7 x weekly - 3 sets - 10 reps  Supine Scapular Protraction in Flexion with Dumbbells - 1 x daily - 7 x weekly - 3 sets - 10 reps  Wall Push Up with Plus - 1 x daily - 7 x weekly - 3 sets - 10 reps  Shoulder Internal Rotation with Resistance - 1 x daily - 7 x weekly - 3 sets - 10 reps    Shoulder extension with resistance - Neutral - 1 x daily - 7 x weekly - 3 sets - 10 reps    Therapeutic Exercise and NMR EXR                                                                           Hep 4/29/22  Serratus exs with tband,     ER Wall walk with red band  [] (67320) Provided verbal/tactile cueing for activities related to strengthening, flexibility, endurance, ROM  for improvements in scapular, scapulothoracic and UE control with self care, reaching, carrying, lifting, house/yardwork, driving/computer work.    [] (05766) Provided verbal/tactile cueing for activities related to improving balance, coordination, kinesthetic sense, posture, motor skill, proprioception  to assist with  scapular, scapulothoracic and UE control with self care, reaching, carrying, lifting, house/yardwork, driving/computer work. Therapeutic Activities:    [] (64928 or 51809) Provided verbal/tactile cueing for activities related to improving balance, coordination, kinesthetic sense, posture, motor skill, proprioception and motor activation to allow for proper function of scapular, scapulothoracic and UE control with self care, carrying, lifting, driving/computer work.      Home Exercise Program:    [x] (76324) Reviewed/Progressed HEP activities related to strengthening, flexibility, endurance, ROM of scapular, scapulothoracic and UE control with self care, reaching, carrying, lifting, house/yardwork, driving/computer work  [] (85792) Reviewed/Progressed HEP activities related to improving balance, coordination, kinesthetic sense, posture, motor skill, proprioception of scapular, scapulothoracic and UE control with self care, reaching, carrying, lifting, house/yardwork, driving/computer work      Manual Treatments:  PROM / STM / Oscillations-Mobs:  G-I, II, III, IV (PA's, Inf., Post.)  [] (41906) Provided manual therapy to mobilize soft tissue/joints of cervical/CT, scapular GHJ and UE for the purpose of modulating pain, promoting relaxation,  increasing ROM, reducing/eliminating soft tissue swelling/inflammation/restriction, improving soft tissue extensibility and allowing for proper ROM for normal function with self care, reaching, carrying, lifting, house/yardwork, driving/computer work    Charges:  Timed Code Treatment Minutes: 60   Total increase stab exs    [] Continue per plan of care [] Alter current plan (see comments)  [x] Plan of care initiated [] Hold pending MD visit [] Discharge    Electronically signed by: Senait Mccabe PT     Note: If patient does not return for scheduled/recommended follow up visits, this note will serve as a discharge from care along with the most recent update on progress.

## 2022-05-02 LAB
ALBUMIN SERPL-MCNC: 4.1 G/DL (ref 3.1–4.9)
ALPHA-1-GLOBULIN: 0.3 G/DL (ref 0.2–0.4)
ALPHA-2-GLOBULIN: 0.7 G/DL (ref 0.4–1.1)
BETA GLOBULIN: 1 G/DL (ref 0.9–1.6)
GAMMA GLOBULIN: 1.3 G/DL (ref 0.6–1.8)
SPE/IFE INTERPRETATION: NORMAL
TOTAL PROTEIN: 7.4 G/DL (ref 6.4–8.2)

## 2022-05-03 ENCOUNTER — HOSPITAL ENCOUNTER (OUTPATIENT)
Dept: PHYSICAL THERAPY | Age: 22
Setting detail: THERAPIES SERIES
Discharge: HOME | End: 2022-05-03
Payer: COMMERCIAL

## 2022-05-03 PROCEDURE — 97110 THERAPEUTIC EXERCISES: CPT

## 2022-05-03 PROCEDURE — 97140 MANUAL THERAPY 1/> REGIONS: CPT

## 2022-05-03 PROCEDURE — 97112 NEUROMUSCULAR REEDUCATION: CPT

## 2022-05-03 NOTE — FLOWSHEET NOTE
[address] [facility]. [place] [address]  Phone: [phone]   Fax:     [phone]        Physical Therapy Treatment Note/ Progress Report:       Date:  5/3/2022    Patient Name:  Ronnell Collado    :  2000  MRN: [id number]    Pertinent Medical History:Additional Pertinent Hx: asthma, adhd, ibs, right wrist fracture    Medical/Treatment Diagnosis Information:  · Diagnosis: S43.401A (ICD-10-CM) - Sprain of right shoulder, unspecified shoulder sprain type, initial encounter  · Treatment Diagnosis: decreased abilty to use right ue and work    Insurance/Certification information:  PT Insurance Information: [facility]  Physician Information:  Referring Provider (secondary): Dr. Toya Sow of care signed (Y/N): routed  Imaging   Right Shoulder X-Ray: 3 view x-rays of the shoulder including AP, scapular Y, and axillary obtained and reviewed  [facility] Joint: no abnormalities noted  Glenohumeral joint: no abnormalities noted  Elevation humeral head: absent     Right Shoulder MRI 3/24/22: I independently reviewed the images, as well as the radiology report. . Bone contusion at the posterolateral humeral head.  No obvious osseous   Bankart deformity. 2. No partial or full thickness rotator cuff tear.  Mild supraspinatus and   infraspinatus tendinosis. 3. Tearing of the inferior and anterior inferior labrum.  Mild underlying   diffuse labral degeneration.  2 mm anterior paralabral cyst.   4. Mild glenohumeral chondromalacia.    5. Mild degenerative change of the right AC joint.        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 He c/o constant pain which is sharp with repetitive movements or attempted lifting. He also can';t reach behind his back or work overhead. He has a humeral head bruise and 2 small labral tears. 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 SUBJECTIVE: Pt states, \" I can't use my arm like I need to \"  4/29/22  Pt states ,\" just stiff from exercising \"  05/03/22 Patient reports slight soreness when reaching behind the back and to the side. States he still gets some popping in his shoulder.     OBJECTIVE:      ROM Left Right   Shoulder Flex wfl all 170   Shoulder Abd   120   Shoulder ER   60   Shoulder IR   L4                   Strength  Left Right   Shoulder Flex 5/5 4-   Shoulder Scap   4-   Shoulder ER   4-   Shoulder IR   4-                  RESTRICTIONS/PRECAUTIONS:     Exercises/Interventions:   Therapeutic Ex (84048)  Min: Resistance/Reps Cues/Notes   ube X 4 min    isos thru range X 3 min    prom All ranges    Rhythmic stab Er and 90 deg x 3 min total    Prone scap add with ext X 2 min    Sl er     tband horiz add     scap retract     All 4's serratus X 2 min                        Manual Intervention  (38053)  Min:15 Prom, mfr to entire shoulder and scap, traps , levator,               NMR re-education (40976)  Min:     walkouts Blue x 2 min ea side    Body blade X 2 min ea    Ball on wall X 3 min 1 min rhytmic stab    Er wall slide Red x 2 min    Standing serratus punch Blue x 30                   Therapeutic Activity (72763)  Min: Discussed mechanism of injury, anatomy, physiology, biomechanics, sleeping positions,  and strategies to accelerate the healing process. Answered all of patients questions regarding injury. Gave necessary information to get any equipment needed. Modalities:  Min                 Smart Cuff Pro Smart Phase Protocol       Spoke with Dr. Nery Mendoza  regarding the use of BFR with patient. BFR Session 1             Smart Cuff Pro Personalized Tourniquet System for BFR     LE: up to 80% LOP       UE: up to 50% LOP  4/29/22      BFR Location: biceps  BFR set at: 73 mmHg       Total Time under Tourniquet :  8      Protocol: 30/15/15/15       Exercises:   Reps 30 sec Notes    Biceps curls  4# x s        # of reps required  30 Rest     completed  30 30    tband er green reps required  15 Rest     completed  15 30    tband ir green reps required  15 Rest     completed  15 30    Triceps curl reps required  15 Rest     completed  15 30                Other Therapeutic Activities:  Pt was educated on PT POC, Diagnosis, Prognosis, pathomechanics as well as frequency and duration of scheduling future physical therapy appointments. Time was also taken on this day to answer all patient questions and participation in PT. Reviewed appointment policy in detail with patient and patient verbalized understanding. Home Exercise Program:Patient demonstrated proper technique, good tolerance,  and was given written instructions for the above exercises  Access Code: [facility]  URL: [email or web address]  Date: 04/27/2022  Prepared by: Rocky Puente    Exercises  W - 1 x daily - 7 x weekly - 3 sets - 10 reps  Supine Scapular Protraction in Flexion with Dumbbells - 1 x daily - 7 x weekly - 3 sets - 10 reps  Wall Push Up with Plus - 1 x daily - 7 x weekly - 3 sets - 10 reps  Shoulder Internal Rotation with Resistance - 1 x daily - 7 x weekly - 3 sets - 10 reps    Shoulder extension with resistance - Neutral - 1 x daily - 7 x weekly - 3 sets - 10 reps    Therapeutic Exercise and NMR EXR Hep 4/29/22  Serratus exs with tband,     ER Wall walk with red band  [] (41011) Provided verbal/tactile cueing for activities related to strengthening, flexibility, endurance, ROM  for improvements in scapular, scapulothoracic and UE control with self care, reaching, carrying, lifting, house/yardwork, driving/computer work.    [] (81605) Provided verbal/tactile cueing for activities related to improving balance, coordination, kinesthetic sense, posture, motor skill, proprioception  to assist with  scapular, scapulothoracic and UE control with self care, reaching, carrying, lifting, house/yardwork, driving/computer work. Therapeutic Activities:    [] (61529 or 00330) Provided verbal/tactile cueing for activities related to improving balance, coordination, kinesthetic sense, posture, motor skill, proprioception and motor activation to allow for proper function of scapular, scapulothoracic and UE control with self care, carrying, lifting, driving/computer work.      Home Exercise Program:    [x] (85110) Reviewed/Progressed HEP activities related to strengthening, flexibility, endurance, ROM of scapular, scapulothoracic and UE control with self care, reaching, carrying, lifting, house/yardwork, driving/computer work  [] (61897) Reviewed/Progressed HEP activities related to improving balance, coordination, kinesthetic sense, posture, motor skill, proprioception of scapular, scapulothoracic and UE control with self care, reaching, carrying, lifting, house/yardwork, driving/computer work      Manual Treatments:  PROM / STM / Oscillations-Mobs:  G-I, II, III, IV (PA's, Inf., Post.)  [] (94469) Provided manual therapy to mobilize soft tissue/joints of cervical/CT, scapular GHJ and UE for the purpose of modulating pain, promoting relaxation,  increasing ROM, reducing/eliminating soft tissue swelling/inflammation/restriction, improving soft tissue extensibility and allowing for proper ROM for normal function with self care, reaching, carrying, lifting, house/yardwork, driving/computer work    Charges:  Timed Code Treatment Minutes: 60   Total Treatment Minutes: 60       [] EVAL (LOW) 87532 (typically 20 minutes face-to-face)  [] EVAL (MOD) 17623 (typically 30 minutes face-to-face)  [] EVAL (HIGH) 05655 (typically 45 minutes face-to-face)  [] RE-EVAL     [x] BR(54389) x 2    [] Dry needle 1 or 2 Muscles (65382)  [x] NMR (46030) x  1   [] Dry needle 3+ Muscles (36027)  [x] Manual (04287) x 1    [] Ultrasound (85188) x  [] TA (68317) x     [] Mech Traction (21093)  [] ES(attended) (39180)     [] ES (un) (35580):   [] Vasopump (49595) [] Ionto (04097)   [] Other:    If [facility] Please Indicate Time In/Out  CPT Code Time in Time out   man     exs     nmr                      Approval Dates:  CPT Code Units Approved Units Used  Date Updated:                         ASSESSMENT:  See eval    Treatment/Activity Tolerance:  [x] Patient tolerated treatment well [] Patient limited by fatique  [] Patient limited by pain  [] Patient limited by other medical complications  [] Other:     Overall Progression Towards Functional goals/ Treatment Progress Update:  [] Patient is progressing as expected towards functional goals listed. [] Progression is slowed due to complexities/Impairments listed. [] Progression has been slowed due to co-morbidities. [x] Plan just implemented, too soon to assess goals progression <30days   [] Goals require adjustment due to lack of progress  [] Patient is not progressing as expected and requires additional follow up with physician  [] Other    Prognosis for POC: [x] Good [] Fair  [] Poor    Patient requires continued skilled intervention: [x] Yes  [] No      PLAN: UE arom, aarom, prom, strengthening, scapular strength, myofascial release, joint mobs to gh, sc, ac, scapular thoracic, modalities for pain, hep. He has poor scap position and control. ROM is relatively good.   Work on Causata of all aspects of the shoulder and progress to heavy weights and activities to mimic work as a . Consider bfr and DN  4/29/22  Assess bfr, increase stab exs    [] Continue per plan of care [] Alter current plan (see comments)  [x] Plan of care initiated [] Hold pending MD visit [] Discharge    Electronically signed by: Doris Vang PTA     Note: If patient does not return for scheduled/recommended follow up visits, this note will serve as a discharge from care along with the most recent update on progress.

## 2022-05-05 ENCOUNTER — HOSPITAL ENCOUNTER (OUTPATIENT)
Dept: PHYSICAL THERAPY | Age: 22
Setting detail: THERAPIES SERIES
Discharge: HOME | End: 2022-05-05
Payer: COMMERCIAL

## 2022-05-05 PROCEDURE — 97140 MANUAL THERAPY 1/> REGIONS: CPT

## 2022-05-05 PROCEDURE — 97112 NEUROMUSCULAR REEDUCATION: CPT

## 2022-05-05 PROCEDURE — 97110 THERAPEUTIC EXERCISES: CPT

## 2022-05-05 NOTE — FLOWSHEET NOTE
[address] [facility] [place] [address]  Phone: [phone]   Fax:     [phone]        Physical Therapy Treatment Note/ Progress Report:       Date:  2022    Patient Name:  Teo Granados    :  2000  MRN: [id number]    Pertinent Medical History:Additional Pertinent Hx: asthma, adhd, ibs, right wrist fracture    Medical/Treatment Diagnosis Information:  · Diagnosis: S43.401A (ICD-10-CM) - Sprain of right shoulder, unspecified shoulder sprain type, initial encounter  · Treatment Diagnosis: decreased abilty to use right ue and work    Insurance/Certification information:  PT Insurance Information: [facility]  Physician Information:  Referring Provider (secondary): Dr. Montez Olson of care signed (Y/N): routed  Imaging   Right Shoulder X-Ray: 3 view x-rays of the shoulder including AP, scapular Y, and axillary obtained and reviewed  [facility] Joint: no abnormalities noted  Glenohumeral joint: no abnormalities noted  Elevation humeral head: absent     Right Shoulder MRI 3/24/22: I independently reviewed the images, as well as the radiology report. . Bone contusion at the posterolateral humeral head.  No obvious osseous   Bankart deformity. 2. No partial or full thickness rotator cuff tear.  Mild supraspinatus and   infraspinatus tendinosis. 3. Tearing of the inferior and anterior inferior labrum.  Mild underlying   diffuse labral degeneration.  2 mm anterior paralabral cyst.   4. Mild glenohumeral chondromalacia.    5. Mild degenerative change of the right AC joint.        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 He c/o constant pain which is sharp with repetitive movements or attempted lifting. He also can';t reach behind his back or work overhead. He has a humeral head bruise and 2 small labral tears. 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 SUBJECTIVE: Pt states, \" I can't use my arm like I need to \"  4/29/22  Pt states ,\" just stiff from exercising \"  05/03/22 Patient reports slight soreness when reaching behind the back and to the side. States he still gets some popping in his shoulder. 05/05/22 Patient reports shoulder is feeling a little stronger,still getting some popping and soreness.     OBJECTIVE:      ROM Left Right   Shoulder Flex wfl all 170   Shoulder Abd   120   Shoulder ER   60   Shoulder IR   L4                   Strength  Left Right   Shoulder Flex 5/5 4-   Shoulder Scap   4-   Shoulder ER   4-   Shoulder IR   4-                  RESTRICTIONS/PRECAUTIONS:     Exercises/Interventions:   Therapeutic Ex (33692)  Min: Resistance/Reps Cues/Notes   ube L 3 X 4 min    isos thru range X 3 min    prom All ranges    Rhythmic stab Er and 90 deg x 3 min total    Prone scap add with ext X 2 min    Sl er 2 # 2 x 10    tband horiz add     scap retract     All 4's serratus X 2 min                        Manual Intervention  (16100)  Min:15 Prom, mfr to entire shoulder and scap, traps , levator,               NMR re-education (39629)  Min:     walkouts Blue x 2 min ea side    Body blade X 2 min ea    Ball on wall X 3 min 1 min rhytmic stab with green tb    Er wall slide Red x 2 min    Standing serratus punch                    Therapeutic Activity (96434)  Min: Discussed mechanism of injury, anatomy, physiology, biomechanics, sleeping positions,  and strategies to accelerate the healing process. Answered all of patients questions regarding injury. Gave necessary information to get any equipment needed. Modalities:  Min                 Smart Cuff Pro Smart Phase Protocol       Spoke with Dr. Keo Mendiola  regarding the use of BFR with patient. BFR Session 3             Smart Cuff Pro Personalized Tourniquet System for BFR     LE: up to 80% LOP       UE: up to 50% LOP  4/29/22      BFR Location: biceps  BFR set at: 73 mmHg       Total Time under Tourniquet :  8      Protocol: 30/15/15/15       Exercises:   Reps 30 sec Notes    Biceps curls  4# x s        # of reps required  30 Rest     completed  30 30    tband er green reps required  15 Rest     completed  15 30    tband ir green reps required  15 Rest     completed  15 30    Triceps curl reps required  15 Rest     completed  15 30                Other Therapeutic Activities:  Pt was educated on PT POC, Diagnosis, Prognosis, pathomechanics as well as frequency and duration of scheduling future physical therapy appointments. Time was also taken on this day to answer all patient questions and participation in PT. Reviewed appointment policy in detail with patient and patient verbalized understanding. Home Exercise Program:Patient demonstrated proper technique, good tolerance,  and was given written instructions for the above exercises  Access Code: [facility]  URL: [email or web address]  Date: 04/27/2022  Prepared by: Kota Winslow    Exercises  W - 1 x daily - 7 x weekly - 3 sets - 10 reps  Supine Scapular Protraction in Flexion with Dumbbells - 1 x daily - 7 x weekly - 3 sets - 10 reps  Wall Push Up with Plus - 1 x daily - 7 x weekly - 3 sets - 10 reps  Shoulder Internal Rotation with Resistance - 1 x daily - 7 x weekly - 3 sets - 10 reps    Shoulder extension with resistance - Neutral - 1 x daily - 7 x weekly - 3 sets - 10 reps    Therapeutic Exercise and NMR EXR                                                                           Hep 4/29/22  Serratus exs with tband,     ER Wall walk with red band  [] (19175) Provided verbal/tactile cueing for activities related to strengthening, flexibility, endurance, ROM  for improvements in scapular, scapulothoracic and UE control with self care, reaching, carrying, lifting, house/yardwork, driving/computer work.    [] (26293) Provided verbal/tactile cueing for activities related to improving balance, coordination, kinesthetic sense, posture, motor skill, proprioception  to assist with  scapular, scapulothoracic and UE control with self care, reaching, carrying, lifting, house/yardwork, driving/computer work. Therapeutic Activities:    [] (13517 or 32285) Provided verbal/tactile cueing for activities related to improving balance, coordination, kinesthetic sense, posture, motor skill, proprioception and motor activation to allow for proper function of scapular, scapulothoracic and UE control with self care, carrying, lifting, driving/computer work.      Home Exercise Program:    [x] (59115) Reviewed/Progressed HEP activities related to strengthening, flexibility, endurance, ROM of scapular, scapulothoracic and UE control with self care, reaching, carrying, lifting, house/yardwork, driving/computer work  [] (85925) Reviewed/Progressed HEP activities related to improving balance, coordination, kinesthetic sense, posture, motor skill, proprioception of scapular, scapulothoracic and UE control with self care, reaching, carrying, lifting, house/yardwork, driving/computer work      Manual Treatments:  PROM / STM / Oscillations-Mobs:  G-I, II, III, IV (PA's, Inf., Post.)  [] (89519) Provided manual therapy to mobilize soft tissue/joints of cervical/CT, scapular GHJ and UE for the purpose of modulating pain, promoting relaxation,  increasing ROM, reducing/eliminating soft tissue swelling/inflammation/restriction, improving soft tissue extensibility and allowing for proper ROM for normal function with self care, reaching, carrying, lifting, house/yardwork, driving/computer work    Charges:  Timed Code Treatment Minutes: 60   Total Treatment Minutes: 60       [] EVAL (LOW) 41654 (typically 20 minutes face-to-face)  [] EVAL (MOD) 17656 (typically 30 minutes face-to-face)  [] EVAL (HIGH) 94427 (typically 45 minutes face-to-face)  [] RE-EVAL     [x] IO(97721) x 2    [] Dry needle 1 or 2 Muscles (44536)  [x] NMR (33826) x  1   [] Dry needle 3+ Muscles (42823)  [x] Manual (72899) x 1    [] Ultrasound (89587) x  [] TA (96130) x     [] Mech Traction (09935)  [] ES(attended) (35657)     [] ES (un) (69510):   [] Vasopump (56245) [] Ionto (66272)   [] Other:    If [facility] Please Indicate Time In/Out  CPT Code Time in Time out   man 3:30 3:45   exs 2:45 3:15   nmr 3:15 3:30                    Approval Dates:  CPT Code Units Approved Units Used  Date Updated:                         ASSESSMENT:  See eval    Treatment/Activity Tolerance:  [x] Patient tolerated treatment well [] Patient limited by fatique  [] Patient limited by pain  [] Patient limited by other medical complications  [] Other:     Overall Progression Towards Functional goals/ Treatment Progress Update:  [] Patient is progressing as expected towards functional goals listed. [] Progression is slowed due to complexities/Impairments listed. [] Progression has been slowed due to co-morbidities.   [x] Plan just implemented, too soon to assess goals progression <30days   [] Goals require adjustment due to lack of progress  [] Patient is not progressing as expected and requires additional follow up with physician  [] Other    Prognosis for POC: [x] Good [] Fair  [] Poor    Patient requires continued skilled intervention: [x] Yes  [] No      PLAN: UE arom, aarom, prom, strengthening, scapular strength, myofascial release, joint mobs to gh, sc, ac, scapular thoracic, modalities for pain, hep. He has poor scap position and control. ROM is relatively good. Work on strength of all aspects of the shoulder and progress to heavy weights and activities to mimic work as a . Consider bfr and DN  4/29/22  Assess bfr, increase stab exs    [] Continue per plan of care [] Alter current plan (see comments)  [x] Plan of care initiated [] Hold pending MD visit [] Discharge    Electronically signed by: Chava Hathaway PTA     Note: If patient does not return for scheduled/recommended follow up visits, this note will serve as a discharge from care along with the most recent update on progress.

## 2022-05-09 ENCOUNTER — OFFICE VISIT (OUTPATIENT)
Dept: FAMILY MEDICINE CLINIC | Age: 22
End: 2022-05-09
Payer: COMMERCIAL

## 2022-05-09 VITALS
BODY MASS INDEX: 19.33 KG/M2 | HEIGHT: 65 IN | SYSTOLIC BLOOD PRESSURE: 110 MMHG | DIASTOLIC BLOOD PRESSURE: 72 MMHG | OXYGEN SATURATION: 97 % | WEIGHT: 116 LBS | HEART RATE: 102 BPM

## 2022-05-09 DIAGNOSIS — F33.1 MODERATE EPISODE OF RECURRENT MAJOR DEPRESSIVE DISORDER (HCC): Primary | ICD-10-CM

## 2022-05-09 PROCEDURE — 1036F TOBACCO NON-USER: CPT | Performed by: FAMILY MEDICINE

## 2022-05-09 PROCEDURE — G8427 DOCREV CUR MEDS BY ELIG CLIN: HCPCS | Performed by: FAMILY MEDICINE

## 2022-05-09 PROCEDURE — 99215 OFFICE O/P EST HI 40 MIN: CPT | Performed by: FAMILY MEDICINE

## 2022-05-09 PROCEDURE — G8420 CALC BMI NORM PARAMETERS: HCPCS | Performed by: FAMILY MEDICINE

## 2022-05-09 RX ORDER — MIRTAZAPINE 30 MG/1
30 TABLET, FILM COATED ORAL NIGHTLY
Qty: 90 TABLET | Refills: 1 | Status: SHIPPED | OUTPATIENT
Start: 2022-05-09 | End: 2022-06-07

## 2022-05-09 NOTE — PROGRESS NOTES
2022    Blood pressure 110/72, pulse 102, height 5' 5\" (1.651 m), weight 116 lb (52.6 kg), SpO2 97 %. Teo Granados (:  2000) is a 24 y.o. male, here for evaluation of the following medical concerns:    Chief Complaint   Patient presents with    Weight Loss     f/u for weight loss      Here for f/u on weight loss over past 6 months. Base weight 124 lbs 10/21. Work-up for weight loss was neg:      CT abd/pelvis neg (done due to concurrent anorexia, nausea, early satiety, abd cramping). No longer seeing fatty stools (did not get fecal fat test done)    He has not lost any weight in the past month. He thinks he got down to 113 lbs and now has increased some. He still feels like he has to force himself to eat. Started Remeron a month ago. He has not felt any effect beyond feeling 'really tired' sleeping 10-11 hrs a night now. He is not working, goes to bed very late- 2-3 am.     Says personal life is very stressful presently and is difficult to deal with. Comes to tears explaining how depressed he is. Recently had break up with GF. 6 wks ago. Still hard to bear. Not talking to a counselor. He 'doesn't like to talk unless I break'    He felt like this in high school. Never dx with depression, but this is how he felt. No SI plans, but has contemplated being dead both in and after HS.  (more thoughts of this IN HS). He admits to feelings of hopelessness, loss of interested in things he likes, feels he is not good at hs career as a , does not get respect because of being small in size (but is teased and it bothers him). He feels very alone. No really close friends he can talk to. (has friends but does not feel he can talk to them about this). Feels ashamed to be like 'this.'  Hard to concentrate at times; work performance has been less than stellar. ( I over think things and second guess myself). Wants to be physically stronger.     He is not working due to shoulder injury. Going to PT and is 'getting slowly better.'  Pans return to work in June hopefully, after seeing ortho back again. He did not have surgery. Patient Active Problem List   Diagnosis    Irritable bowel syndrome with diarrhea    Closed displaced fracture of middle third of navicular bone of right wrist        Body mass index is 19.3 kg/m². Wt Readings from Last 3 Encounters:   05/09/22 116 lb (52.6 kg)   04/11/22 116 lb (52.6 kg)   04/05/22 117 lb 9.6 oz (53.3 kg)       BP Readings from Last 3 Encounters:   05/09/22 110/72   04/11/22 104/72   03/01/22 110/70       No Known Allergies    Prior to Visit Medications    Medication Sig Taking? Authorizing Provider   mirtazapine (REMERON) 15 MG tablet Take 1 tablet by mouth nightly Yes Sheryl Clayton MD        Social History     Tobacco Use    Smoking status: Never Smoker    Smokeless tobacco: Never Used   Vaping Use    Vaping Use: Never used   Substance Use Topics    Alcohol use: No    Drug use: Not Currently       Review of Systems As above     Physical Exam  Constitutional:       General: He is not in acute distress. Appearance: Normal appearance. He is not ill-appearing. Comments: Thin without muscle wasting   HENT:      Head: Normocephalic and atraumatic. Pulmonary:      Effort: Pulmonary effort is normal.   Musculoskeletal:         General: Normal range of motion. Cervical back: Normal range of motion. Skin:     Findings: No rash. Neurological:      General: No focal deficit present. Mental Status: He is alert and oriented to person, place, and time. Mental status is at baseline. Psychiatric:         Thought Content: Thought content normal.         Judgment: Judgment normal.      Comments: Flat affect. Tearful at times. ASSESSMENT/PLAN:    1.  Moderate episode of recurrent major depressive disorder ([facility] [address]  - long discussion about the nature of depression, factors that affect it, goals for treatment, etc.  Likely the cause of his weight loss. Strongly recommend counseling AND medical treatment. - increase Remeron to 30 mg in support of mood, appetite. A higher dose may be less sedating also. - External Referral To Psychology    Call if mood declines or new symptoms arise. Total time spent on this encounter: 40 min      Return in about 4 weeks (around 6/6/2022) for follow up depression. An  Pediusignature was used to authenticate this note.     --Rubi Kinney MD on 5/9/2022 at 3:53 PM

## 2022-05-10 ENCOUNTER — HOSPITAL ENCOUNTER (OUTPATIENT)
Dept: PHYSICAL THERAPY | Age: 22
Setting detail: THERAPIES SERIES
Discharge: HOME | End: 2022-05-10
Payer: COMMERCIAL

## 2022-05-10 PROCEDURE — 97140 MANUAL THERAPY 1/> REGIONS: CPT

## 2022-05-10 PROCEDURE — 97110 THERAPEUTIC EXERCISES: CPT

## 2022-05-10 PROCEDURE — 97112 NEUROMUSCULAR REEDUCATION: CPT

## 2022-05-10 NOTE — FLOWSHEET NOTE
[address] [facility] [place]. [address] [address]  Phone: [phone]   Fax:     [phone]        Physical Therapy Treatment Note/ Progress Report:       Date:  5/10/2022    Patient Name:  Mae Garcia    :  2000  MRN: [id number]    Pertinent Medical History:Additional Pertinent Hx: asthma, adhd, ibs, right wrist fracture    Medical/Treatment Diagnosis Information:  · Diagnosis: S43.401A (ICD-10-CM) - Sprain of right shoulder, unspecified shoulder sprain type, initial encounter  · Treatment Diagnosis: decreased abilty to use right ue and work    Insurance/Certification information:  PT Insurance Information: [employer] [facility]  Physician Information:  Referring Provider (secondary): Dr. Bernabe Agee of care signed (Y/N): routed  Imaging   Right Shoulder X-Ray: 3 view x-rays of the shoulder including AP, scapular Y, and axillary obtained and reviewed  [facility] Joint: no abnormalities noted  Glenohumeral joint: no abnormalities noted  Elevation humeral head: absent     Right Shoulder MRI 3/24/22: I independently reviewed the images, as well as the radiology report. . Bone contusion at the posterolateral humeral head.  No obvious osseous   Bankart deformity. 2. No partial or full thickness rotator cuff tear.  Mild supraspinatus and   infraspinatus tendinosis. 3. Tearing of the inferior and anterior inferior labrum.  Mild underlying   diffuse labral degeneration.  2 mm anterior paralabral cyst.   4. Mild glenohumeral chondromalacia.    5. Mild degenerative change of the right AC joint.        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 He c/o constant pain which is sharp with repetitive movements or attempted lifting. He also can';t reach behind his back or work overhead. He has a humeral head bruise and 2 small labral tears. 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 SUBJECTIVE: Pt states, \" I can't use my arm like I need to \"  4/29/22  Pt states ,\" just stiff from exercising \"  05/03/22 Patient reports slight soreness when reaching behind the back and to the side. States he still gets some popping in his shoulder. 05/05/22 Patient reports shoulder is feeling a little stronger,still getting some popping and soreness.   5/10/22  Pt states, \"     OBJECTIVE:      ROM Left Right   Shoulder Flex wfl all 170   Shoulder Abd   120   Shoulder ER   60   Shoulder IR   L4                   Strength  Left Right   Shoulder Flex 5/5 4-   Shoulder Scap   4-   Shoulder ER   4-   Shoulder IR   4-                  RESTRICTIONS/PRECAUTIONS:     Exercises/Interventions:   Therapeutic Ex (68524)  Min: Resistance/Reps Cues/Notes   ube L 3 X 4 min    isos thru range X 3 min    prom All ranges    Rhythmic stab Er and 90 deg x 3 min total    Prone scap add with ext X 2 min    Sl er 2 # 2 x 10    tband horiz add     scap retract     All 4's serratus X 2 min                        Manual Intervention  (43951)  Min:15 Prom, mfr to entire shoulder and scap, traps , levator,               NMR re-education (13405)  Min:     walkouts Blue x 2 min ea side    Body blade X 2 min ea    Ball on wall X 3 min 1 min rhytmic stab with green tb    Er wall slide Red x 2 min    Standing serratus punch Blue x 30 lawnmower 10# x 30    fencing Green x 2 min                                  Therapeutic Activity (77334)  Min: Discussed mechanism of injury, anatomy, physiology, biomechanics, sleeping positions,  and strategies to accelerate the healing process. Answered all of patients questions regarding injury. Gave necessary information to get any equipment needed. Modalities:  Min                 Smart Cuff Pro Smart Phase Protocol       Spoke with Dr. Leonela Parsons  regarding the use of BFR with patient. BFR Session 3             Smart Cuff Pro Personalized Tourniquet System for BFR     LE: up to 80% LOP       UE: up to 50% LOP  4/29/22 5/10     BFR Location: biceps  BFR set at: 73 mmHg       Total Time under Tourniquet :  8      Protocol: 30/15/15/15       Exercises:   Reps 30 sec Notes    Biceps curls  4# x s        # of reps required  30 Rest     completed  30 30    tband er green reps required  15 Rest     completed  15 30    tband ir green reps required  15 Rest     completed  15 30 Had to turn pressure down 10 due to tingling during 3rd exs   Triceps curl green reps required  15 Rest     completed  15 30                Other Therapeutic Activities:  Pt was educated on PT POC, Diagnosis, Prognosis, pathomechanics as well as frequency and duration of scheduling future physical therapy appointments. Time was also taken on this day to answer all patient questions and participation in PT. Reviewed appointment policy in detail with patient and patient verbalized understanding. Home Exercise Program:Patient demonstrated proper technique, good tolerance,  and was given written instructions for the above exercises  Access Code: [facility]  URL: [email or web address]  Date: 04/27/2022  Prepared by: Tegan Miller    Exercises  W - 1 x daily - 7 x weekly - 3 sets - 10 reps  Supine Scapular Protraction in Flexion with Dumbbells - 1 x daily - 7 x weekly - 3 sets - 10 reps  Wall Push Up with Plus - 1 x daily - 7 x weekly - 3 sets - 10 reps  Shoulder Internal Rotation with Resistance - 1 x daily - 7 x weekly - 3 sets - 10 reps    Shoulder extension with resistance - Neutral - 1 x daily - 7 x weekly - 3 sets - 10 reps    Therapeutic Exercise and NMR EXR                                                                           Hep 4/29/22  Serratus exs with tband,     ER Wall walk with red band  [] (36472) Provided verbal/tactile cueing for activities related to strengthening, flexibility, endurance, ROM  for improvements in scapular, scapulothoracic and UE control with self care, reaching, carrying, lifting, house/yardwork, driving/computer work.    [] (29249) Provided verbal/tactile cueing for activities related to improving balance, coordination, kinesthetic sense, posture, motor skill, proprioception  to assist with  scapular, scapulothoracic and UE control with self care, reaching, carrying, lifting, house/yardwork, driving/computer work. Therapeutic Activities:    [] (03396 or 65363) Provided verbal/tactile cueing for activities related to improving balance, coordination, kinesthetic sense, posture, motor skill, proprioception and motor activation to allow for proper function of scapular, scapulothoracic and UE control with self care, carrying, lifting, driving/computer work.      Home Exercise Program:    [x] (55392) Reviewed/Progressed HEP activities related to strengthening, flexibility, endurance, ROM of scapular, scapulothoracic and UE control with self care, reaching, carrying, lifting, house/yardwork, driving/computer work  [] (40430) Reviewed/Progressed HEP activities related to improving balance, coordination, kinesthetic sense, posture, motor skill, proprioception of scapular, scapulothoracic and UE control with self care, reaching, carrying, lifting, house/yardwork, driving/computer work      Manual Treatments:  PROM / STM / Oscillations-Mobs:  G-I, II, III, IV (PA's, Inf., Post.)  [] (12641) Provided manual therapy to mobilize soft tissue/joints of cervical/CT, scapular GHJ and UE for the purpose of modulating pain, promoting relaxation,  increasing ROM, reducing/eliminating soft tissue swelling/inflammation/restriction, improving soft tissue extensibility and allowing for proper ROM for normal function with self care, reaching, carrying, lifting, house/yardwork, driving/computer work    Charges:  Timed Code Treatment Minutes: 60   Total Treatment Minutes: 60       [] EVAL (LOW) 71237 (typically 20 minutes face-to-face)  [] EVAL (MOD) 56587 (typically 30 minutes face-to-face)  [] EVAL (HIGH) 40197 (typically 45 minutes face-to-face)  [] RE-EVAL     [x] CO(55950) x 2    [] Dry needle 1 or 2 Muscles (46454)  [x] NMR (15709) x  1   [] Dry needle 3+ Muscles (95229)  [x] Manual (67391) x 1    [] Ultrasound (75645) x  [] TA (29044) x     [] Mech Traction (96001)  [] ES(attended) (20101)     [] ES (un) (81270):   [] Vasopump (84595) [] Ionto (09007)   [] Other:    If [facility] Please Indicate Time In/Out  CPT Code Time in Time out   man 200 215   exs 215 245   nmr 245 300                    Approval Dates:  CPT Code Units Approved Units Used  Date Updated:                         ASSESSMENT:  See eval    Treatment/Activity Tolerance:  [x] Patient tolerated treatment well [] Patient limited by fatique  [] Patient limited by pain  [] Patient limited by other medical complications  [] Other:     Overall Progression Towards Functional goals/ Treatment Progress Update:  [] Patient is progressing as expected towards functional goals listed. [] Progression is slowed due to complexities/Impairments listed. [] Progression has been slowed due to co-morbidities.   [x] Plan just implemented, too soon to assess goals progression <30days   [] Goals require adjustment due to lack of progress  [] Patient is not progressing as expected and requires additional follow up with physician  [] Other    Prognosis for POC: [x] Good [] Fair  [] Poor    Patient requires continued skilled intervention: [x] Yes  [] No      PLAN: UE arom, aarom, prom, strengthening, scapular strength, myofascial release, joint mobs to gh, sc, ac, scapular thoracic, modalities for pain, hep. He has poor scap position and control. ROM is relatively good. Work on strength of all aspects of the shoulder and progress to heavy weights and activities to mimic work as a . Consider bfr and DN  4/29/22  Assess bfr, increase stab exs    [] Continue per plan of care [] Alter current plan (see comments)  [x] Plan of care initiated [] Hold pending MD visit [] Discharge    Electronically signed by: Alethea Georges PT     Note: If patient does not return for scheduled/recommended follow up visits, this note will serve as a discharge from care along with the most recent update on progress.

## 2022-05-13 ENCOUNTER — HOSPITAL ENCOUNTER (OUTPATIENT)
Dept: PHYSICAL THERAPY | Age: 22
Setting detail: THERAPIES SERIES
Discharge: HOME | End: 2022-05-13
Payer: COMMERCIAL

## 2022-05-13 PROCEDURE — 97112 NEUROMUSCULAR REEDUCATION: CPT

## 2022-05-13 PROCEDURE — 97140 MANUAL THERAPY 1/> REGIONS: CPT

## 2022-05-13 PROCEDURE — 97110 THERAPEUTIC EXERCISES: CPT

## 2022-05-13 NOTE — FLOWSHEET NOTE
[address] [facility]. [place] [address]  Phone: [phone]   Fax:     [phone]        Physical Therapy Treatment Note/ Progress Report:       Date:  2022    Patient Name:  Prudence Simmons    :  2000  MRN: [id number]    Pertinent Medical History:Additional Pertinent Hx: asthma, adhd, ibs, right wrist fracture    Medical/Treatment Diagnosis Information:  · Diagnosis: S43.401A (ICD-10-CM) - Sprain of right shoulder, unspecified shoulder sprain type, initial encounter  · Treatment Diagnosis: decreased abilty to use right ue and work    Insurance/Certification information:  PT Insurance Information: [employer] [facility]  Physician Information:  Referring Provider (secondary): Dr. Treva Hester of care signed (Y/N): routed  Imaging   Right Shoulder X-Ray: 3 view x-rays of the shoulder including AP, scapular Y, and axillary obtained and reviewed  [facility] Joint: no abnormalities noted  Glenohumeral joint: no abnormalities noted  Elevation humeral head: absent     Right Shoulder MRI 3/24/22: I independently reviewed the images, as well as the radiology report. . Bone contusion at the posterolateral humeral head.  No obvious osseous   Bankart deformity. 2. No partial or full thickness rotator cuff tear.  Mild supraspinatus and   infraspinatus tendinosis. 3. Tearing of the inferior and anterior inferior labrum.  Mild underlying   diffuse labral degeneration.  2 mm anterior paralabral cyst.   4. Mild glenohumeral chondromalacia.    5. Mild degenerative change of the right AC joint.        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 He c/o constant pain which is sharp with repetitive movements or attempted lifting. He also can';t reach behind his back or work overhead. He has a humeral head bruise and 2 small labral tears. 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 SUBJECTIVE: Pt states, \" I can't use my arm like I need to \"  4/29/22  Pt states ,\" just stiff from exercising \"  05/03/22 Patient reports slight soreness when reaching behind the back and to the side. States he still gets some popping in his shoulder. 05/05/22 Patient reports shoulder is feeling a little stronger,still getting some popping and soreness. 5/10/22  Pt states, \"   05/13/22 Patient reports shoulder feels a little stronger,still getting some popping and soreness.     OBJECTIVE:      ROM Left Right   Shoulder Flex wfl all 170   Shoulder Abd   120   Shoulder ER   60   Shoulder IR   L4                   Strength  Left Right   Shoulder Flex 5/5 4-   Shoulder Scap   4-   Shoulder ER   4-   Shoulder IR   4-                  RESTRICTIONS/PRECAUTIONS:     Exercises/Interventions:   Therapeutic Ex (69815)  Min: Resistance/Reps Cues/Notes   ube L 3 X 4 min    isos thru range X 3 min    prom All ranges    Rhythmic stab Er and 90 deg x 3 min total    Prone scap add with ext X 2 min with 2 #    Sl er 2 # 2 x 10    tband horiz add     scap retract     All 4's serratus X 2 min                        Manual Intervention  (81287)  Min:15 Prom, mfr to entire shoulder and scap, traps , levator,               NMR re-education (34047)  Min:     walkouts Blue x 2 min ea side    Body blade X 2 min ea    Ball on wall X 3 min 1 min rhytmic stab with green tb    Er wall slide Red x 2 min    Standing serratus punch Blue x 30    lawnmower 10# x 30    fencing Green x 2 min                                  Therapeutic Activity (93058)  Min: Discussed mechanism of injury, anatomy, physiology, biomechanics, sleeping positions,  and strategies to accelerate the healing process. Answered all of patients questions regarding injury. Gave necessary information to get any equipment needed. Modalities:  Min                 Smart Cuff Pro Smart Phase Protocol       Spoke with Dr. Patricio Zuniga  regarding the use of BFR with patient. BFR Session 3             Smart Cuff Pro Personalized Tourniquet System for BFR     LE: up to 80% LOP       UE: up to 50% LOP  4/29/22 5/10     BFR Location: biceps  BFR set at: 73 mmHg       Total Time under Tourniquet :  8      Protocol: 30/15/15/15       Exercises:   Reps 30 sec Notes    Biceps curls  4# x s        # of reps required  30 Rest     completed  30 30    tband er green reps required  15 Rest     completed  15 30    tband ir green reps required  15 Rest     completed  15 30 Had to turn pressure down 10 due to tingling during 3rd exs   Triceps curl green reps required  15 Rest     completed  15 30                Other Therapeutic Activities:  Pt was educated on PT POC, Diagnosis, Prognosis, pathomechanics as well as frequency and duration of scheduling future physical therapy appointments. Time was also taken on this day to answer all patient questions and participation in PT. Reviewed appointment policy in detail with patient and patient verbalized understanding. Home Exercise Program:Patient demonstrated proper technique, good tolerance,  and was given written instructions for the above exercises  Access Code: [facility]  URL: [email or web address]  Date: 04/27/2022  Prepared by: Tony Peters    Exercises  W - 1 x daily - 7 x weekly - 3 sets - 10 reps  Supine Scapular Protraction in Flexion with Dumbbells - 1 x daily - 7 x weekly - 3 sets - 10 reps  Wall Push Up with Plus - 1 x daily - 7 x weekly - 3 sets - 10 reps  Shoulder Internal Rotation with Resistance - 1 x daily - 7 x weekly - 3 sets - 10 reps    Shoulder extension with resistance - Neutral - 1 x daily - 7 x weekly - 3 sets - 10 reps    Therapeutic Exercise and NMR EXR                                                                           Hep 4/29/22  Serratus exs with tband,     ER Wall walk with red band  [] (16744) Provided verbal/tactile cueing for activities related to strengthening, flexibility, endurance, ROM  for improvements in scapular, scapulothoracic and UE control with self care, reaching, carrying, lifting, house/yardwork, driving/computer work.    [] (61172) Provided verbal/tactile cueing for activities related to improving balance, coordination, kinesthetic sense, posture, motor skill, proprioception  to assist with  scapular, scapulothoracic and UE control with self care, reaching, carrying, lifting, house/yardwork, driving/computer work. Therapeutic Activities:    [] (43819 or 39420) Provided verbal/tactile cueing for activities related to improving balance, coordination, kinesthetic sense, posture, motor skill, proprioception and motor activation to allow for proper function of scapular, scapulothoracic and UE control with self care, carrying, lifting, driving/computer work.      Home Exercise Program:    [x] (93612) Reviewed/Progressed HEP activities related to strengthening, flexibility, endurance, ROM of scapular, scapulothoracic and UE control with self care, reaching, carrying, lifting, house/yardwork, driving/computer work  [] (71284) Reviewed/Progressed HEP activities related to improving balance, coordination, kinesthetic sense, posture, motor skill, proprioception of scapular, scapulothoracic and UE control with self care, reaching, carrying, lifting, house/yardwork, driving/computer work      Manual Treatments:  PROM / STM / Oscillations-Mobs:  G-I, II, III, IV (PA's, Inf., Post.)  [] (20021) Provided manual therapy to mobilize soft tissue/joints of cervical/CT, scapular GHJ and UE for the purpose of modulating pain, promoting relaxation,  increasing ROM, reducing/eliminating soft tissue swelling/inflammation/restriction, improving soft tissue extensibility and allowing for proper ROM for normal function with self care, reaching, carrying, lifting, house/yardwork, driving/computer work    Charges:  Timed Code Treatment Minutes: 60   Total Treatment Minutes: 60       [] EVAL (LOW) 76349 (typically 20 minutes face-to-face)  [] EVAL (MOD) 62827 (typically 30 minutes face-to-face)  [] EVAL (HIGH) 71615 (typically 45 minutes face-to-face)  [] RE-EVAL     [x] QP(99573) x 2    [] Dry needle 1 or 2 Muscles (83661)  [x] NMR (14558) x  1   [] Dry needle 3+ Muscles (44778)  [x] Manual (04344) x 1    [] Ultrasound (21529) x  [] TA (59520) x     [] Mech Traction (27306)  [] ES(attended) (83198)     [] ES (un) (92918):   [] Vasopump (01114) [] Ionto (00596)   [] Other:    If [facility] Please Indicate Time In/Out  CPT Code Time in Time out   man 200 215   exs 2:15 245   nmr 245 300                    Approval Dates:  CPT Code Units Approved Units Used  Date Updated:                         ASSESSMENT:  See eval    Treatment/Activity Tolerance:  [x] Patient tolerated treatment well [] Patient limited by fatique  [] Patient limited by pain  [] Patient limited by other medical complications  [] Other:     Overall Progression Towards Functional goals/ Treatment Progress Update:  [] Patient is progressing as expected towards functional goals listed. [] Progression is slowed due to complexities/Impairments listed. [] Progression has been slowed due to co-morbidities.   [x] Plan just implemented, too soon to assess goals progression <30days   [] Goals require adjustment due to lack of progress  [] Patient is not progressing as expected and requires additional follow up with physician  [] Other    Prognosis for POC: [x] Good [] Fair  [] Poor    Patient requires continued skilled intervention: [x] Yes  [] No      PLAN: UE arom, aarom, prom, strengthening, scapular strength, myofascial release, joint mobs to gh, sc, ac, scapular thoracic, modalities for pain, hep. He has poor scap position and control. ROM is relatively good. Work on strength of all aspects of the shoulder and progress to heavy weights and activities to mimic work as a . Consider bfr and DN  4/29/22  Assess bfr, increase stab exs    [] Continue per plan of care [] Alter current plan (see comments)  [x] Plan of care initiated [] Hold pending MD visit [] Discharge    Electronically signed by: Gillian Manzo PTA     Note: If patient does not return for scheduled/recommended follow up visits, this note will serve as a discharge from care along with the most recent update on progress.

## 2022-05-17 ENCOUNTER — HOSPITAL ENCOUNTER (OUTPATIENT)
Dept: PHYSICAL THERAPY | Age: 22
Setting detail: THERAPIES SERIES
Discharge: HOME | End: 2022-05-17
Payer: COMMERCIAL

## 2022-05-17 PROCEDURE — 97140 MANUAL THERAPY 1/> REGIONS: CPT

## 2022-05-17 PROCEDURE — 97112 NEUROMUSCULAR REEDUCATION: CPT

## 2022-05-17 PROCEDURE — 97110 THERAPEUTIC EXERCISES: CPT

## 2022-05-17 NOTE — FLOWSHEET NOTE
[address] [facility] [address] [place] [address]  Phone: [phone]   Fax:     [phone]        Physical Therapy Treatment Note/ Progress Report:       Date:  2022    Patient Name:  Ulices Alexis    :  2000  MRN: [id number]    Pertinent Medical History:Additional Pertinent Hx: asthma, adhd, ibs, right wrist fracture    Medical/Treatment Diagnosis Information:  · Diagnosis: S43.401A (ICD-10-CM) - Sprain of right shoulder, unspecified shoulder sprain type, initial encounter  · Treatment Diagnosis: decreased abilty to use right ue and work    Insurance/Certification information:  PT Insurance Information: [facility]  Physician Information:  Referring Provider (secondary): Dr. Srinivas Napier of care signed (Y/N): routed  Imaging   Right Shoulder X-Ray: 3 view x-rays of the shoulder including AP, scapular Y, and axillary obtained and reviewed  [facility] Joint: no abnormalities noted  Glenohumeral joint: no abnormalities noted  Elevation humeral head: absent     Right Shoulder MRI 3/24/22: I independently reviewed the images, as well as the radiology report. . Bone contusion at the posterolateral humeral head.  No obvious osseous   Bankart deformity. 2. No partial or full thickness rotator cuff tear.  Mild supraspinatus and   infraspinatus tendinosis. 3. Tearing of the inferior and anterior inferior labrum.  Mild underlying   diffuse labral degeneration.  2 mm anterior paralabral cyst.   4. Mild glenohumeral chondromalacia.    5. Mild degenerative change of the right AC joint.        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 He c/o constant pain which is sharp with repetitive movements or attempted lifting. He also can';t reach behind his back or work overhead. He has a humeral head bruise and 2 small labral tears. 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 SUBJECTIVE: Pt states, \" I can't use my arm like I need to \"  4/29/22  Pt states ,\" just stiff from exercising \"  05/03/22 Patient reports slight soreness when reaching behind the back and to the side. States he still gets some popping in his shoulder. 05/05/22 Patient reports shoulder is feeling a little stronger,still getting some popping and soreness. 5/10/22  Pt states, \"   05/13/22 Patient reports shoulder feels a little stronger,still getting some popping and soreness. 05/17/22 Patient reports some soreness in his shoulder after playing amita golf over the weekend.     OBJECTIVE:      ROM Left Right   Shoulder Flex wfl all 170   Shoulder Abd   120   Shoulder ER   60   Shoulder IR   L4                   Strength  Left Right   Shoulder Flex 5/5 4-   Shoulder Scap   4-   Shoulder ER   4-   Shoulder IR   4-                  RESTRICTIONS/PRECAUTIONS:     Exercises/Interventions:   Therapeutic Ex (65351)  Min: Resistance/Reps Cues/Notes   ube L 3 X 4 min    isos thru range X 3 min    prom All ranges    Rhythmic stab Er and 90 deg x 3 min total    Prone scap add with ext X 2 min with 2 #    Sl er 2 # 2 x 10    tband horiz add     scap retract     All 4's serratus X 2 min                        Manual Intervention  (90081)  Min:15 Prom, mfr to entire shoulder and scap, traps , levator,               NMR re-education (29901)  Min:     walkouts Blue x 2 min ea side    Body blade X 2 min ea    Ball on wall X 3 min 1 min rhytmic stab with green tb    Er wall slide Red x 2 min    Standing serratus punch Blue x 30    lawnmower 10# x 30    fencing Green x 2 min                                  Therapeutic Activity (69901)  Min: Discussed mechanism of injury, anatomy, physiology, biomechanics, sleeping positions,  and strategies to accelerate the healing process. Answered all of patients questions regarding injury. Gave necessary information to get any equipment needed. Modalities:  Min                 Smart Cuff Pro Smart Phase Protocol       Spoke with Dr. Paige Martinez  regarding the use of BFR with patient. BFR Session 3             Smart Cuff Pro Personalized Tourniquet System for BFR     LE: up to 80% LOP       UE: up to 50% LOP  4/29/22 5/10     BFR Location: biceps  BFR set at: 73 mmHg       Total Time under Tourniquet :  8      Protocol: 30/15/15/15       Exercises:   Reps 30 sec Notes    Biceps curls  5# x s        # of reps required  30 Rest     completed  30 30    tband er green reps required  15 Rest     completed  15 30    tband ir green reps required  15 Rest     completed  15 30 Had to turn pressure down 10 due to tingling during 3rd exs   Triceps curl green reps required  15 Rest     completed  15 30                Other Therapeutic Activities:  Pt was educated on PT POC, Diagnosis, Prognosis, pathomechanics as well as frequency and duration of scheduling future physical therapy appointments. Time was also taken on this day to answer all patient questions and participation in PT. Reviewed appointment policy in detail with patient and patient verbalized understanding. Home Exercise Program:Patient demonstrated proper technique, good tolerance,  and was given written instructions for the above exercises  Access Code: [facility]  URL: [email or web address]  Date: 04/27/2022  Prepared by: Marco Alas    Exercises  W - 1 x daily - 7 x weekly - 3 sets - 10 reps  Supine Scapular Protraction in Flexion with Dumbbells - 1 x daily - 7 x weekly - 3 sets - 10 reps  Wall Push Up with Plus - 1 x daily - 7 x weekly - 3 sets - 10 reps  Shoulder Internal Rotation with Resistance - 1 x daily - 7 x weekly - 3 sets - 10 reps    Shoulder extension with resistance - Neutral - 1 x daily - 7 x weekly - 3 sets - 10 reps    Therapeutic Exercise and NMR EXR                                                                           Hep 4/29/22  Serratus exs with tband,     ER Wall walk with red band  [] (84151) Provided verbal/tactile cueing for activities related to strengthening, flexibility, endurance, ROM  for improvements in scapular, scapulothoracic and UE control with self care, reaching, carrying, lifting, house/yardwork, driving/computer work.    [] (73874) Provided verbal/tactile cueing for activities related to improving balance, coordination, kinesthetic sense, posture, motor skill, proprioception  to assist with  scapular, scapulothoracic and UE control with self care, reaching, carrying, lifting, house/yardwork, driving/computer work. Therapeutic Activities:    [] (25447 or 73109) Provided verbal/tactile cueing for activities related to improving balance, coordination, kinesthetic sense, posture, motor skill, proprioception and motor activation to allow for proper function of scapular, scapulothoracic and UE control with self care, carrying, lifting, driving/computer work.      Home Exercise Program:    [x] (12240) Reviewed/Progressed HEP activities related to strengthening, flexibility, endurance, ROM of scapular, scapulothoracic and UE control with self care, reaching, carrying, lifting, house/yardwork, driving/computer work  [] (25021) Reviewed/Progressed HEP activities related to improving balance, coordination, kinesthetic sense, posture, motor skill, proprioception of scapular, scapulothoracic and UE control with self care, reaching, carrying, lifting, house/yardwork, driving/computer work      Manual Treatments:  PROM / STM / Oscillations-Mobs:  G-I, II, III, IV (Tyler, Inf., Post.)  [] (47012) Provided manual therapy to mobilize soft tissue/joints of cervical/CT, scapular GHJ and UE for the purpose of modulating pain, promoting relaxation,  increasing ROM, reducing/eliminating soft tissue swelling/inflammation/restriction, improving soft tissue extensibility and allowing for proper ROM for normal function with self care, reaching, carrying, lifting, house/yardwork, driving/computer work    Charges:  Timed Code Treatment Minutes: 60   Total Treatment Minutes: 60       [] EVAL (LOW) 61569 (typically 20 minutes face-to-face)  [] EVAL (MOD) 53254 (typically 30 minutes face-to-face)  [] EVAL (HIGH) 18678 (typically 45 minutes face-to-face)  [] RE-EVAL     [x] LF(02585) x 2    [] Dry needle 1 or 2 Muscles (38321)  [x] NMR (51288) x  1   [] Dry needle 3+ Muscles (74319)  [x] Manual (41619) x 1    [] Ultrasound (68654) x  [] TA (36883) x     [] Mech Traction (43341)  [] ES(attended) (75989)     [] ES (un) (98587):   [] Vasopump (36222) [] Ionto (12085)   [] Other:    If [facility] Please Indicate Time In/Out  CPT Code Time in Time out   man 2:45 3:00   exs 3:00 3:30   nmr 3:30 3:45                    Approval Dates:  CPT Code Units Approved Units Used  Date Updated:                         ASSESSMENT:  See eval    Treatment/Activity Tolerance:  [x] Patient tolerated treatment well [] Patient limited by fatique  [] Patient limited by pain  [] Patient limited by other medical complications  [] Other:     Overall Progression Towards Functional goals/ Treatment Progress Update:  [] Patient is progressing as expected towards functional goals listed. [] Progression is slowed due to complexities/Impairments listed. [] Progression has been slowed due to co-morbidities.   [x] Plan just implemented, too soon to assess goals progression <30days   [] Goals require adjustment due to lack of progress  [] Patient is not progressing as expected and requires additional follow up with physician  [] Other    Prognosis for POC: [x] Good [] Fair  [] Poor    Patient requires continued skilled intervention: [x] Yes  [] No      PLAN: UE arom, aarom, prom, strengthening, scapular strength, myofascial release, joint mobs to gh, sc, ac, scapular thoracic, modalities for pain, hep. He has poor scap position and control. ROM is relatively good. Work on strength of all aspects of the shoulder and progress to heavy weights and activities to mimic work as a . Consider bfr and DN  4/29/22  Assess bfr, increase stab exs    [] Continue per plan of care [] Alter current plan (see comments)  [x] Plan of care initiated [] Hold pending MD visit [] Discharge    Electronically signed by: Ad Lozano PTA     Note: If patient does not return for scheduled/recommended follow up visits, this note will serve as a discharge from care along with the most recent update on progress.

## 2022-05-19 ENCOUNTER — HOSPITAL ENCOUNTER (OUTPATIENT)
Dept: PHYSICAL THERAPY | Age: 22
Setting detail: THERAPIES SERIES
Discharge: HOME | End: 2022-05-19
Payer: COMMERCIAL

## 2022-05-19 PROCEDURE — 97140 MANUAL THERAPY 1/> REGIONS: CPT

## 2022-05-19 PROCEDURE — 97110 THERAPEUTIC EXERCISES: CPT

## 2022-05-19 PROCEDURE — 97112 NEUROMUSCULAR REEDUCATION: CPT

## 2022-05-19 NOTE — FLOWSHEET NOTE
[address] [facility] [address] [place] [address]  Phone: [phone]   Fax:     [phone]        Physical Therapy Treatment Note/ Progress Report:       Date:  2022    Patient Name:  Natasha Arita    :  2000  MRN: [id number]    Pertinent Medical History:Additional Pertinent Hx: asthma, adhd, ibs, right wrist fracture    Medical/Treatment Diagnosis Information:  · Diagnosis: S43.401A (ICD-10-CM) - Sprain of right shoulder, unspecified shoulder sprain type, initial encounter  · Treatment Diagnosis: decreased abilty to use right ue and work    Insurance/Certification information:  PT Insurance Information: [employer] [facility]  Physician Information:  Referring Provider (secondary): Dr. Bina Senior of care signed (Y/N): routed  Imaging   Right Shoulder X-Ray: 3 view x-rays of the shoulder including AP, scapular Y, and axillary obtained and reviewed  [facility] Joint: no abnormalities noted  Glenohumeral joint: no abnormalities noted  Elevation humeral head: absent     Right Shoulder MRI 3/24/22: I independently reviewed the images, as well as the radiology report. . Bone contusion at the posterolateral humeral head.  No obvious osseous   Bankart deformity. 2. No partial or full thickness rotator cuff tear.  Mild supraspinatus and   infraspinatus tendinosis. 3. Tearing of the inferior and anterior inferior labrum.  Mild underlying   diffuse labral degeneration.  2 mm anterior paralabral cyst.   4. Mild glenohumeral chondromalacia.    5. Mild degenerative change of the right AC joint.        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 He c/o constant pain which is sharp with repetitive movements or attempted lifting. He also can';t reach behind his back or work overhead. He has a humeral head bruise and 2 small labral tears. 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 SUBJECTIVE: Pt states, \" I can't use my arm like I need to \"  4/29/22  Pt states ,\" just stiff from exercising \"  05/03/22 Patient reports slight soreness when reaching behind the back and to the side. States he still gets some popping in his shoulder. 05/05/22 Patient reports shoulder is feeling a little stronger,still getting some popping and soreness. 5/10/22  Pt states, \"   05/13/22 Patient reports shoulder feels a little stronger,still getting some popping and soreness. 05/17/22 Patient reports some soreness in his shoulder after playing amita golf over the weekend.   5/19/22  Pt states, \" feel stronger but still having pain,  I tried to do a pull up and     OBJECTIVE:      ROM Left Right   Shoulder Flex wfl all 170   Shoulder Abd   120   Shoulder ER   60   Shoulder IR   L4                   Strength  Left Right   Shoulder Flex 5/5 4-   Shoulder Scap   4-   Shoulder ER   4-   Shoulder IR   4-                  RESTRICTIONS/PRECAUTIONS:     Exercises/Interventions:   Therapeutic Ex (72736)  Min: Resistance/Reps Cues/Notes   ube L 3 X 4 min    isos thru range X 3 min    prom All ranges    Rhythmic stab Er and 90 deg x 3 min total    Prone scap add with ext X 2 min with 2 #    Sl er 2 # 2 x 10    tband horiz add     scap retract     All 4's horiz abd 3# x 2 min alt    Lat pull Black x 30 Manual Intervention  (71467)  Min:15 Prom, mfr to entire shoulder and scap, traps , levator, very tight in levator, infra, teres, sub scap very tight. NMR re-education (00724)  Min:     walkouts Blue x 2 min ea side    Body blade   2# X 2 min ea    resume   Er wall slide Red x 2 min       lawnmower 10# x 30    fencing Green x 2 min    trx push up X 30    trx pull up X 30    bosu push up     jfish all 4's horiz abd               Therapeutic Activity (70934)  Min: Discussed mechanism of injury, anatomy, physiology, biomechanics, sleeping positions,  and strategies to accelerate the healing process. Answered all of patients questions regarding injury. Gave necessary information to get any equipment needed. Modalities:  Min                 Smart Cuff Pro Smart Phase Protocol       Spoke with Dr. Brian Seo  regarding the use of BFR with patient. BFR Session 3             Smart Cuff Pro Personalized Tourniquet System for BFR     LE: up to 80% LOP       UE: up to 50% LOP  4/29/22 5/10     BFR Location: biceps  BFR set at: 73 mmHg       Total Time under Tourniquet :  8      Protocol: 30/15/15/15       Exercises:   Reps 30 sec Notes    Biceps curls  5# x s        # of reps required  30 Rest     completed  30 30    W green  reps required  15 Rest     completed  15 30    tband ir green reps required  15 Rest     completed  15 30    Lawnmower  reps required  15 Rest     completed  15 30                Other Therapeutic Activities:  Pt was educated on PT POC, Diagnosis, Prognosis, pathomechanics as well as frequency and duration of scheduling future physical therapy appointments. Time was also taken on this day to answer all patient questions and participation in PT. Reviewed appointment policy in detail with patient and patient verbalized understanding.      Home Exercise Program:Patient demonstrated proper technique, good tolerance,  and was given written instructions for the above exercises  Access Code: Paige Ríos  URL: [email or web address]  Date: 04/27/2022  Prepared by: Stephen Cart    Exercises  W - 1 x daily - 7 x weekly - 3 sets - 10 reps  Supine Scapular Protraction in Flexion with Dumbbells - 1 x daily - 7 x weekly - 3 sets - 10 reps  Wall Push Up with Plus - 1 x daily - 7 x weekly - 3 sets - 10 reps  Shoulder Internal Rotation with Resistance - 1 x daily - 7 x weekly - 3 sets - 10 reps    Shoulder extension with resistance - Neutral - 1 x daily - 7 x weekly - 3 sets - 10 reps  Gave tband lat pull for home     Therapeutic Exercise and NMR EXR                                                                           Hep 4/29/22  Serratus exs with tband,     ER Wall walk with red band  [] (91186) Provided verbal/tactile cueing for activities related to strengthening, flexibility, endurance, ROM  for improvements in scapular, scapulothoracic and UE control with self care, reaching, carrying, lifting, house/yardwork, driving/computer work.    [] (28842) Provided verbal/tactile cueing for activities related to improving balance, coordination, kinesthetic sense, posture, motor skill, proprioception  to assist with  scapular, scapulothoracic and UE control with self care, reaching, carrying, lifting, house/yardwork, driving/computer work. Therapeutic Activities:    [] (73148 or 10480) Provided verbal/tactile cueing for activities related to improving balance, coordination, kinesthetic sense, posture, motor skill, proprioception and motor activation to allow for proper function of scapular, scapulothoracic and UE control with self care, carrying, lifting, driving/computer work.      Home Exercise Program:    [x] (90705) Reviewed/Progressed HEP activities related to strengthening, flexibility, endurance, ROM of scapular, scapulothoracic and UE control with self care, reaching, carrying, lifting, house/yardwork, driving/computer work  [] (79550) Reviewed/Progressed HEP activities related to improving balance, coordination, kinesthetic sense, posture, motor skill, proprioception of scapular, scapulothoracic and UE control with self care, reaching, carrying, lifting, house/yardwork, driving/computer work      Manual Treatments:  PROM / STM / Oscillations-Mobs:  G-I, II, III, IV (PA's, Inf., Post.)  [] (53382) Provided manual therapy to mobilize soft tissue/joints of cervical/CT, scapular GHJ and UE for the purpose of modulating pain, promoting relaxation,  increasing ROM, reducing/eliminating soft tissue swelling/inflammation/restriction, improving soft tissue extensibility and allowing for proper ROM for normal function with self care, reaching, carrying, lifting, house/yardwork, driving/computer work    Charges:  Timed Code Treatment Minutes: 60   Total Treatment Minutes: 60       [] EVAL (LOW) 32897 (typically 20 minutes face-to-face)  [] EVAL (MOD) 48839 (typically 30 minutes face-to-face)  [] EVAL (HIGH) 29567 (typically 45 minutes face-to-face)  [] RE-EVAL     [x] QF(56846) x 2    [] Dry needle 1 or 2 Muscles (74350)  [x] NMR (44245) x  1   [] Dry needle 3+ Muscles (53687)  [x] Manual (67012) x 1    [] Ultrasound (02820) x  [] TA (81325) x     [] Mech Traction (82039)  [] ES(attended) (29738)     [] ES (un) (70380):   [] Vasopump (05060) [] Ionto (42477)   [] Other:    If [facility] Please Indicate Time In/Out  CPT Code Time in Time out   man 200 215   exs 215 245   nmr 245 300                    Approval Dates:  CPT Code Units Approved Units Used  Date Updated:                         ASSESSMENT:  See eval    Treatment/Activity Tolerance:  [x] Patient tolerated treatment well [] Patient limited by fatique  [] Patient limited by pain  [] Patient limited by other medical complications  [] Other:     Overall Progression Towards Functional goals/ Treatment Progress Update:  [x] Patient is progressing as expected towards functional goals listed.     [] Progression is slowed due to complexities/Impairments listed. [] Progression has been slowed due to co-morbidities. [] Plan just implemented, too soon to assess goals progression <30days   [] Goals require adjustment due to lack of progress  [] Patient is not progressing as expected and requires additional follow up with physician  [] Other    Prognosis for POC: [x] Good [] Fair  [] Poor    Patient requires continued skilled intervention: [x] Yes  [] No      PLAN: UE arom, aarom, prom, strengthening, scapular strength, myofascial release, joint mobs to gh, sc, ac, scapular thoracic, modalities for pain, hep. He has poor scap position and control. ROM is relatively good. Work on strength of all aspects of the shoulder and progress to heavy weights and activities to mimic work as a . Consider bfr and DN  4/29/22  Assess bfr, increase stab exs  5/18/22  Progress wb and stabilty exs, dynamic stab    [] Continue per plan of care [] Alter current plan (see comments)  [x] Plan of care initiated [] Hold pending MD visit [] Discharge    Electronically signed by: Cathi Figueroa PT     Note: If patient does not return for scheduled/recommended follow up visits, this note will serve as a discharge from care along with the most recent update on progress.

## 2022-05-24 ENCOUNTER — HOSPITAL ENCOUNTER (OUTPATIENT)
Dept: PHYSICAL THERAPY | Age: 22
Setting detail: THERAPIES SERIES
Discharge: HOME | End: 2022-05-24
Payer: COMMERCIAL

## 2022-05-24 PROCEDURE — 97110 THERAPEUTIC EXERCISES: CPT

## 2022-05-24 PROCEDURE — 97112 NEUROMUSCULAR REEDUCATION: CPT

## 2022-05-24 PROCEDURE — 97140 MANUAL THERAPY 1/> REGIONS: CPT

## 2022-05-24 NOTE — FLOWSHEET NOTE
[address] [facility] [address] [place] [address]  Phone: [phone]   Fax:     [phone]        Physical Therapy Treatment Note/ Progress Report:       Date:  2022    Patient Name:  Marcela Jimenez    :  2000  MRN: [id number]    Pertinent Medical History:Additional Pertinent Hx: asthma, adhd, ibs, right wrist fracture    Medical/Treatment Diagnosis Information:  · Diagnosis: S43.401A (ICD-10-CM) - Sprain of right shoulder, unspecified shoulder sprain type, initial encounter  · Treatment Diagnosis: decreased abilty to use right ue and work    Insurance/Certification information:  PT Insurance Information: [facility]  Physician Information:  Referring Provider (secondary): Dr. Teodoro Mahoney of care signed (Y/N): routed  Imaging   Right Shoulder X-Ray: 3 view x-rays of the shoulder including AP, scapular Y, and axillary obtained and reviewed  [facility] Joint: no abnormalities noted  Glenohumeral joint: no abnormalities noted  Elevation humeral head: absent     Right Shoulder MRI 3/24/22: I independently reviewed the images, as well as the radiology report. . Bone contusion at the posterolateral humeral head.  No obvious osseous   Bankart deformity. 2. No partial or full thickness rotator cuff tear.  Mild supraspinatus and   infraspinatus tendinosis. 3. Tearing of the inferior and anterior inferior labrum.  Mild underlying   diffuse labral degeneration.  2 mm anterior paralabral cyst.   4. Mild glenohumeral chondromalacia.    5. Mild degenerative change of the right AC joint.        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 He c/o constant pain which is sharp with repetitive movements or attempted lifting. He also can';t reach behind his back or work overhead. He has a humeral head bruise and 2 small labral tears. 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 SUBJECTIVE: Pt states, \" I can't use my arm like I need to \"  4/29/22  Pt states ,\" just stiff from exercising \"  05/03/22 Patient reports slight soreness when reaching behind the back and to the side. States he still gets some popping in his shoulder. 05/05/22 Patient reports shoulder is feeling a little stronger,still getting some popping and soreness. 5/10/22  Pt states, \"   05/13/22 Patient reports shoulder feels a little stronger,still getting some popping and soreness. 05/17/22 Patient reports some soreness in his shoulder after playing amita golf over the weekend. 5/19/22  Pt states, \" feel stronger but still having pain,  I tried to do a pull up and   05/24/22 Patient reports shoulder is doing ok,does not hurt as much when trying to do pull up.     OBJECTIVE:      ROM Left Right   Shoulder Flex wfl all 170   Shoulder Abd   120   Shoulder ER   60   Shoulder IR   L4                   Strength  Left Right   Shoulder Flex 5/5 4-   Shoulder Scap   4-   Shoulder ER   4-   Shoulder IR   4-                  RESTRICTIONS/PRECAUTIONS:     Exercises/Interventions:   Therapeutic Ex (32760)  Min: Resistance/Reps Cues/Notes   ube L 3 X 4 min    isos thru range X 3 min    prom All ranges    Rhythmic stab Er and 90 deg x 3 min total    Prone scap add with ext X 2 min with 2 #    Sl er 2 # 2 x 10    tband horiz add scap retract     All 4's horiz abd 3# x 2 min alt    Lat pull                    Manual Intervention  (02748)  Min:15 Prom, mfr to entire shoulder and scap, traps , levator, very tight in levator, infra, teres, sub scap very tight. NMR re-education (22055)  Min:     walkouts Blue x 2 min ea side    Body blade   2# X 2 min ea    resume   Er wall slide Red x 2 min       lawnmower 10# x 30    fencing Green x 2 min    trx push up X 30    trx pull up X 30    bosu push up     jfish all 4's horiz abd               Therapeutic Activity (97633)  Min: Discussed mechanism of injury, anatomy, physiology, biomechanics, sleeping positions,  and strategies to accelerate the healing process. Answered all of patients questions regarding injury. Gave necessary information to get any equipment needed. Modalities:  Min                 Smart Cuff Pro Smart Phase Protocol       Spoke with Dr. Roland Moy  regarding the use of BFR with patient. BFR Session 3             Smart Cuff Pro Personalized Tourniquet System for BFR     LE: up to 80% LOP       UE: up to 50% LOP  4/29/22 5/10     BFR Location: biceps  BFR set at: 73 mmHg       Total Time under Tourniquet :  8      Protocol: 30/15/15/15       Exercises:   Reps 30 sec Notes    Biceps curls  5# x s        # of reps required  30 Rest     completed  30 30    W green  reps required  15 Rest     completed  15 30    tband ir green reps required  15 Rest     completed  15 30    Lawnmower  reps required  15 Rest     completed  15 30                Other Therapeutic Activities:  Pt was educated on PT POC, Diagnosis, Prognosis, pathomechanics as well as frequency and duration of scheduling future physical therapy appointments. Time was also taken on this day to answer all patient questions and participation in PT. Reviewed appointment policy in detail with patient and patient verbalized understanding.      Home Exercise Program:Patient demonstrated proper technique, good tolerance,  and was given written instructions for the above exercises  Access Code: [facility]  URL: [email or web address]  Date: 04/27/2022  Prepared by: Esvin Montenegro    Exercises  W - 1 x daily - 7 x weekly - 3 sets - 10 reps  Supine Scapular Protraction in Flexion with Dumbbells - 1 x daily - 7 x weekly - 3 sets - 10 reps  Wall Push Up with Plus - 1 x daily - 7 x weekly - 3 sets - 10 reps  Shoulder Internal Rotation with Resistance - 1 x daily - 7 x weekly - 3 sets - 10 reps    Shoulder extension with resistance - Neutral - 1 x daily - 7 x weekly - 3 sets - 10 reps  Gave tband lat pull for home     Therapeutic Exercise and NMR EXR                                                                           Hep 4/29/22  Serratus exs with tband,     ER Wall walk with red band  [] (73704) Provided verbal/tactile cueing for activities related to strengthening, flexibility, endurance, ROM  for improvements in scapular, scapulothoracic and UE control with self care, reaching, carrying, lifting, house/yardwork, driving/computer work.    [] (62362) Provided verbal/tactile cueing for activities related to improving balance, coordination, kinesthetic sense, posture, motor skill, proprioception  to assist with  scapular, scapulothoracic and UE control with self care, reaching, carrying, lifting, house/yardwork, driving/computer work. Therapeutic Activities:    [] (30227 or 71941) Provided verbal/tactile cueing for activities related to improving balance, coordination, kinesthetic sense, posture, motor skill, proprioception and motor activation to allow for proper function of scapular, scapulothoracic and UE control with self care, carrying, lifting, driving/computer work.      Home Exercise Program:    [x] (94612) Reviewed/Progressed HEP activities related to strengthening, flexibility, endurance, ROM of scapular, scapulothoracic and UE control with self care, reaching, carrying, lifting, house/yardwork, driving/computer work  [] (01603) Reviewed/Progressed HEP activities related to improving balance, coordination, kinesthetic sense, posture, motor skill, proprioception of scapular, scapulothoracic and UE control with self care, reaching, carrying, lifting, house/yardwork, driving/computer work      Manual Treatments:  PROM / STM / Oscillations-Mobs:  G-I, II, III, IV (PA's, Inf., Post.)  [] (89862 [facility]) Provided manual therapy to mobilize soft tissue/joints of cervical/CT, scapular GHJ and UE for the purpose of modulating pain, promoting relaxation,  increasing ROM, reducing/eliminating soft tissue swelling/inflammation/restriction, improving soft tissue extensibility and allowing for proper ROM for normal function with self care, reaching, carrying, lifting, house/yardwork, driving/computer work    Charges:  Timed Code Treatment Minutes: 60   Total Treatment Minutes: 60       [] EVAL (LOW) 62246 (typically 20 minutes face-to-face)  [] EVAL (MOD) 16827 (typically 30 minutes face-to-face)  [] EVAL (HIGH) 08932 (typically 45 minutes face-to-face)  [] RE-EVAL     [x] MK(93816) x 2    [] Dry needle 1 or 2 Muscles (01513)  [x] NMR (52296) x  1   [] Dry needle 3+ Muscles (92649)  [x] Manual (86888) x 1    [] Ultrasound (00641) x  [] TA (91879) x     [] Mech Traction (14843)  [] ES(attended) (48815)     [] ES (un) (59862):   [] Vasopump (52272) [] Ionto (30433)   [] Other:    If [facility] Please Indicate Time In/Out  CPT Code Time in Time out   man 3;15 3:30   exs 245 3:15   nmr 3:30 3:45                    Approval Dates:  CPT Code Units Approved Units Used  Date Updated:                         ASSESSMENT:  See eval    Treatment/Activity Tolerance:  [x] Patient tolerated treatment well [] Patient limited by fatique  [] Patient limited by pain  [] Patient limited by other medical complications  [] Other:     Overall Progression Towards Functional goals/ Treatment Progress Update:  [x] Patient is progressing as expected towards functional goals listed. [] Progression is slowed due to complexities/Impairments listed. [] Progression has been slowed due to co-morbidities. [] Plan just implemented, too soon to assess goals progression <30days   [] Goals require adjustment due to lack of progress  [] Patient is not progressing as expected and requires additional follow up with physician  [] Other    Prognosis for POC: [x] Good [] Fair  [] Poor    Patient requires continued skilled intervention: [x] Yes  [] No      PLAN: UE arom, aarom, prom, strengthening, scapular strength, myofascial release, joint mobs to gh, sc, ac, scapular thoracic, modalities for pain, hep. He has poor scap position and control. ROM is relatively good. Work on strength of all aspects of the shoulder and progress to heavy weights and activities to mimic work as a . Consider bfr and DN  4/29/22  Assess bfr, increase stab exs  5/18/22  Progress wb and stabilty exs, dynamic stab    [] Continue per plan of care [] Alter current plan (see comments)  [x] Plan of care initiated [] Hold pending MD visit [] Discharge    Electronically signed by: Luther Luna PTA     Note: If patient does not return for scheduled/recommended follow up visits, this note will serve as a discharge from care along with the most recent update on progress.

## 2022-05-26 ENCOUNTER — HOSPITAL ENCOUNTER (OUTPATIENT)
Dept: PHYSICAL THERAPY | Age: 22
Setting detail: THERAPIES SERIES
Discharge: HOME | End: 2022-05-26
Payer: COMMERCIAL

## 2022-05-26 PROCEDURE — 97140 MANUAL THERAPY 1/> REGIONS: CPT

## 2022-05-26 PROCEDURE — 97112 NEUROMUSCULAR REEDUCATION: CPT

## 2022-05-26 PROCEDURE — 97110 THERAPEUTIC EXERCISES: CPT

## 2022-05-26 PROCEDURE — 20561 NDL INSJ W/O NJX 3+ MUSC: CPT

## 2022-05-26 NOTE — FLOWSHEET NOTE
[address] [facility] [address] [place] [address]  Phone: [phone]   Fax:     [phone]        Physical Therapy Treatment Note/ Progress Report:       Date:  2022    Patient Name:  Cristi Guzmán    :  2000  MRN: [id number]    Pertinent Medical History:Additional Pertinent Hx: asthma, adhd, ibs, right wrist fracture    Medical/Treatment Diagnosis Information:  · Diagnosis: S43.401A (ICD-10-CM) - Sprain of right shoulder, unspecified shoulder sprain type, initial encounter  · Treatment Diagnosis: decreased abilty to use right ue and work    Insurance/Certification information:  PT Insurance Information: [facility]  Physician Information:  Referring Provider (secondary): Dr. Yifan Mckinney of care signed (Y/N): routed  Imaging   Right Shoulder X-Ray: 3 view x-rays of the shoulder including AP, scapular Y, and axillary obtained and reviewed  [facility] Joint: no abnormalities noted  Glenohumeral joint: no abnormalities noted  Elevation humeral head: absent     Right Shoulder MRI 3/24/22: I independently reviewed the images, as well as the radiology report. . Bone contusion at the posterolateral humeral head.  No obvious osseous   Bankart deformity. 2. No partial or full thickness rotator cuff tear.  Mild supraspinatus and   infraspinatus tendinosis. 3. Tearing of the inferior and anterior inferior labrum.  Mild underlying   diffuse labral degeneration.  2 mm anterior paralabral cyst.   4. Mild glenohumeral chondromalacia.    5. Mild degenerative change of the right AC joint.        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 He c/o constant pain which is sharp with repetitive movements or attempted lifting. He also can';t reach behind his back or work overhead. He has a humeral head bruise and 2 small labral tears. 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 SUBJECTIVE: Pt states, \" I can't use my arm like I need to \"  4/29/22  Pt states ,\" just stiff from exercising \"  05/03/22 Patient reports slight soreness when reaching behind the back and to the side. States he still gets some popping in his shoulder. 05/05/22 Patient reports shoulder is feeling a little stronger,still getting some popping and soreness. 5/10/22  Pt states, \"   05/13/22 Patient reports shoulder feels a little stronger,still getting some popping and soreness. 05/17/22 Patient reports some soreness in his shoulder after playing amita golf over the weekend.   5/19/22  Pt states, \" feel stronger but still having pain,  I tried to do a pull up and   05/24/22 Patient reports shoulder is doing ok,does not hurt as much when trying to do pull up.  5/26/22  Pt states, \" Doing ok,     OBJECTIVE:      ROM Left Right   Shoulder Flex wfl all 170   Shoulder Abd   120   Shoulder ER   60   Shoulder IR   L4                   Strength  Left Right   Shoulder Flex 5/5 4-   Shoulder Scap   4-   Shoulder ER   4-   Shoulder IR   4-                  RESTRICTIONS/PRECAUTIONS:     Exercises/Interventions:   Therapeutic Ex (93798)  Min: Resistance/Reps Cues/Notes   ube L 3 X 4 min    isos thru range X 3 min    prom All ranges    Rhythmic stab Er and 90 deg x 3 min total    Prone scap add with ext X 2 min with 2 #    Sl er 2 # 2 x 10    tband horiz add     scap retract     All 4's horiz abd 3# x 2 min alt    Lat pull                    Manual Intervention  (10229)  Min:15 Prom, mfr to entire shoulder and scap, traps , levator, very tight in levator, infra, teres, sub scap very tight. NMR re-education (89672)  Min:     walkouts Blue x 2 min ea side    Body blade   2# X 2 min ea    resume   Er wall slide Red x 2 min       lawnmower 10# x 30    fencing Green x 2 min    trx push up X 30    trx pull up X 30    bosu push up     jfish all 4's horiz abd               Therapeutic Activity (36011)  Min: Discussed mechanism of injury, anatomy, physiology, biomechanics, sleeping positions,  and strategies to accelerate the healing process. Answered all of patients questions regarding injury. Gave necessary information to get any equipment needed. Modalities:  Min                 Smart Cuff Pro Smart Phase Protocol                               Notes                                 Spoke with ,   regarding the use of Dry Needling     Dry needling manual therapy: consisted on the placement of 12 needles in the following muscles: right traps, levator, supra, infra, teres major and minor, med and post delt  . A 60 mm needle was inserted, piston, rotated, and coned to produce intramuscular mobilization. These techniques were used to restore functional range of motion, reduce muscle spasm and induce healing in the corresponding musculature. (43387)  Clean Technique was utilized today while applying Dry needling treatment. The treatment sites where cleaned with 70% solution of  isopropyl alcohol . The PT washed their hands and utilized treatment gloves along with hand  prior to inserting the needles. All needles where removed and discarded in the appropriate sharps container. MD has given verbal and/or written approval for this treatment.                  Other Therapeutic Activities:  Pt was educated on PT POC, Diagnosis, Prognosis, pathomechanics as well as frequency and duration of scheduling future physical therapy appointments. Time was also taken on this day to answer all patient questions and participation in PT. Reviewed appointment policy in detail with patient and patient verbalized understanding. Home Exercise Program:Patient demonstrated proper technique, good tolerance,  and was given written instructions for the above exercises  Access Code: [facility]  URL: [email or web address]  Date: 04/27/2022  Prepared by: Rocky Puente    Exercises  W - 1 x daily - 7 x weekly - 3 sets - 10 reps  Supine Scapular Protraction in Flexion with Dumbbells - 1 x daily - 7 x weekly - 3 sets - 10 reps  Wall Push Up with Plus - 1 x daily - 7 x weekly - 3 sets - 10 reps  Shoulder Internal Rotation with Resistance - 1 x daily - 7 x weekly - 3 sets - 10 reps    Shoulder extension with resistance - Neutral - 1 x daily - 7 x weekly - 3 sets - 10 reps  Gave tband lat pull for home     Therapeutic Exercise and NMR EXR                                                                           Hep 4/29/22  Serratus exs with tband,     ER Wall walk with red band  [] (15685) Provided verbal/tactile cueing for activities related to strengthening, flexibility, endurance, ROM  for improvements in scapular, scapulothoracic and UE control with self care, reaching, carrying, lifting, house/yardwork, driving/computer work.    [] (25936) Provided verbal/tactile cueing for activities related to improving balance, coordination, kinesthetic sense, posture, motor skill, proprioception  to assist with  scapular, scapulothoracic and UE control with self care, reaching, carrying, lifting, house/yardwork, driving/computer work.     Therapeutic Activities:    [] (93914 or 04654) Provided verbal/tactile cueing for activities related to improving balance, coordination, kinesthetic sense, posture, motor skill, proprioception and motor Used  Date Updated:                         ASSESSMENT:  See eval    Treatment/Activity Tolerance:  [x] Patient tolerated treatment well [] Patient limited by fatique  [] Patient limited by pain  [] Patient limited by other medical complications  [] Other:     Overall Progression Towards Functional goals/ Treatment Progress Update:  [x] Patient is progressing as expected towards functional goals listed. [] Progression is slowed due to complexities/Impairments listed. [] Progression has been slowed due to co-morbidities. [] Plan just implemented, too soon to assess goals progression <30days   [] Goals require adjustment due to lack of progress  [] Patient is not progressing as expected and requires additional follow up with physician  [] Other    Prognosis for POC: [x] Good [] Fair  [] Poor    Patient requires continued skilled intervention: [x] Yes  [] No      PLAN: UE arom, aarom, prom, strengthening, scapular strength, myofascial release, joint mobs to gh, sc, ac, scapular thoracic, modalities for pain, hep. He has poor scap position and control. ROM is relatively good. Work on strength of all aspects of the shoulder and progress to heavy weights and activities to mimic work as a . Consider bfr and DN  4/29/22  Assess bfr, increase stab exs  5/18/22  Progress wb and stabilty exs, dynamic stab   5/26/22  Assess dn    [] Continue per plan of care [] Alter current plan (see comments)  [x] Plan of care initiated [] Hold pending MD visit [] Discharge    Electronically signed by: Kirill Pearl, PT     Note: If patient does not return for scheduled/recommended follow up visits, this note will serve as a discharge from care along with the most recent update on progress.

## 2022-05-31 ENCOUNTER — HOSPITAL ENCOUNTER (OUTPATIENT)
Dept: PHYSICAL THERAPY | Age: 22
Setting detail: THERAPIES SERIES
Discharge: HOME | End: 2022-05-31
Payer: COMMERCIAL

## 2022-05-31 PROCEDURE — 97140 MANUAL THERAPY 1/> REGIONS: CPT

## 2022-05-31 PROCEDURE — 97110 THERAPEUTIC EXERCISES: CPT

## 2022-05-31 PROCEDURE — 97112 NEUROMUSCULAR REEDUCATION: CPT

## 2022-05-31 PROCEDURE — 97016 VASOPNEUMATIC DEVICE THERAPY: CPT

## 2022-05-31 NOTE — FLOWSHEET NOTE
[address] [facility] [place] [place] [address]  Phone: [phone]   Fax:     [phone]        Physical Therapy Treatment Note/ Progress Report:       Date:  2022    Patient Name:  Leoncio Manzo    :  2000  MRN: [id number]    Pertinent Medical History:Additional Pertinent Hx: asthma, adhd, ibs, right wrist fracture    Medical/Treatment Diagnosis Information:  · Diagnosis: S43.401A (ICD-10-CM) - Sprain of right shoulder, unspecified shoulder sprain type, initial encounter  · Treatment Diagnosis: decreased abilty to use right ue and work    Insurance/Certification information:  PT Insurance Information: [employer] [facility]  Physician Information:  Referring Provider (secondary): Dr. Oxana Padilla of care signed (Y/N): routed  Imaging   Right Shoulder X-Ray: 3 view x-rays of the shoulder including AP, scapular Y, and axillary obtained and reviewed  [facility] Joint: no abnormalities noted  Glenohumeral joint: no abnormalities noted  Elevation humeral head: absent     Right Shoulder MRI 3/24/22: I independently reviewed the images, as well as the radiology report. . Bone contusion at the posterolateral humeral head.  No obvious osseous   Bankart deformity. 2. No partial or full thickness rotator cuff tear.  Mild supraspinatus and   infraspinatus tendinosis. 3. Tearing of the inferior and anterior inferior labrum.  Mild underlying   diffuse labral degeneration.  2 mm anterior paralabral cyst.   4. Mild glenohumeral chondromalacia.    5. Mild degenerative change of the right AC joint.        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 He c/o constant pain which is sharp with repetitive movements or attempted lifting. He also can';t reach behind his back or work overhead. He has a humeral head bruise and 2 small labral tears. 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 SUBJECTIVE: Pt states, \" I can't use my arm like I need to \"  4/29/22  Pt states ,\" just stiff from exercising \"  05/03/22 Patient reports slight soreness when reaching behind the back and to the side. States he still gets some popping in his shoulder. 05/05/22 Patient reports shoulder is feeling a little stronger,still getting some popping and soreness. 5/10/22  Pt states, \"   05/13/22 Patient reports shoulder feels a little stronger,still getting some popping and soreness. 05/17/22 Patient reports some soreness in his shoulder after playing amita golf over the weekend.   5/19/22  Pt states, \" feel stronger but still having pain,  I tried to do a pull up and   05/24/22 Patient reports shoulder is doing ok,does not hurt as much when trying to do pull up.  5/26/22  Pt states, \" Doing ok,   5/31/22  Pt states, \" A bit sore from doing stuff over the weekend \"    OBJECTIVE:      ROM Left Right   Shoulder Flex wfl all 170   Shoulder Abd   120   Shoulder ER   60   Shoulder IR   L4                   Strength  Left Right   Shoulder Flex 5/5 4-   Shoulder Scap   4-   Shoulder ER   4-   Shoulder IR   4-                  RESTRICTIONS/PRECAUTIONS:     Exercises/Interventions:   Therapeutic Ex (17114)  Min: Resistance/Reps Cues/Notes   ube L 3 X 4 min    isos thru range X 3 min    prom All ranges    Rhythmic stab Er and 90 deg x 3 min total    Prone scap add with ext X 2 min with 2 #    Sl er 2 # 2 x 10    tband horiz add     scap retract     All 4's horiz abd 4# x 2 min alt    Lat pull                    Manual Intervention  (20517)  Min:15 Prom, mfr to entire shoulder and scap, traps , levator, very tight in levator, infra, teres, sub scap very tight. NMR re-education (99954)  Min:     walkouts Blue x 2 min ea side    Body blade   2# X 2 min ea    resume   Er wall slide Red x 2 min       lawnmower 10# x 30    fencing Green x 2 min   trx push up    trx pull up    bosu push up     jfish all 4's horiz abd               Therapeutic Activity (23377)  Min: Discussed mechanism of injury, anatomy, physiology, biomechanics, sleeping positions,  and strategies to accelerate the healing process. Answered all of patients questions regarding injury. Gave necessary information to get any equipment needed. Modalities:  Min Vaso pump due to increased pain and edema                Smart Cuff Pro Smart Phase Protocol       Spoke with Dr. Iveth Terry  regarding the use of BFR with patient. BFR Session 3             Smart Cuff Pro Personalized Tourniquet System for BFR     LE: up to 80% LOP       UE: up to 50% LOP  4/29/22 5/10 5/31/22    BFR Location: biceps  BFR set at: 73 mmHg       Total Time under Tourniquet :  8      Protocol: 30/15/15/15       Exercises:   Reps 30 sec Notes    Biceps curls  5# x s        # of reps required  30 Rest     completed  30 30    W green  reps required  15 Rest     completed  15 30    tband ir green reps required  15 Rest     completed  15 30    Lawnmower 6# reps required  15 Rest     completed  15 30                  Other Therapeutic Activities:  Pt was educated on PT POC, Diagnosis, Prognosis, pathomechanics as well as frequency and duration of scheduling future physical therapy appointments.  Time was also taken on this day to answer all patient questions and participation in PT. Reviewed appointment policy in detail with patient and patient verbalized understanding. Home Exercise Program:Patient demonstrated proper technique, good tolerance,  and was given written instructions for the above exercises  Access Code: [facility]  URL: [email or web address]  Date: 04/27/2022  Prepared by: Mervat Ray    Exercises  W - 1 x daily - 7 x weekly - 3 sets - 10 reps  Supine Scapular Protraction in Flexion with Dumbbells - 1 x daily - 7 x weekly - 3 sets - 10 reps  Wall Push Up with Plus - 1 x daily - 7 x weekly - 3 sets - 10 reps  Shoulder Internal Rotation with Resistance - 1 x daily - 7 x weekly - 3 sets - 10 reps    Shoulder extension with resistance - Neutral - 1 x daily - 7 x weekly - 3 sets - 10 reps  Gave tband lat pull for home     Therapeutic Exercise and NMR EXR                                                                           Hep 4/29/22  Serratus exs with tband,     ER Wall walk with red band  [] (33528) Provided verbal/tactile cueing for activities related to strengthening, flexibility, endurance, ROM  for improvements in scapular, scapulothoracic and UE control with self care, reaching, carrying, lifting, house/yardwork, driving/computer work.    [] (45513) Provided verbal/tactile cueing for activities related to improving balance, coordination, kinesthetic sense, posture, motor skill, proprioception  to assist with  scapular, scapulothoracic and UE control with self care, reaching, carrying, lifting, house/yardwork, driving/computer work. Therapeutic Activities:    [] (02502 or 61023) Provided verbal/tactile cueing for activities related to improving balance, coordination, kinesthetic sense, posture, motor skill, proprioception and motor activation to allow for proper function of scapular, scapulothoracic and UE control with self care, carrying, lifting, driving/computer work.      Home Exercise Program:    [x] (22622) Reviewed/Progressed HEP activities related to strengthening, flexibility, endurance, ROM of scapular, scapulothoracic and UE control with self care, reaching, carrying, lifting, house/yardwork, driving/computer work  [] (21006) Reviewed/Progressed HEP activities related to improving balance, coordination, kinesthetic sense, posture, motor skill, proprioception of scapular, scapulothoracic and UE control with self care, reaching, carrying, lifting, house/yardwork, driving/computer work      Manual Treatments:  PROM / STM / Oscillations-Mobs:  G-I, II, III, IV (PA's, Inf., Post.)  [] (05562) Provided manual therapy to mobilize soft tissue/joints of cervical/CT, scapular GHJ and UE for the purpose of modulating pain, promoting relaxation,  increasing ROM, reducing/eliminating soft tissue swelling/inflammation/restriction, improving soft tissue extensibility and allowing for proper ROM for normal function with self care, reaching, carrying, lifting, house/yardwork, driving/computer work    Charges:  Timed Code Treatment Minutes: 45   Total Treatment Minutes: 65       [] EVAL (LOW) 22574 (typically 20 minutes face-to-face)  [] EVAL (MOD) 35147 (typically 30 minutes face-to-face)  [] EVAL (HIGH) 02826 (typically 45 minutes face-to-face)  [] RE-EVAL     [x] JZ(29050) x1   [] Dry needle 1 or 2 Muscles (68158)  [x] NMR (42554) x  1   [] Dry needle 3+ Muscles (52975)  [x] Manual (36086) x 1    [] Ultrasound (19196) x  [] TA (45206) x     [] Mech Traction (02037)  [] ES(attended) (06066)     [] ES (un) (75183):   [] Vasopump (75320) [] Ionto (43702)   [] Other:    If [facility] Please Indicate Time In/Out  CPT Code Time in Time out   man 330 345   exs 345 400        nmr 400 415   vaso 415 430               Approval Dates:  CPT Code Units Approved Units Used  Date Updated:                         ASSESSMENT:  See eval    Treatment/Activity Tolerance:  [x] Patient tolerated treatment well [] Patient limited by fatique  [] Patient limited by pain  [] Patient limited by other medical complications  [] Other:     Overall Progression Towards Functional goals/ Treatment Progress Update:  [x] Patient is progressing as expected towards functional goals listed. [] Progression is slowed due to complexities/Impairments listed. [] Progression has been slowed due to co-morbidities. [] Plan just implemented, too soon to assess goals progression <30days   [] Goals require adjustment due to lack of progress  [] Patient is not progressing as expected and requires additional follow up with physician  [] Other    Prognosis for POC: [x] Good [] Fair  [] Poor    Patient requires continued skilled intervention: [x] Yes  [] No      PLAN: UE arom, aarom, prom, strengthening, scapular strength, myofascial release, joint mobs to gh, sc, ac, scapular thoracic, modalities for pain, hep. He has poor scap position and control. ROM is relatively good. Work on strength of all aspects of the shoulder and progress to heavy weights and activities to mimic work as a . Consider bfr and DN  4/29/22  Assess bfr, increase stab exs  5/18/22  Progress wb and stabilty exs, dynamic stab   5/26/22  Assess dn  5/31/22  DN did not make a significant difference. He may be at a point that he needs work hardening. 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 He see's MD in a week. Will see what md decides to do.      [] Continue per plan of care [] Alter current plan (see comments)  [x] Plan of care initiated [] Hold pending MD visit [] Discharge    Electronically signed by: Davey Hartmann PT     Note: If patient does not return for scheduled/recommended follow up visits, this note will serve as a discharge from care along with the most recent update on progress.

## 2022-06-03 ENCOUNTER — OFFICE VISIT (OUTPATIENT)
Dept: BEHAVIORAL/MENTAL HEALTH CLINIC | Age: 22
End: 2022-06-03
Payer: COMMERCIAL

## 2022-06-03 DIAGNOSIS — F41.9 ANXIETY: Primary | ICD-10-CM

## 2022-06-03 PROCEDURE — 90837 PSYTX W PT 60 MINUTES: CPT

## 2022-06-03 PROCEDURE — 1036F TOBACCO NON-USER: CPT

## 2022-06-07 ENCOUNTER — OFFICE VISIT (OUTPATIENT)
Dept: ORTHOPEDIC SURGERY | Age: 22
End: 2022-06-07
Payer: COMMERCIAL

## 2022-06-07 ENCOUNTER — OFFICE VISIT (OUTPATIENT)
Dept: FAMILY MEDICINE CLINIC | Age: 22
End: 2022-06-07
Payer: COMMERCIAL

## 2022-06-07 VITALS — WEIGHT: 122 LBS | BODY MASS INDEX: 20.33 KG/M2 | RESPIRATION RATE: 16 BRPM | HEIGHT: 65 IN

## 2022-06-07 VITALS
WEIGHT: 123 LBS | OXYGEN SATURATION: 98 % | BODY MASS INDEX: 20.49 KG/M2 | SYSTOLIC BLOOD PRESSURE: 114 MMHG | DIASTOLIC BLOOD PRESSURE: 70 MMHG | HEIGHT: 65 IN | HEART RATE: 85 BPM

## 2022-06-07 DIAGNOSIS — F41.9 ANXIETY: ICD-10-CM

## 2022-06-07 DIAGNOSIS — S40.011A CONTUSION OF RIGHT SHOULDER, INITIAL ENCOUNTER: Primary | ICD-10-CM

## 2022-06-07 DIAGNOSIS — Z00.00 WELL ADULT EXAM: Primary | ICD-10-CM

## 2022-06-07 DIAGNOSIS — F32.1 CURRENT MODERATE EPISODE OF MAJOR DEPRESSIVE DISORDER WITHOUT PRIOR EPISODE (HCC): ICD-10-CM

## 2022-06-07 PROCEDURE — 99395 PREV VISIT EST AGE 18-39: CPT | Performed by: FAMILY MEDICINE

## 2022-06-07 PROCEDURE — 99213 OFFICE O/P EST LOW 20 MIN: CPT | Performed by: ORTHOPAEDIC SURGERY

## 2022-06-07 RX ORDER — MIRTAZAPINE 30 MG/1
15 TABLET, FILM COATED ORAL NIGHTLY
COMMUNITY
Start: 2022-06-07 | End: 2022-08-09

## 2022-06-07 RX ORDER — VENLAFAXINE HYDROCHLORIDE 37.5 MG/1
37.5 CAPSULE, EXTENDED RELEASE ORAL DAILY
Qty: 30 CAPSULE | Refills: 0 | Status: SHIPPED | OUTPATIENT
Start: 2022-06-07 | End: 2022-08-09

## 2022-06-07 RX ORDER — VENLAFAXINE HYDROCHLORIDE 75 MG/1
75 CAPSULE, EXTENDED RELEASE ORAL DAILY
Qty: 90 CAPSULE | Refills: 1 | Status: SHIPPED | OUTPATIENT
Start: 2022-06-07 | End: 2022-08-09

## 2022-06-07 ASSESSMENT — PATIENT HEALTH QUESTIONNAIRE - PHQ9
SUM OF ALL RESPONSES TO PHQ9 QUESTIONS 1 & 2: 3
2. FEELING DOWN, DEPRESSED OR HOPELESS: 2
SUM OF ALL RESPONSES TO PHQ QUESTIONS 1-9: 9
7. TROUBLE CONCENTRATING ON THINGS, SUCH AS READING THE NEWSPAPER OR WATCHING TELEVISION: 2
10. IF YOU CHECKED OFF ANY PROBLEMS, HOW DIFFICULT HAVE THESE PROBLEMS MADE IT FOR YOU TO DO YOUR WORK, TAKE CARE OF THINGS AT HOME, OR GET ALONG WITH OTHER PEOPLE: 1
9. THOUGHTS THAT YOU WOULD BE BETTER OFF DEAD, OR OF HURTING YOURSELF: 0
5. POOR APPETITE OR OVEREATING: 1
6. FEELING BAD ABOUT YOURSELF - OR THAT YOU ARE A FAILURE OR HAVE LET YOURSELF OR YOUR FAMILY DOWN: 1
SUM OF ALL RESPONSES TO PHQ QUESTIONS 1-9: 9
8. MOVING OR SPEAKING SO SLOWLY THAT OTHER PEOPLE COULD HAVE NOTICED. OR THE OPPOSITE, BEING SO FIGETY OR RESTLESS THAT YOU HAVE BEEN MOVING AROUND A LOT MORE THAN USUAL: 0
1. LITTLE INTEREST OR PLEASURE IN DOING THINGS: 1
3. TROUBLE FALLING OR STAYING ASLEEP: 1
SUM OF ALL RESPONSES TO PHQ QUESTIONS 1-9: 9
SUM OF ALL RESPONSES TO PHQ QUESTIONS 1-9: 9
4. FEELING TIRED OR HAVING LITTLE ENERGY: 1
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
## 2022-06-07 NOTE — LETTER
[facility]  [place] [address] [address] [address]  Phone: [phone]  Fax: [phone]    Min Guzman MD        June 7, 2022     Patient: Sandra Riley   YOB: 2000   Date of Visit: 6/7/2022       To Whom It May Concern: It is my medical opinion that Larisa Cabrera may return to work on 6/8/2022 with the following restrictions:   · Desk/clerical duties  · No field work at this time   · Restrictions in place for 2 months  · If these restrictions are unable to be accommodated, the patient will need to be off work. If you have any questions or concerns, please don't hesitate to call.     Sincerely,    Min Guzman MD

## 2022-06-07 NOTE — PROGRESS NOTES
2022    Blood pressure 114/70, pulse 85, height 5' 5\" (1.651 m), weight 123 lb (55.8 kg), SpO2 98 %. Paul Johnson (:  2000) is a 24 y.o. male, here for evaluation of the following medical concerns:    Chief Complaint   Patient presents with    Annual Exam     yearly check up     Here for general check up. He is awaiting clearance from DR Keshia Chinchilla for return to work after having labrum tear, shoulder contusion, pain. Under treatment for anxiety, depression. Associated with intermittent loose and or oily stools    Reports rare epigastric tenderness/pain. He is bothered by pains sporadically- reports ankle and knee pain from walking. It is hard to ignore pains. As of a month ago was still pretty depressed. We increased dose of remeron to 30 mg. He does not feel it affects his mood or appetite. Keeps him a bit groggy. Not pleased with the medicine. He does note that he no longer has thoughts of death (last was about 2-3 weeks ago), weight is up 6 lbs from last month. He saw counselor a week ago and plans a f/u visit in a week or two.  mainly discussed anxiety. He is still lost in his thoughts and 'in my head' and over think things - this sort of ruins his evenings and makes sleep more difficult. This happens about 50% of the time. He is also able to reach out to friend some- share his mood struggles. They were supportive. His physical health is very good. Not overweight. Trying to bulk up- eat more, exercise more (limited from lifting weights, but is walking and listening to music). Last lipid test:  No results found for: CHOL, TRIG, HDL, LDLCALC, LDLDIRECT  Lab Results   Component Value Date    ALT 20 2022    AST 24 2022       Last renal function test:   Lab Results   Component Value Date     2022    K 5.1 2022    BUN 11 2022    CREATININE 1.0 2022     Estimated Creatinine Clearance: 92 mL/min (based on SCr of 1 mg/dL).      Not sexually active. Dental: sees dentist rarely- needs appt  Eye doctor: never    Patient Active Problem List   Diagnosis    Irritable bowel syndrome with diarrhea    Closed displaced fracture of middle third of navicular bone of right wrist        Body mass index is 20.47 kg/m². Wt Readings from Last 3 Encounters:   06/07/22 123 lb (55.8 kg)   05/09/22 116 lb (52.6 kg)   04/11/22 116 lb (52.6 kg)       BP Readings from Last 3 Encounters:   06/07/22 114/70   05/09/22 110/72   04/11/22 104/72       No Known Allergies    Prior to Visit Medications    Medication Sig Taking? Authorizing Provider   mirtazapine (REMERON) 30 MG tablet Take 1 tablet by mouth nightly Yes Solo Jose MD        Social History     Tobacco Use    Smoking status: Never Smoker    Smokeless tobacco: Never Used   Vaping Use    Vaping Use: Never used   Substance Use Topics    Alcohol use: No    Drug use: Not Currently       Review of Systems    Physical Exam  Vitals and nursing note reviewed. Constitutional:       General: He is not in acute distress. Appearance: He is well-developed. He is not toxic-appearing or diaphoretic. HENT:      Head: Normocephalic and atraumatic. Right Ear: Hearing, tympanic membrane, ear canal and external ear normal.      Left Ear: Hearing, tympanic membrane, ear canal and external ear normal.      Nose: Nose normal. No nasal deformity, septal deviation or mucosal edema. Mouth/Throat:      Dentition: Normal dentition. No dental caries. Pharynx: Uvula midline. No oropharyngeal exudate. Eyes:      General: No scleral icterus. Right eye: No discharge. Left eye: No discharge. Conjunctiva/sclera: Conjunctivae normal.      Pupils: Pupils are equal, round, and reactive to light. Neck:      Thyroid: No thyromegaly. Vascular: No carotid bruit. Cardiovascular:      Rate and Rhythm: Normal rate and regular rhythm. Heart sounds: Normal heart sounds.  No murmur heard. No friction rub. No gallop. Pulmonary:      Effort: Pulmonary effort is normal. No respiratory distress. Breath sounds: Normal breath sounds. No wheezing or rales. Chest:      Chest wall: No tenderness. Breasts:      Right: No supraclavicular adenopathy. Left: No supraclavicular adenopathy. Abdominal:      General: Bowel sounds are normal. There is no distension. Palpations: Abdomen is soft. There is no mass. Tenderness: There is no abdominal tenderness. There is no guarding or rebound. Hernia: There is no hernia in the left inguinal area. Genitourinary:     Penis: Circumcised. Testes: Normal.         Right: Mass not present. Left: Mass not present. Musculoskeletal:         General: No tenderness. Normal range of motion. Cervical back: Full passive range of motion without pain, normal range of motion and neck supple. Thoracic back: Normal.      Lumbar back: Normal.   Lymphadenopathy:      Cervical: No cervical adenopathy. Upper Body:      Right upper body: No supraclavicular adenopathy. Left upper body: No supraclavicular adenopathy. Skin:     General: Skin is warm and dry. Findings: No erythema or rash. Nails: There is no clubbing. Neurological:      Mental Status: He is alert and oriented to person, place, and time. Cranial Nerves: No cranial nerve deficit. Motor: No abnormal muscle tone. Coordination: Coordination normal.      Deep Tendon Reflexes: Reflexes are normal and symmetric. Reflexes normal.   Psychiatric:         Speech: Speech normal.         Behavior: Behavior normal.         Thought Content: Thought content normal.         Judgment: Judgment normal.         ASSESSMENT/PLAN:    1. Well adult exam  - Discussed healthy diet/ regular exercise, dental care (at least yearly), vision screening (at least q2 yrs), sunscreen, seatbelt use, smoke alarms; anticip guidance given.    Advised tetanus booster (tdap) at pharmacy. Also 3rd HPV vaccine    2. Current moderate episode of major depressive disorder without prior episode ([facility] [address]  - still symptomatic. Does not like remeron. Reduce doset to 15 mg with plans to stop it in a month or two. Start effexor 37.5 mg (SNRI likely more beneficial than SSRI as he has a lot of somatic preoccupation as a part of his mood disorder)  Incr to 75 mg in a month. We discussed the risks/benefits/alternatives and side effects to be aware of. Advised not to stop effexor suddenly. 3. Anxiety  - As above; med changes are intended to help this also. continue counseling. We discussed the nature of anxiety and ways to subdue it a little. Return in about 2 months (around 8/7/2022) for follow up depression, follow up anxiety. An  electronicsignature was used to authenticate this note.     --Kendal Cavazos MD on 6/7/2022 at 1:52 PM

## 2022-06-08 ENCOUNTER — TELEPHONE (OUTPATIENT)
Dept: ORTHOPEDIC SURGERY | Age: 22
End: 2022-06-08
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
## 2022-06-08 NOTE — TELEPHONE ENCOUNTER
Following the patient's visit on 6/7/2022, Dr. Paige Martinez wishes to request the following for the patient:    Work hardening for 3 weeks  Please advise if a specific referral needs to be placed in Tee's chart for the request.     Please advise clinical staff if further action is needed and/or when approval is received.

## 2022-06-09 ENCOUNTER — TELEPHONE (OUTPATIENT)
Dept: ORTHOPEDIC SURGERY | Age: 22
End: 2022-06-09

## 2022-06-09 DIAGNOSIS — S43.401A SPRAIN OF RIGHT SHOULDER, UNSPECIFIED SHOULDER SPRAIN TYPE, INITIAL ENCOUNTER: Primary | ICD-10-CM

## 2022-06-09 NOTE — TELEPHONE ENCOUNTER
General Question     Subject: Work Hardening Program  Patient and /or Facility Request: [address] Number: [phone]    Beverly Bejarano has concerns with time of his work hardening program and would like a call from clinic

## 2022-06-10 NOTE — TELEPHONE ENCOUNTER
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 I explained that if there is a conflict, the work hardening program would likely take precedence. However, he can work that out with [facility] once it is approved. Patient voiced understanding of the conversation and will contact the office with further questions or concerns.

## 2022-06-22 ENCOUNTER — TELEPHONE (OUTPATIENT)
Dept: ORTHOPEDIC SURGERY | Age: 22
End: 2022-06-22

## 2022-06-22 NOTE — TELEPHONE ENCOUNTER
S/W RUBEN degroot approved until 7/15/2022 and information has been faxed to [facility]. He may proceed calling them to schedule. He states he thought they would call him; I advised that while we request this, we still suggest patients reach out to the facility due to the timeline of the authorization. Titobraydon Bateman was provided the phone number and address for [facility]. Patient voiced understanding of the conversation and will contact the office with further questions or concerns.      [facility]   [address]  [facility], [address] [place] [address]  [phone]

## 2022-07-12 ENCOUNTER — OFFICE VISIT (OUTPATIENT)
Dept: BEHAVIORAL/MENTAL HEALTH CLINIC | Age: 22
End: 2022-07-12
Payer: COMMERCIAL

## 2022-07-12 DIAGNOSIS — F41.9 ANXIETY: Primary | ICD-10-CM

## 2022-07-12 PROCEDURE — 90837 PSYTX W PT 60 MINUTES: CPT

## 2022-07-12 PROCEDURE — 1036F TOBACCO NON-USER: CPT
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
## 2022-08-02 ENCOUNTER — OFFICE VISIT (OUTPATIENT)
Dept: ORTHOPEDIC SURGERY | Age: 22
End: 2022-08-02
Payer: COMMERCIAL

## 2022-08-02 VITALS — BODY MASS INDEX: 21.86 KG/M2 | HEIGHT: 65 IN | RESPIRATION RATE: 14 BRPM | WEIGHT: 131.2 LBS

## 2022-08-02 DIAGNOSIS — S40.011A CONTUSION OF RIGHT SHOULDER, INITIAL ENCOUNTER: Primary | ICD-10-CM

## 2022-08-02 DIAGNOSIS — S43.401A SPRAIN OF RIGHT SHOULDER, UNSPECIFIED SHOULDER SPRAIN TYPE, INITIAL ENCOUNTER: ICD-10-CM

## 2022-08-02 PROCEDURE — 99213 OFFICE O/P EST LOW 20 MIN: CPT | Performed by: ORTHOPAEDIC SURGERY

## 2022-08-09 ENCOUNTER — OFFICE VISIT (OUTPATIENT)
Dept: FAMILY MEDICINE CLINIC | Age: 22
End: 2022-08-09
Payer: COMMERCIAL

## 2022-08-09 VITALS
SYSTOLIC BLOOD PRESSURE: 110 MMHG | HEIGHT: 65 IN | DIASTOLIC BLOOD PRESSURE: 64 MMHG | RESPIRATION RATE: 18 BRPM | WEIGHT: 131 LBS | OXYGEN SATURATION: 98 % | BODY MASS INDEX: 21.83 KG/M2 | HEART RATE: 95 BPM

## 2022-08-09 DIAGNOSIS — F32.4 MAJOR DEPRESSIVE DISORDER WITH SINGLE EPISODE, IN PARTIAL REMISSION (HCC): Primary | ICD-10-CM

## 2022-08-09 PROCEDURE — G8420 CALC BMI NORM PARAMETERS: HCPCS | Performed by: FAMILY MEDICINE

## 2022-08-09 PROCEDURE — 1036F TOBACCO NON-USER: CPT | Performed by: FAMILY MEDICINE

## 2022-08-09 PROCEDURE — 99213 OFFICE O/P EST LOW 20 MIN: CPT | Performed by: FAMILY MEDICINE

## 2022-08-09 PROCEDURE — G8427 DOCREV CUR MEDS BY ELIG CLIN: HCPCS | Performed by: FAMILY MEDICINE
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
## 2022-10-26 ENCOUNTER — TELEMEDICINE (OUTPATIENT)
Dept: FAMILY MEDICINE CLINIC | Age: 22
End: 2022-10-26
Payer: COMMERCIAL

## 2022-10-26 DIAGNOSIS — B96.89 ACUTE BACTERIAL SINUSITIS: Primary | ICD-10-CM

## 2022-10-26 DIAGNOSIS — J01.90 ACUTE BACTERIAL SINUSITIS: Primary | ICD-10-CM

## 2022-10-26 PROCEDURE — 99213 OFFICE O/P EST LOW 20 MIN: CPT | Performed by: FAMILY MEDICINE

## 2022-10-26 PROCEDURE — G8427 DOCREV CUR MEDS BY ELIG CLIN: HCPCS | Performed by: FAMILY MEDICINE

## 2022-10-26 RX ORDER — AMOXICILLIN AND CLAVULANATE POTASSIUM 875; 125 MG/1; MG/1
1 TABLET, FILM COATED ORAL 2 TIMES DAILY
Qty: 14 TABLET | Refills: 0 | Status: SHIPPED | OUTPATIENT
Start: 2022-10-26 | End: 2022-11-02

## 2022-10-26 NOTE — PROGRESS NOTES
10/26/2022    This is a 25 y.o. male   Chief Complaint   Patient presents with    URI     Pt states this started x2 weeks ago, he is having congestion, sinus pressure, slight cough, ear pressure, fatigue. Blowing out green. Has been taking day quil and night quil. He stopped and is taking mucinex     HPI    Virtual visit for not feeling well. Vernon Necessary reports that symptoms been present for the past 2 weeks or so. He is having congestion, sinus pain, and fatigue. He notes yellow-green nasal discharge. He has been trying DayQuil and NyQuil without significant relief of symptoms. When his symptoms began a couple of weeks ago, he felt it was a common cold. They have progressed since that time    Review of Systems     Current Outpatient Medications   Medication Sig Dispense Refill    amoxicillin-clavulanate (AUGMENTIN) 875-125 MG per tablet Take 1 tablet by mouth 2 times daily for 7 days 14 tablet 0     No current facility-administered medications for this visit. There were no vitals taken for this visit. Physical Exam    Wt Readings from Last 3 Encounters:   08/09/22 131 lb (59.4 kg)   08/02/22 131 lb 3.2 oz (59.5 kg)   06/07/22 122 lb (55.3 kg)       BP Readings from Last 3 Encounters:   08/09/22 110/64   06/07/22 114/70   05/09/22 110/72         Assessment/Plan:  1. Acute bacterial sinusitis    Based on duration and severity this warrants treatment with antibiotics. Augmentin sent in. Reviewed potential side effects. Discussed over-the-counter medications to take along with this. Call if worsening or failing to improve    [facility], was evaluated through a synchronous (real-time) audio-video encounter. The patient (or guardian if applicable) is aware that this is a billable service, which includes applicable co-pays. This Virtual Visit was conducted with patient's (and/or legal guardian's) consent.  The visit was conducted pursuant to the emergency declaration under the [address] and the [employer] Act, [address] [facility] waiver authority and the [employer] and for; to (do) Centers General Act. Patient identification was verified, and a caregiver was present when appropriate. The patient was located at Home: [address] [place] [address]. Provider was located at [place] [address] (Appt Dept): [address] [address]. Total time spent for this encounter: Not billed by time    --Haider Jay MD on 10/27/2022 at 7:56 AM    An electronic signature was used to authenticate this note.

## 2023-05-18 ENCOUNTER — PATIENT MESSAGE (OUTPATIENT)
Dept: FAMILY MEDICINE CLINIC | Age: 23
End: 2023-05-18

## 2023-05-19 NOTE — TELEPHONE ENCOUNTER
Ora Sutton MA 5/19/2023 7:57 AM EDT      ----- Message -----  From: Mike Brown  Sent: 5/18/2023 5:40 PM EDT  To: [facility]  Subject: Allergies     Hey doc, Im messaging you because my seasonal allergies have been pretty bad lately where the allergy meds I take dont work. I also use a nasal spray that only works sometimes. Didnt know if you would like me to come in for an evaluation. Thanks.

## 2023-06-30 ENCOUNTER — OFFICE VISIT (OUTPATIENT)
Dept: FAMILY MEDICINE CLINIC | Age: 23
End: 2023-06-30
Payer: COMMERCIAL

## 2023-06-30 VITALS
DIASTOLIC BLOOD PRESSURE: 70 MMHG | SYSTOLIC BLOOD PRESSURE: 116 MMHG | OXYGEN SATURATION: 96 % | HEART RATE: 78 BPM | HEIGHT: 65 IN | WEIGHT: 135 LBS | RESPIRATION RATE: 14 BRPM | BODY MASS INDEX: 22.49 KG/M2

## 2023-06-30 DIAGNOSIS — F90.0 ATTENTION DEFICIT HYPERACTIVITY DISORDER (ADHD), PREDOMINANTLY INATTENTIVE TYPE: Primary | ICD-10-CM

## 2023-06-30 PROCEDURE — G8427 DOCREV CUR MEDS BY ELIG CLIN: HCPCS | Performed by: FAMILY MEDICINE

## 2023-06-30 PROCEDURE — 1036F TOBACCO NON-USER: CPT | Performed by: FAMILY MEDICINE

## 2023-06-30 PROCEDURE — G8420 CALC BMI NORM PARAMETERS: HCPCS | Performed by: FAMILY MEDICINE

## 2023-06-30 PROCEDURE — 99214 OFFICE O/P EST MOD 30 MIN: CPT | Performed by: FAMILY MEDICINE

## 2023-06-30 RX ORDER — ATOMOXETINE 25 MG/1
50 CAPSULE ORAL DAILY
Qty: 60 CAPSULE | Refills: 1 | Status: SHIPPED | OUTPATIENT
Start: 2023-06-30

## 2023-06-30 ASSESSMENT — PATIENT HEALTH QUESTIONNAIRE - PHQ9
SUM OF ALL RESPONSES TO PHQ QUESTIONS 1-9: 0
3. TROUBLE FALLING OR STAYING ASLEEP: 0
2. FEELING DOWN, DEPRESSED OR HOPELESS: 0
4. FEELING TIRED OR HAVING LITTLE ENERGY: 0
10. IF YOU CHECKED OFF ANY PROBLEMS, HOW DIFFICULT HAVE THESE PROBLEMS MADE IT FOR YOU TO DO YOUR WORK, TAKE CARE OF THINGS AT HOME, OR GET ALONG WITH OTHER PEOPLE: 0
7. TROUBLE CONCENTRATING ON THINGS, SUCH AS READING THE NEWSPAPER OR WATCHING TELEVISION: 0
9. THOUGHTS THAT YOU WOULD BE BETTER OFF DEAD, OR OF HURTING YOURSELF: 0
SUM OF ALL RESPONSES TO PHQ QUESTIONS 1-9: 0
6. FEELING BAD ABOUT YOURSELF - OR THAT YOU ARE A FAILURE OR HAVE LET YOURSELF OR YOUR FAMILY DOWN: 0
8. MOVING OR SPEAKING SO SLOWLY THAT OTHER PEOPLE COULD HAVE NOTICED. OR THE OPPOSITE, BEING SO FIGETY OR RESTLESS THAT YOU HAVE BEEN MOVING AROUND A LOT MORE THAN USUAL: 0
SUM OF ALL RESPONSES TO PHQ QUESTIONS 1-9: 0
5. POOR APPETITE OR OVEREATING: 0
SUM OF ALL RESPONSES TO PHQ QUESTIONS 1-9: 0
1. LITTLE INTEREST OR PLEASURE IN DOING THINGS: 0
SUM OF ALL RESPONSES TO PHQ9 QUESTIONS 1 & 2: 0

## 2023-07-23 NOTE — LETTER
[facility]  [address] [place] [address] [address] [address]  Phone: [phone]  Fax: [phone]    Emelyn Garcia        July 17, 2020     Patient: Jarrod De Dios   YOB: 2000   Date of Visit: 7/17/2020       To Whom It May Concern: It is my medical opinion that [facility] may return to full duty immediately with no restrictions. If you have any questions or concerns, please don't hesitate to call.     Sincerely,          Manuel Rivas
No - the patient is unable to be screened due to medical condition

## 2023-08-07 RX ORDER — ATOMOXETINE 25 MG/1
50 CAPSULE ORAL DAILY
Qty: 60 CAPSULE | Refills: 1 | Status: SHIPPED | OUTPATIENT
Start: 2023-08-07

## 2023-08-12 ENCOUNTER — PATIENT MESSAGE (OUTPATIENT)
Dept: FAMILY MEDICINE CLINIC | Age: 23
End: 2023-08-12

## 2023-09-26 SDOH — ECONOMIC STABILITY: HOUSING INSECURITY
IN THE LAST 12 MONTHS, WAS THERE A TIME WHEN YOU DID NOT HAVE A STEADY PLACE TO SLEEP OR SLEPT IN A SHELTER (INCLUDING NOW)?: NO

## 2023-09-26 SDOH — ECONOMIC STABILITY: INCOME INSECURITY: HOW HARD IS IT FOR YOU TO PAY FOR THE VERY BASICS LIKE FOOD, HOUSING, MEDICAL CARE, AND HEATING?: NOT VERY HARD

## 2023-09-26 SDOH — ECONOMIC STABILITY: TRANSPORTATION INSECURITY
IN THE PAST 12 MONTHS, HAS LACK OF TRANSPORTATION KEPT YOU FROM MEETINGS, WORK, OR FROM GETTING THINGS NEEDED FOR DAILY LIVING?: NO

## 2023-09-26 SDOH — ECONOMIC STABILITY: FOOD INSECURITY: WITHIN THE PAST 12 MONTHS, YOU WORRIED THAT YOUR FOOD WOULD RUN OUT BEFORE YOU GOT MONEY TO BUY MORE.: NEVER TRUE

## 2023-09-26 SDOH — ECONOMIC STABILITY: FOOD INSECURITY: WITHIN THE PAST 12 MONTHS, THE FOOD YOU BOUGHT JUST DIDN'T LAST AND YOU DIDN'T HAVE MONEY TO GET MORE.: NEVER TRUE

## 2023-10-05 ENCOUNTER — OFFICE VISIT (OUTPATIENT)
Dept: FAMILY MEDICINE CLINIC | Age: 23
End: 2023-10-05
Payer: COMMERCIAL

## 2023-10-05 VITALS
RESPIRATION RATE: 18 BRPM | HEIGHT: 65 IN | BODY MASS INDEX: 21.19 KG/M2 | WEIGHT: 127.2 LBS | SYSTOLIC BLOOD PRESSURE: 106 MMHG | OXYGEN SATURATION: 98 % | HEART RATE: 87 BPM | TEMPERATURE: 98.2 F | DIASTOLIC BLOOD PRESSURE: 50 MMHG

## 2023-10-05 DIAGNOSIS — F41.9 ANXIETY: ICD-10-CM

## 2023-10-05 DIAGNOSIS — M79.10 MYALGIA: ICD-10-CM

## 2023-10-05 DIAGNOSIS — R09.81 SINUS CONGESTION: Primary | ICD-10-CM

## 2023-10-05 DIAGNOSIS — F90.0 ATTENTION DEFICIT HYPERACTIVITY DISORDER (ADHD), PREDOMINANTLY INATTENTIVE TYPE: ICD-10-CM

## 2023-10-05 LAB
BASOPHILS # BLD: 0 K/UL (ref 0–0.2)
BASOPHILS NFR BLD: 0.4 %
CRP SERPL-MCNC: <3 MG/L (ref 0–5.1)
DEPRECATED RDW RBC AUTO: 12.9 % (ref 12.4–15.4)
EOSINOPHIL # BLD: 0.1 K/UL (ref 0–0.6)
EOSINOPHIL NFR BLD: 1.7 %
ERYTHROCYTE [SEDIMENTATION RATE] IN BLOOD BY WESTERGREN METHOD: 5 MM/HR (ref 0–15)
HCT VFR BLD AUTO: 44.7 % (ref 40.5–52.5)
HGB BLD-MCNC: 15.6 G/DL (ref 13.5–17.5)
LYMPHOCYTES # BLD: 2.5 K/UL (ref 1–5.1)
LYMPHOCYTES NFR BLD: 43.7 %
MCH RBC QN AUTO: 30.9 PG (ref 26–34)
MCHC RBC AUTO-ENTMCNC: 34.9 G/DL (ref 31–36)
MCV RBC AUTO: 88.6 FL (ref 80–100)
MONOCYTES # BLD: 0.8 K/UL (ref 0–1.3)
MONOCYTES NFR BLD: 13.4 %
NEUTROPHILS # BLD: 2.3 K/UL (ref 1.7–7.7)
NEUTROPHILS NFR BLD: 40.8 %
PLATELET # BLD AUTO: 177 K/UL (ref 135–450)
PMV BLD AUTO: 9.2 FL (ref 5–10.5)
RBC # BLD AUTO: 5.04 M/UL (ref 4.2–5.9)
WBC # BLD AUTO: 5.7 K/UL (ref 4–11)

## 2023-10-05 PROCEDURE — G8427 DOCREV CUR MEDS BY ELIG CLIN: HCPCS | Performed by: FAMILY MEDICINE

## 2023-10-05 PROCEDURE — 99214 OFFICE O/P EST MOD 30 MIN: CPT | Performed by: FAMILY MEDICINE

## 2023-10-05 PROCEDURE — 1036F TOBACCO NON-USER: CPT | Performed by: FAMILY MEDICINE

## 2023-10-05 PROCEDURE — G8420 CALC BMI NORM PARAMETERS: HCPCS | Performed by: FAMILY MEDICINE

## 2023-10-05 PROCEDURE — G8484 FLU IMMUNIZE NO ADMIN: HCPCS | Performed by: FAMILY MEDICINE

## 2023-10-05 SDOH — ECONOMIC STABILITY: INCOME INSECURITY: HOW HARD IS IT FOR YOU TO PAY FOR THE VERY BASICS LIKE FOOD, HOUSING, MEDICAL CARE, AND HEATING?: NOT HARD AT ALL

## 2023-10-05 SDOH — ECONOMIC STABILITY: FOOD INSECURITY: WITHIN THE PAST 12 MONTHS, THE FOOD YOU BOUGHT JUST DIDN'T LAST AND YOU DIDN'T HAVE MONEY TO GET MORE.: NEVER TRUE

## 2023-10-05 SDOH — ECONOMIC STABILITY: FOOD INSECURITY: WITHIN THE PAST 12 MONTHS, YOU WORRIED THAT YOUR FOOD WOULD RUN OUT BEFORE YOU GOT MONEY TO BUY MORE.: NEVER TRUE

## 2023-10-05 ASSESSMENT — ENCOUNTER SYMPTOMS
SHORTNESS OF BREATH: 0
SINUS PRESSURE: 1
ABDOMINAL DISTENTION: 0
COUGH: 0
VOMITING: 0
DIARRHEA: 0
SORE THROAT: 1
NAUSEA: 0

## 2023-10-06 LAB
ALBUMIN SERPL-MCNC: 5.2 G/DL (ref 3.4–5)
ALBUMIN/GLOB SERPL: 2.2 {RATIO} (ref 1.1–2.2)
ALP SERPL-CCNC: 78 U/L (ref 40–129)
ALT SERPL-CCNC: 14 U/L (ref 10–40)
ANA SER QL IA: NEGATIVE
ANION GAP SERPL CALCULATED.3IONS-SCNC: 16 MMOL/L (ref 3–16)
AST SERPL-CCNC: 16 U/L (ref 15–37)
BILIRUB SERPL-MCNC: 0.5 MG/DL (ref 0–1)
BUN SERPL-MCNC: 8 MG/DL (ref 7–20)
CALCIUM SERPL-MCNC: 9.4 MG/DL (ref 8.3–10.6)
CHLORIDE SERPL-SCNC: 100 MMOL/L (ref 99–110)
CO2 SERPL-SCNC: 25 MMOL/L (ref 21–32)
CREAT SERPL-MCNC: 0.9 MG/DL (ref 0.9–1.3)
GFR SERPLBLD CREATININE-BSD FMLA CKD-EPI: >60 ML/MIN/{1.73_M2}
GLUCOSE SERPL-MCNC: 96 MG/DL (ref 70–99)
POTASSIUM SERPL-SCNC: 4.4 MMOL/L (ref 3.5–5.1)
PROT SERPL-MCNC: 7.6 G/DL (ref 6.4–8.2)
SODIUM SERPL-SCNC: 141 MMOL/L (ref 136–145)

## 2023-10-07 LAB
B BURGDOR IGG SER QL IB: NEGATIVE
B BURGDOR IGM SER QL IB: NEGATIVE

## 2024-01-06 ASSESSMENT — PATIENT HEALTH QUESTIONNAIRE - PHQ9
9. THOUGHTS THAT YOU WOULD BE BETTER OFF DEAD, OR OF HURTING YOURSELF: 0
2. FEELING DOWN, DEPRESSED OR HOPELESS: 0
4. FEELING TIRED OR HAVING LITTLE ENERGY: 1
SUM OF ALL RESPONSES TO PHQ QUESTIONS 1-9: 4
SUM OF ALL RESPONSES TO PHQ QUESTIONS 1-9: 4
5. POOR APPETITE OR OVEREATING: SEVERAL DAYS
8. MOVING OR SPEAKING SO SLOWLY THAT OTHER PEOPLE COULD HAVE NOTICED. OR THE OPPOSITE, BEING SO FIGETY OR RESTLESS THAT YOU HAVE BEEN MOVING AROUND A LOT MORE THAN USUAL: 0
SUM OF ALL RESPONSES TO PHQ9 QUESTIONS 1 & 2: 0
9. THOUGHTS THAT YOU WOULD BE BETTER OFF DEAD, OR OF HURTING YOURSELF: NOT AT ALL
5. POOR APPETITE OR OVEREATING: 1
1. LITTLE INTEREST OR PLEASURE IN DOING THINGS: NOT AT ALL
3. TROUBLE FALLING OR STAYING ASLEEP: SEVERAL DAYS
8. MOVING OR SPEAKING SO SLOWLY THAT OTHER PEOPLE COULD HAVE NOTICED. OR THE OPPOSITE - BEING SO FIDGETY OR RESTLESS THAT YOU HAVE BEEN MOVING AROUND A LOT MORE THAN USUAL: NOT AT ALL
6. FEELING BAD ABOUT YOURSELF - OR THAT YOU ARE A FAILURE OR HAVE LET YOURSELF OR YOUR FAMILY DOWN: 0
SUM OF ALL RESPONSES TO PHQ QUESTIONS 1-9: 4
4. FEELING TIRED OR HAVING LITTLE ENERGY: SEVERAL DAYS
7. TROUBLE CONCENTRATING ON THINGS, SUCH AS READING THE NEWSPAPER OR WATCHING TELEVISION: SEVERAL DAYS
7. TROUBLE CONCENTRATING ON THINGS, SUCH AS READING THE NEWSPAPER OR WATCHING TELEVISION: 1
SUM OF ALL RESPONSES TO PHQ QUESTIONS 1-9: 4
10. IF YOU CHECKED OFF ANY PROBLEMS, HOW DIFFICULT HAVE THESE PROBLEMS MADE IT FOR YOU TO DO YOUR WORK, TAKE CARE OF THINGS AT HOME, OR GET ALONG WITH OTHER PEOPLE: 1
3. TROUBLE FALLING OR STAYING ASLEEP: 1
6. FEELING BAD ABOUT YOURSELF - OR THAT YOU ARE A FAILURE OR HAVE LET YOURSELF OR YOUR FAMILY DOWN: NOT AT ALL
1. LITTLE INTEREST OR PLEASURE IN DOING THINGS: 0
2. FEELING DOWN, DEPRESSED OR HOPELESS: NOT AT ALL
SUM OF ALL RESPONSES TO PHQ QUESTIONS 1-9: 4
10. IF YOU CHECKED OFF ANY PROBLEMS, HOW DIFFICULT HAVE THESE PROBLEMS MADE IT FOR YOU TO DO YOUR WORK, TAKE CARE OF THINGS AT HOME, OR GET ALONG WITH OTHER PEOPLE: SOMEWHAT DIFFICULT

## 2024-01-09 ENCOUNTER — OFFICE VISIT (OUTPATIENT)
Dept: FAMILY MEDICINE CLINIC | Age: 24
End: 2024-01-09
Payer: COMMERCIAL

## 2024-01-09 VITALS
DIASTOLIC BLOOD PRESSURE: 60 MMHG | OXYGEN SATURATION: 97 % | HEIGHT: 65 IN | TEMPERATURE: 98.6 F | RESPIRATION RATE: 18 BRPM | BODY MASS INDEX: 20.83 KG/M2 | SYSTOLIC BLOOD PRESSURE: 90 MMHG | HEART RATE: 86 BPM | WEIGHT: 125 LBS

## 2024-01-09 DIAGNOSIS — R31.29 MICROSCOPIC HEMATURIA: ICD-10-CM

## 2024-01-09 DIAGNOSIS — R30.0 DYSURIA: Primary | ICD-10-CM

## 2024-01-09 LAB
BILIRUBIN, POC: NEGATIVE
BLOOD URINE, POC: NORMAL
CLARITY, POC: CLEAR
COLOR, POC: YELLOW
GLUCOSE URINE, POC: NORMAL
KETONES, POC: NEGATIVE
LEUKOCYTE EST, POC: NEGATIVE
NITRITE, POC: NEGATIVE
PH, POC: 7
PROTEIN, POC: NORMAL
REASON FOR REJECTION: NORMAL
REJECTED TEST: NORMAL
SPECIFIC GRAVITY, POC: 1.02
UROBILINOGEN, POC: NORMAL

## 2024-01-09 PROCEDURE — G8427 DOCREV CUR MEDS BY ELIG CLIN: HCPCS | Performed by: FAMILY MEDICINE

## 2024-01-09 PROCEDURE — 81002 URINALYSIS NONAUTO W/O SCOPE: CPT | Performed by: FAMILY MEDICINE

## 2024-01-09 PROCEDURE — 99214 OFFICE O/P EST MOD 30 MIN: CPT | Performed by: FAMILY MEDICINE

## 2024-01-09 PROCEDURE — G8420 CALC BMI NORM PARAMETERS: HCPCS | Performed by: FAMILY MEDICINE

## 2024-01-09 PROCEDURE — 1036F TOBACCO NON-USER: CPT | Performed by: FAMILY MEDICINE

## 2024-01-09 PROCEDURE — G8484 FLU IMMUNIZE NO ADMIN: HCPCS | Performed by: FAMILY MEDICINE

## 2024-01-09 RX ORDER — M-VIT,TX,IRON,MINS/CALC/FOLIC 27MG-0.4MG
1 TABLET ORAL DAILY
COMMUNITY

## 2024-01-09 NOTE — PROGRESS NOTES
Vitamins-Minerals (THERAPEUTIC MULTIVITAMIN-MINERALS) tablet Take 1 tablet by mouth daily Yes Provider, MD Tim   dextroamphetamine (DEXEDRINE) 5 MG extended release capsule Take 1 capsule by mouth daily for 30 days. Max Daily Amount: 5 mg  Jesse Winters MD        Social History     Tobacco Use    Smoking status: Never    Smokeless tobacco: Never   Vaping Use    Vaping Use: Never used   Substance Use Topics    Alcohol use: No    Drug use: Not Currently       Review of Systems    Physical Exam  Vitals and nursing note reviewed.   Constitutional:       General: He is not in acute distress.     Appearance: Normal appearance. He is well-developed. He is not diaphoretic.   Cardiovascular:      Rate and Rhythm: Normal rate and regular rhythm.      Pulses: Normal pulses.      Heart sounds: Normal heart sounds. No murmur heard.  Pulmonary:      Effort: Pulmonary effort is normal. No respiratory distress.      Breath sounds: Normal breath sounds. No wheezing or rales.   Abdominal:      General: Abdomen is flat. Bowel sounds are normal.      Palpations: Abdomen is soft.      Tenderness: There is no abdominal tenderness.   Genitourinary:     Penis: Normal.       Testes: Normal.      Comments: No hernias or inguinal masses  Musculoskeletal:      Cervical back: Normal range of motion.      Right lower leg: No edema.      Left lower leg: No edema.   Skin:     General: Skin is warm and dry.   Neurological:      General: No focal deficit present.      Mental Status: He is alert and oriented to person, place, and time. Mental status is at baseline.   Psychiatric:         Mood and Affect: Mood normal.         Behavior: Behavior normal.         Thought Content: Thought content normal.         Judgment: Judgment normal.         ASSESSMENT/PLAN:    1. Dysuria  - cause not clear.  Check cultures and for crystals.  I asked him to eliminate carbonated beverages for a couple months as a trial.  Recheck in 2 mo with repeat ua

## 2024-01-10 ENCOUNTER — TELEPHONE (OUTPATIENT)
Dept: FAMILY MEDICINE CLINIC | Age: 24
End: 2024-01-10

## 2024-01-10 DIAGNOSIS — R30.0 DYSURIA: ICD-10-CM

## 2024-01-10 LAB
BACTERIA UR CULT: NORMAL
BACTERIA URNS QL MICRO: ABNORMAL /HPF
BILIRUB UR QL STRIP.AUTO: NEGATIVE
CLARITY UR: CLEAR
COLOR UR: YELLOW
EPI CELLS #/AREA URNS AUTO: 0 /HPF (ref 0–5)
GLUCOSE UR STRIP.AUTO-MCNC: NEGATIVE MG/DL
HGB UR QL STRIP.AUTO: NEGATIVE
HYALINE CASTS #/AREA URNS AUTO: 0 /LPF (ref 0–8)
KETONES UR STRIP.AUTO-MCNC: NEGATIVE MG/DL
LEUKOCYTE ESTERASE UR QL STRIP.AUTO: NEGATIVE
N GONORRHOEA DNA UR QL NAA+PROBE: NEGATIVE
NITRITE UR QL STRIP.AUTO: NEGATIVE
PH UR STRIP.AUTO: 8.5 [PH] (ref 5–8)
PROT UR STRIP.AUTO-MCNC: NEGATIVE MG/DL
RBC CLUMPS #/AREA URNS AUTO: 1 /HPF (ref 0–4)
SP GR UR STRIP.AUTO: 1.01 (ref 1–1.03)
UA DIPSTICK W REFLEX MICRO PNL UR: ABNORMAL
URN SPEC COLLECT METH UR: ABNORMAL
UROBILINOGEN UR STRIP-ACNC: 0.2 E.U./DL
WBC #/AREA URNS AUTO: 0 /HPF (ref 0–5)

## 2024-01-10 NOTE — TELEPHONE ENCOUNTER
Patient called and will stop in to redo his urine.  Needs to go into a white tube( no additive).  Patient needs a Urine Microscopic ordered.

## 2024-01-12 LAB — C TRACH DNA UR QL NAA+PROBE: NEGATIVE

## 2024-03-07 ENCOUNTER — OFFICE VISIT (OUTPATIENT)
Dept: FAMILY MEDICINE CLINIC | Age: 24
End: 2024-03-07
Payer: COMMERCIAL

## 2024-03-07 VITALS
DIASTOLIC BLOOD PRESSURE: 56 MMHG | HEART RATE: 79 BPM | WEIGHT: 134 LBS | RESPIRATION RATE: 18 BRPM | SYSTOLIC BLOOD PRESSURE: 102 MMHG | TEMPERATURE: 98.5 F | BODY MASS INDEX: 22.3 KG/M2 | OXYGEN SATURATION: 98 %

## 2024-03-07 DIAGNOSIS — G47.26 SLEEP DISORDER, SHIFT WORK: ICD-10-CM

## 2024-03-07 DIAGNOSIS — F90.0 ATTENTION DEFICIT HYPERACTIVITY DISORDER (ADHD), PREDOMINANTLY INATTENTIVE TYPE: ICD-10-CM

## 2024-03-07 DIAGNOSIS — F32.5 MAJOR DEPRESSIVE DISORDER WITH SINGLE EPISODE, IN FULL REMISSION (HCC): Primary | ICD-10-CM

## 2024-03-07 PROCEDURE — G8420 CALC BMI NORM PARAMETERS: HCPCS | Performed by: FAMILY MEDICINE

## 2024-03-07 PROCEDURE — G8484 FLU IMMUNIZE NO ADMIN: HCPCS | Performed by: FAMILY MEDICINE

## 2024-03-07 PROCEDURE — 1036F TOBACCO NON-USER: CPT | Performed by: FAMILY MEDICINE

## 2024-03-07 PROCEDURE — 99214 OFFICE O/P EST MOD 30 MIN: CPT | Performed by: FAMILY MEDICINE

## 2024-03-07 PROCEDURE — G8427 DOCREV CUR MEDS BY ELIG CLIN: HCPCS | Performed by: FAMILY MEDICINE

## 2024-03-07 RX ORDER — TRAZODONE HYDROCHLORIDE 50 MG/1
50 TABLET ORAL NIGHTLY PRN
Qty: 90 TABLET | Refills: 0 | Status: SHIPPED | OUTPATIENT
Start: 2024-03-07

## 2024-03-07 NOTE — PROGRESS NOTES
depressive disorder with single episode, in full remission (HCC)  - Stable; continue to observe without antidepressant therapy.  Affirmed his healthy lifestyle choices.    2. 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 Sleep disorder, shift work  - discussed options for mgmt; try traz on nights he is not on call.      Return in about 6 months (around 9/7/2024) for follow up depression.    An  WowOwowignature was used to authenticate this note.    --Jesse Winters MD on 3/7/2024 at 2:26 PM

## 2024-09-05 NOTE — PLAN OF CARE
[address] [facility] [address]  Phone: [phone]   Fax:     [phone]                                                       Physical Therapy Certification    Dear Referring Provider (secondary): Dr. Patricio Zuniga, I think this patient may benefit from dry needling and may attempt it at some point. .  Please sign the following if you are in agreement with this. If you have any reservations or questions please contact me at [phone]  Thanks, Sincerely, Tony Peters PT. I loco also try BFR. We had the pleasure of evaluating the following patient for physical therapy services at [facility]. A summary of our findings can be found in the initial assessment below. This includes our plan of care. If you have any questions or concerns regarding these findings, please do not hesitate to contact me at the office phone number checked above. Thank you for the referral.       Physician Signature:_______________________________Date:__________________  By signing above (or electronic signature), therapists plan is approved by physician              Patient: Guevara Sánchez   : 2000   MRN: [id number]  Referring Physician: Referring Provider (secondary): Dr. Patricio Zuniga      Evaluation Date: 2022      Medical Diagnosis Information:  Diagnosis: O69.926R (ICD-10-CM) - Sprain of right shoulder, unspecified shoulder sprain type, initial encounter   Treatment Diagnosis: decreased abilty to use right ue and work                                         Insurance information: PT Insurance Information: [facility]    Precautions/ Contra-indications:  Latex Allergy:   [x]  NO      []YES  Preferred Language for Healthcare:   [x]English       []other:    C-SSRS Triggered by Intake questionnaire (Past 2 wk assessment ):   [x] No, Questionnaire did not trigger screening.    [] bilaterally   []Other:    Joint mobility:    []Normal    []Hypo   [x]Hyper    Orthopedic Special Tests:  Neer, Cross Body, and Hornblower- painful, Slight pain with drop arm                        [x] Patient history, allergies, meds reviewed. Medical chart reviewed. See intake form. Review Of Systems (ROS):  [x]Performed Review of systems (Integumentary, CardioPulmonary, Neurological) by intake and observation. Intake form has been scanned into medical record. Patient has been instructed to contact their primary care physician regarding ROS issues if not already being addressed at this time.       Co-morbidities/Complexities (which will affect course of rehabilitation):   []None           Arthritic conditions   []Rheumatoid arthritis (M05.9)  []Osteoarthritis (M19.91)   Cardiovascular conditions   []Hypertension (I10)  []Hyperlipidemia (E78.5)  []Angina pectoris (I20)  []Atherosclerosis (I70)  []CVA Musculoskeletal conditions   []Disc pathology   []Congenital spine pathologies   []Prior surgical intervention  []Osteoporosis (M81.8)  []Osteopenia (M85.8)   Endocrine conditions   []Hypothyroid (E03.9)  []Hyperthyroid Gastrointestinal conditions   []Constipation (P74.32)   Metabolic conditions   []Morbid obesity (E66.01)  []Diabetes type 1(E10.65) or 2 (E11.65)   []Neuropathy (G60.9)     Pulmonary conditions   [x]Asthma (J45)  []Coughing   []COPD (J44.9)   Psychological Disorders  []Anxiety (F41.9)  []Depression (F32.9)   []Other:   [x]Other:   Adhd, ibs       Barriers to/and or personal factors that will affect rehab potential:              []Age  []Sex   []Smoker              []Motivation/Lack of Motivation                        []Co-Morbidities              []Cognitive Function, education/learning barriers              []Environmental, home barriers              []profession/work barriers  []past PT/medical experience  []other:  Justification:     Falls Risk Assessment (30 days):   [x] Falls Risk assessed and no intervention required. [] Falls Risk assessed and Patient requires intervention due to being higher risk   TUG score (>12s at risk):     [] Falls education provided, including         ASSESSMENT:    Functional Impairments:     []Noted spinal or UE joint hypomobility   [x]Noted spinal or UE joint hypermobility   []Decreased spinal/UE functional ROM   []Abnormal reflexes/sensation/myotomal/dermatomal deficits   [x]Decreased RC/scapular/core strength and neuromuscular control    []Decreased UE functional strength   []other:      Functional Activity Limitations (from functional questionnaire and intake)   []Reduced ability to tolerate prolonged functional positions   []Reduced ability or difficulty with changes of positions or transfers between positions   []Reduced ability to maintain good posture and demonstrate good body mechanics with sitting, bending, and lifting   [] Reduced ability or tolerance with driving and/or computer work   [x]Reduced ability to perform lifting, reaching, carrying tasks   [x]Reduced ability to reach behind back   []Reduced ability to sleep    [x]Reduced ability to tolerate any impact through UE or spine   [x]Reduced ability to  or hold objects   [x]Reduced ability to throw or toss an object   []other:    Participation Restrictions   []Reduced participation in self care activities   [x]Reduced participation in home management activities   [x]Reduced participation in work activities   []Reduced participation in social activities. [x]Reduced participation in sport/recreational activities. Classification/Subgrouping:   []signs/symptoms consistent with post-surgical status including decreased ROM, strength and function.     [x]signs/symptoms consistent with joint sprain/strain    []signs/symptoms consistent with shoulder impingement (internal, external, primary or secondary)   []signs/symptoms consistent with shoulder/elbow/wrist tendinopathy   []Signs/symptoms consistent with Rotator cuff tear   []sign/symptoms consistent with labral tear   []signs/symptoms consistent with rib dysfunction   []signs/symptoms consistent with postural dysfunction   []signs/symptoms consistent with Glenohumeral IR Deficit - <45 degrees   []signs/symptoms consistent with facet dysfunction of cervical/thoracic spine   [x]signs/symptoms consistent with pathology which may benefit from Dry Needling   []signs/symptoms which may limit the use of advanced manual therapy techniques: (Elevated CV risk profile, recent trauma, intolerance to end range positions, prior TIA, visual issues, UE neurological compromise )     Prognosis/Rehab Potential:      []Excellent   [x]Good    []Fair   []Poor    Tolerance of evaluation/treatment:    []Excellent   [x]Good    []Fair   []Poor    Physical Therapy Evaluation Complexity Justification  [x] A history of present problem with:  [x] no personal factors and/or comorbidities that impact the plan of care;  []1-2 personal factors and/or comorbidities that impact the plan of care  []3 personal factors and/or comorbidities that impact the plan of care  [x] An examination of body systems using standardized tests and measures addressing any of the following: body structures and functions (impairments), activity limitations, and/or participation restrictions;:  [] a total of 1-2 or more elements   [x] a total of 3 or more elements   [] a total of 4 or more elements   [x] A clinical presentation with:  [] stable and/or uncomplicated characteristics   [x] evolving clinical presentation with changing characteristics  [] unstable and unpredictable characteristics;   [x] Clinical decision making of [] low, [] moderate, [] high complexity using standardized patient assessment instrument and/or measurable assessment of functional outcome.     [x] EVAL (LOW) 02664 (typically 20 minutes face-to-face)  [] EVAL (MOD) 37555 (typically 30 minutes face-to-face)  [] EVAL (HIGH) 63681 (typically 45 minutes face-to-face)  [] RE-EVAL     PLAN: UE arom, aarom, prom, strengthening, scapular strength, myofascial release, joint mobs to gh, sc, ac, scapular thoracic, modalities for pain, hep    Frequency/Duration:  1-2 days per week for 8 Weeks:12 visits  Interventions:  [x]  Therapeutic exercise including: strength training, ROM, for scapula, core and Upper extremity, including postural re-education. [x]  NMR activation and proprioception for UE, periscapular and RC muscles and Core, including postural re-education. [x]  Manual therapy as indicated for shoulder, scapula, spine and associated soft tissue including: Dry Needling/IASTM, STM, PROM, Gr I-IV mobilizations, manipulation. [x] Modalities as needed that may include: thermal agents, E-stim, Biofeedback, US, iontophoresis as indicated  [x] Patient education on joint protection, postural re-education, activity modification, progression of HEP. HEP instruction: (see scanned forms)    GOALS:  Patient stated goal: \" I want to return to full function and work \"  [] Progressing: [] Met: [] Not Met: [] Adjusted    Therapist goals for Patient:   Short Term Goals: To be achieved in: 2 weeks  1. Independent in HEP and progression per patient tolerance, in order to prevent re-injury. [] Progressing: [] Met: [] Not Met: [] Adjusted  2. Patient will have a decrease in pain to facilitate improvement in movement, function, and ADLs as indicated by Functional Deficits. [] Progressing: [] Met: [] Not Met: [] Adjusted    Long Term Goals: To be achieved in: 8 weeks  1. Increase FOTO functional outcome score from 62 to 80  to assist with reaching prior level of function. [] Progressing: [] Met: [] Not Met: [] Adjusted  2. Patient will demonstrate increased AROM to wfl to allow for proper joint functioning as indicated by Functional Deficits. [] Progressing: [] Met: [] Not Met: [] Adjusted  3.  Patient will demonstrate an increase in NM recruitment/activation and overall [address] and scapular strength to within n5lbs HHD or WNL for proper functional mobility as indicated by patients Functional Deficits. [] Progressing: [] Met: [] Not Met: [] Adjusted  4. Patient will return to 100 %  activities without increased symptoms or restriction. [] Progressing: [] Met: [] Not Met: [] Adjusted  5. I want to return to work      [] Progressing: [] Met: [] Not Met: [] Adjusted    Electronically signed by:  Cathi Figueroa PT      Note: If patient does not return for scheduled/recommended follow up visits, this note will serve as a discharge from care along with the most recent update on progress. code: stroke

## 2024-09-06 ENCOUNTER — OFFICE VISIT (OUTPATIENT)
Dept: FAMILY MEDICINE CLINIC | Age: 24
End: 2024-09-06

## 2024-09-06 VITALS
DIASTOLIC BLOOD PRESSURE: 68 MMHG | OXYGEN SATURATION: 98 % | BODY MASS INDEX: 24.53 KG/M2 | HEART RATE: 68 BPM | SYSTOLIC BLOOD PRESSURE: 104 MMHG | WEIGHT: 147.4 LBS | RESPIRATION RATE: 18 BRPM

## 2024-09-06 DIAGNOSIS — F32.5 MAJOR DEPRESSIVE DISORDER WITH SINGLE EPISODE, IN FULL REMISSION (HCC): Primary | ICD-10-CM

## 2024-09-06 DIAGNOSIS — F51.04 PSYCHOPHYSIOLOGICAL INSOMNIA: ICD-10-CM

## 2024-09-06 DIAGNOSIS — J06.9 VIRAL URI: ICD-10-CM

## 2024-09-06 LAB
EXP DATE SOLUTION: NORMAL
EXP DATE SWAB: NORMAL
EXPIRATION DATE: NORMAL
INFLUENZA A RNA, POC: NORMAL
INFLUENZA B RNA, POC: NORMAL
LOT NUMBER POC: NORMAL
LOT NUMBER SOLUTION: NORMAL
LOT NUMBER SWAB: NORMAL
SARS-COV-2 RNA, POC: NEGATIVE
VALID INTERNAL CONTROL: NORMAL

## 2024-09-06 RX ORDER — TRAZODONE HYDROCHLORIDE 50 MG/1
50 TABLET, FILM COATED ORAL NIGHTLY PRN
Qty: 90 TABLET | Refills: 0 | Status: SHIPPED | OUTPATIENT
Start: 2024-09-06

## 2024-09-06 NOTE — PATIENT INSTRUCTIONS
1. Aleve 2 tabs every 12 hrs  2. Sudafed (pseudoephedrine) 12 hour extended release 120mg in the morning  3. Afrin nasal spray at bedtime (12 hours after the dose of Sudafed 12 hour)  4. Extra cough medicine that has an antihistamine (like Nyquil) at bedtime if still having a cough- but it tends to cause sedation, so not to be used prior to work or driving.    Reasons to see a doctor: fever, shortness of breath, significant pain somewhere, any infection lasting longer than a week without improvement.

## 2024-09-06 NOTE — PROGRESS NOTES
2024    Blood pressure 104/68, pulse 68, resp. rate 18, weight 66.9 kg (147 lb 6.4 oz), SpO2 98%.    Tee Barone (:  2000) is a 23 y.o. male, here for evaluation of the following medical concerns:    Chief Complaint   Patient presents with    Follow-up    Depression    Congestion     Runny nose, sneezing, sinus pressure in ears. Started last Saturday.       Here for routine follow up of MDD.  Tee feels life is going well.  Waiting to get in with [employer] fire/ems- so this does create some anxiety.  He is not presently using any meds for mood or sleep presently.  Says sleep is hit and miss.  He struggles to fall asleep or awakens throughout the night 2-3 times a week when sleeping at home.  No nightmares or dreams he recalls.  'I always feel tired'  He never tried trazodone as his insurance was in flux    But denies depressive symptoms.    Talked to a psychologist at work- more for screening. Did not identify significant mental health issues, but some low levels of PTSD. Related to working with the  in accidents.    Reports uri sx also.  X 1 wk.  Had a bad headache the first day.  Sinus pressure, RN, ears full, minimal cough  No f/c  No body aches  No n/v/d  No ill contacts known.    Rx used: afrin once a day, mucinex DM, some sudafed.    Patient Active Problem List   Diagnosis    Irritable bowel syndrome with diarrhea    Closed displaced fracture of middle third of navicular bone of right wrist    Major depressive disorder with single episode, in full remission (HCC)    Anxiety    Attention deficit hyperactivity disorder (ADHD), predominantly inattentive type    Sleep disorder, shift work        Body mass index is 24.53 kg/m².    Wt Readings from Last 3 Encounters:   24 66.9 kg (147 lb 6.4 oz)   24 60.8 kg (134 lb)   24 56.7 kg (125 lb)       BP Readings from Last 3 Encounters:   24 104/68   24 (!) 102/56   24 90/60       No Known Allergies    Prior to

## 2024-09-13 ENCOUNTER — PATIENT MESSAGE (OUTPATIENT)
Dept: FAMILY MEDICINE CLINIC | Age: 24
End: 2024-09-13

## 2024-09-20 ENCOUNTER — PATIENT MESSAGE (OUTPATIENT)
Dept: FAMILY MEDICINE CLINIC | Age: 24
End: 2024-09-20

## 2024-09-20 RX ORDER — DEXTROMETHORPHAN HYDROBROMIDE AND PROMETHAZINE HYDROCHLORIDE 15; 6.25 MG/5ML; MG/5ML
5 SYRUP ORAL 4 TIMES DAILY PRN
Qty: 240 ML | Refills: 0 | Status: SHIPPED | OUTPATIENT
Start: 2024-09-20 | End: 2024-09-27

## 2024-10-03 ENCOUNTER — TELEPHONE (OUTPATIENT)
Dept: ORTHOPEDIC SURGERY | Age: 24
End: 2024-10-03

## 2024-10-03 NOTE — TELEPHONE ENCOUNTER
Mariam Dumont, [facility],  [phone].  She is faxing over release of information form - needs ortho notes from 2022 to present - just asked that I put in note to dr. Lorenz's team as well.

## 2024-10-07 ENCOUNTER — TELEPHONE (OUTPATIENT)
Dept: ORTHOPEDIC SURGERY | Age: 24
End: 2024-10-07

## 2024-10-07 NOTE — TELEPHONE ENCOUNTER
Faxed records for 1/1/2022 to date (Gerda) to [phone] [facility]

## 2024-10-10 ENCOUNTER — PATIENT MESSAGE (OUTPATIENT)
Dept: FAMILY MEDICINE CLINIC | Age: 24
End: 2024-10-10

## 2024-10-10 RX ORDER — PREDNISONE 20 MG/1
20 TABLET ORAL 2 TIMES DAILY
Qty: 10 TABLET | Refills: 0 | Status: SHIPPED | OUTPATIENT
Start: 2024-10-10 | End: 2024-10-15

## 2024-10-10 NOTE — TELEPHONE ENCOUNTER
Patient is still dealing with cough.  Is taking cough med at night but make drowsy during the day.  Anything to be called in to help with daytime cough?

## 2024-10-13 ENCOUNTER — PATIENT MESSAGE (OUTPATIENT)
Dept: FAMILY MEDICINE CLINIC | Age: 24
End: 2024-10-13

## 2024-10-17 NOTE — PATIENT INSTRUCTIONS
Post-Op Assessment Note    Last Filed PACU Vitals:  Vitals Value Taken Time   Temp 98.6 °F (37 °C) 10/17/24 1027   Pulse 84 10/17/24 1027   /78 10/17/24 1027   Resp 18 10/17/24 1027   SpO2 99 % 10/17/24 1027       Modified Mohinder:  Activity: 2 (10/17/2024  9:47 AM)  Respiration: 2 (10/17/2024  9:47 AM)  Circulation: 2 (10/17/2024  9:47 AM)  Consciousness: 1 (10/17/2024  9:47 AM)  Oxygen Saturation: 2 (10/17/2024  9:47 AM)  Modified Mohinder Score: 9 (10/17/2024  9:47 AM)         Pre-Operative Instructions    1. The night before your surgery, unless otherwise instructed, do not eat any food, drink any liquids, chew gum or mints after midnight. Abstain from alcohol for 24 hours prior to surgery. 2. You will be contacted by the Hospital the working day prior to your procedure to confirm your arrival time. 3. Patients under 25years of age must have a parent or legal guardian present to sign their consent and discharge paperwork. 4. On the day of surgery,  you will be seen pre-operatively by an anesthesiologist.     5. If you are having hand surgery, it is recommended that nail polish and acrylic nails be removed prior to surgery if possible. 6. Please bring cases for glasses, contact lenses, hearing aids or dentures. They will likely be removed prior to surgery. 7. Wear casual, loose-fitting and comfortable clothing. Consider that you may have a large dressing to fit under your clothing after surgery. 9. Please do not bring valuables such as jewelry or large sums of cash to the hospital. Remove all body piercings before coming to the hospital. Bereket Galvan may not  wear any rings on the hand if you are having surgery on that hand, wrist or elbow. 10. Do not smoke or chew tobacco before your surgery. [address] [place] [address] and surgery facilities are smoke-free environments. Smoking is not permitted anywhere on campus. 11. Be sure to follow any additional instructions from your physician. If the above conditions are not met, your surgery may be cancelled and rescheduled for another day. Should you develop any change in your health such as fever, cough, sore throat, cold, flu, or infection, or if you have any questions regarding your Pre-admission or surgery, please contact [address] - Surgery Scheduling at [phone], Monday through Friday, 9 a.m. to 5 p.m.

## 2025-03-03 ENCOUNTER — PATIENT MESSAGE (OUTPATIENT)
Dept: FAMILY MEDICINE CLINIC | Age: 25
End: 2025-03-03

## 2025-03-03 DIAGNOSIS — F90.0 ATTENTION DEFICIT HYPERACTIVITY DISORDER (ADHD), PREDOMINANTLY INATTENTIVE TYPE: ICD-10-CM

## 2025-03-04 RX ORDER — DEXTROAMPHETAMINE SULFATE 5 MG/1
5 CAPSULE, EXTENDED RELEASE ORAL DAILY
Qty: 30 CAPSULE | Refills: 0 | Status: SHIPPED | OUTPATIENT
Start: 2025-03-04 | End: 2025-04-03

## 2025-03-10 ENCOUNTER — PATIENT MESSAGE (OUTPATIENT)
Dept: FAMILY MEDICINE CLINIC | Age: 25
End: 2025-03-10

## 2025-03-20 SDOH — ECONOMIC STABILITY: FOOD INSECURITY: WITHIN THE PAST 12 MONTHS, YOU WORRIED THAT YOUR FOOD WOULD RUN OUT BEFORE YOU GOT MONEY TO BUY MORE.: NEVER TRUE

## 2025-03-20 SDOH — ECONOMIC STABILITY: FOOD INSECURITY: WITHIN THE PAST 12 MONTHS, THE FOOD YOU BOUGHT JUST DIDN'T LAST AND YOU DIDN'T HAVE MONEY TO GET MORE.: NEVER TRUE

## 2025-03-20 SDOH — ECONOMIC STABILITY: TRANSPORTATION INSECURITY
IN THE PAST 12 MONTHS, HAS THE LACK OF TRANSPORTATION KEPT YOU FROM MEDICAL APPOINTMENTS OR FROM GETTING MEDICATIONS?: NO

## 2025-03-20 SDOH — ECONOMIC STABILITY: INCOME INSECURITY: IN THE LAST 12 MONTHS, WAS THERE A TIME WHEN YOU WERE NOT ABLE TO PAY THE MORTGAGE OR RENT ON TIME?: NO

## 2025-03-21 ENCOUNTER — TELEMEDICINE (OUTPATIENT)
Dept: FAMILY MEDICINE CLINIC | Age: 25
End: 2025-03-21
Payer: COMMERCIAL

## 2025-03-21 DIAGNOSIS — F90.0 ATTENTION DEFICIT HYPERACTIVITY DISORDER (ADHD), PREDOMINANTLY INATTENTIVE TYPE: Primary | ICD-10-CM

## 2025-03-21 DIAGNOSIS — F41.9 ANXIETY: ICD-10-CM

## 2025-03-21 PROCEDURE — 99214 OFFICE O/P EST MOD 30 MIN: CPT | Performed by: FAMILY MEDICINE

## 2025-03-21 PROCEDURE — G2211 COMPLEX E/M VISIT ADD ON: HCPCS | Performed by: FAMILY MEDICINE

## 2025-03-21 RX ORDER — LISDEXAMFETAMINE DIMESYLATE 30 MG/1
30 CAPSULE ORAL DAILY
Qty: 14 CAPSULE | Refills: 0 | Status: SHIPPED | OUTPATIENT
Start: 2025-03-21 | End: 2025-04-04

## 2025-03-21 SDOH — ECONOMIC STABILITY: FOOD INSECURITY: WITHIN THE PAST 12 MONTHS, THE FOOD YOU BOUGHT JUST DIDN'T LAST AND YOU DIDN'T HAVE MONEY TO GET MORE.: NEVER TRUE

## 2025-03-21 SDOH — ECONOMIC STABILITY: FOOD INSECURITY: WITHIN THE PAST 12 MONTHS, YOU WORRIED THAT YOUR FOOD WOULD RUN OUT BEFORE YOU GOT MONEY TO BUY MORE.: NEVER TRUE

## 2025-03-21 ASSESSMENT — PATIENT HEALTH QUESTIONNAIRE - PHQ9
7. TROUBLE CONCENTRATING ON THINGS, SUCH AS READING THE NEWSPAPER OR WATCHING TELEVISION: SEVERAL DAYS
SUM OF ALL RESPONSES TO PHQ QUESTIONS 1-9: 2
8. MOVING OR SPEAKING SO SLOWLY THAT OTHER PEOPLE COULD HAVE NOTICED. OR THE OPPOSITE, BEING SO FIGETY OR RESTLESS THAT YOU HAVE BEEN MOVING AROUND A LOT MORE THAN USUAL: NOT AT ALL
SUM OF ALL RESPONSES TO PHQ QUESTIONS 1-9: 2
2. FEELING DOWN, DEPRESSED OR HOPELESS: NOT AT ALL
3. TROUBLE FALLING OR STAYING ASLEEP: SEVERAL DAYS
10. IF YOU CHECKED OFF ANY PROBLEMS, HOW DIFFICULT HAVE THESE PROBLEMS MADE IT FOR YOU TO DO YOUR WORK, TAKE CARE OF THINGS AT HOME, OR GET ALONG WITH OTHER PEOPLE: NOT DIFFICULT AT ALL
9. THOUGHTS THAT YOU WOULD BE BETTER OFF DEAD, OR OF HURTING YOURSELF: NOT AT ALL
4. FEELING TIRED OR HAVING LITTLE ENERGY: NOT AT ALL
SUM OF ALL RESPONSES TO PHQ QUESTIONS 1-9: 2
1. LITTLE INTEREST OR PLEASURE IN DOING THINGS: NOT AT ALL
6. FEELING BAD ABOUT YOURSELF - OR THAT YOU ARE A FAILURE OR HAVE LET YOURSELF OR YOUR FAMILY DOWN: NOT AT ALL
SUM OF ALL RESPONSES TO PHQ QUESTIONS 1-9: 2
5. POOR APPETITE OR OVEREATING: NOT AT ALL

## 2025-03-21 NOTE — ASSESSMENT & PLAN NOTE
He has baseline SILVANO and may need to treat that if it becomes a barrier to tolerating the stimulants. Perhaps a beta blocker?      Urged to consider counseling again.  Could do this through work.

## 2025-03-21 NOTE — PROGRESS NOTES
more side effects than beneficial effets (a bit anxious).  Not really helping his classwork.  (Still easily distracted, hears but does not process).  He has used Strattera in 2023.  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  Last traditional stimulant use was in grade school- in 5th grade. Recalls that it did work well for him- helped him to be organized, not procrastinate.  Review of his medical history shows that he used Vyvanse.    OARRS report reviewed; is consistent with prescribed use and free of suspicious activity.         Review of Systems mood stable.       Objective   Patient-Reported Vitals  No data recorded     Physical Exam  Constitutional:       General: He is not in acute distress.     Appearance: Normal appearance. He is not ill-appearing.   HENT:      Head: Normocephalic and atraumatic.   Pulmonary:      Effort: Pulmonary effort is normal.   Musculoskeletal:         General: Normal range of motion.      Cervical back: Normal range of motion.   Skin:     Findings: No rash.   Neurological:      General: No focal deficit present.      Mental Status: He is alert and oriented to person, place, and time. Mental status is at baseline.   Psychiatric:         Mood and Affect: Mood normal.         Behavior: Behavior normal.         Thought Content: Thought content normal.         Judgment: Judgment normal.                  --Jesse Winters MD

## 2025-03-21 NOTE — ASSESSMENT & PLAN NOTE
Try vyvanse for ADHD symptoms- starting 30 mg dose.   Will give 14 day supply so we can adjust dose more quickly.    If does not tolerate vyvanse, adderall could be tried next.    He has baseline SILVANO and may need to treat that if it becomes a barrier to tolerating the stimulants. Perhaps a beta blocker?      Orders:    lisdexamfetamine (VYVANSE) 30 MG capsule; Take 1 capsule by mouth daily for 14 days. Max Daily Amount: 30 mg

## 2025-03-28 ENCOUNTER — PATIENT MESSAGE (OUTPATIENT)
Dept: FAMILY MEDICINE CLINIC | Age: 25
End: 2025-03-28

## 2025-04-10 ENCOUNTER — PATIENT MESSAGE (OUTPATIENT)
Dept: FAMILY MEDICINE CLINIC | Age: 25
End: 2025-04-10

## 2025-04-10 DIAGNOSIS — F90.0 ATTENTION DEFICIT HYPERACTIVITY DISORDER (ADHD), PREDOMINANTLY INATTENTIVE TYPE: Primary | ICD-10-CM

## 2025-04-11 RX ORDER — DEXTROAMPHETAMINE SACCHARATE, AMPHETAMINE ASPARTATE MONOHYDRATE, DEXTROAMPHETAMINE SULFATE, AMPHETAMINE SULFATE 6.25; 6.25; 6.25; 6.25 MG/1; MG/1; MG/1; MG/1
25 CAPSULE, EXTENDED RELEASE ORAL EVERY MORNING
Qty: 30 CAPSULE | Refills: 0 | Status: SHIPPED | OUTPATIENT
Start: 2025-04-11 | End: 2025-05-11

## 2025-04-21 ENCOUNTER — PATIENT MESSAGE (OUTPATIENT)
Dept: FAMILY MEDICINE CLINIC | Age: 25
End: 2025-04-21

## 2025-04-21 DIAGNOSIS — F90.0 ATTENTION DEFICIT HYPERACTIVITY DISORDER (ADHD), PREDOMINANTLY INATTENTIVE TYPE: ICD-10-CM

## 2025-04-21 RX ORDER — TRAZODONE HYDROCHLORIDE 100 MG/1
100 TABLET ORAL NIGHTLY PRN
Qty: 30 TABLET | Refills: 2 | Status: SHIPPED | OUTPATIENT
Start: 2025-04-21

## 2025-04-21 RX ORDER — LISDEXAMFETAMINE DIMESYLATE 40 MG/1
40 CAPSULE ORAL DAILY
Qty: 14 CAPSULE | Refills: 0 | Status: CANCELLED | OUTPATIENT
Start: 2025-04-21 | End: 2025-05-05

## 2025-04-30 ENCOUNTER — PATIENT MESSAGE (OUTPATIENT)
Dept: FAMILY MEDICINE CLINIC | Age: 25
End: 2025-04-30

## 2025-04-30 DIAGNOSIS — F90.0 ATTENTION DEFICIT HYPERACTIVITY DISORDER (ADHD), PREDOMINANTLY INATTENTIVE TYPE: ICD-10-CM

## 2025-05-01 RX ORDER — FLUOXETINE 10 MG/1
10 CAPSULE ORAL DAILY
Qty: 30 CAPSULE | Refills: 1 | Status: SHIPPED | OUTPATIENT
Start: 2025-05-01

## 2025-05-01 RX ORDER — LISDEXAMFETAMINE DIMESYLATE 40 MG/1
40 CAPSULE ORAL DAILY
Qty: 14 CAPSULE | Refills: 0 | Status: SHIPPED | OUTPATIENT
Start: 2025-05-01 | End: 2025-05-15

## 2025-05-20 ENCOUNTER — PATIENT MESSAGE (OUTPATIENT)
Dept: FAMILY MEDICINE CLINIC | Age: 25
End: 2025-05-20

## 2025-05-20 DIAGNOSIS — F90.0 ATTENTION DEFICIT HYPERACTIVITY DISORDER (ADHD), PREDOMINANTLY INATTENTIVE TYPE: ICD-10-CM

## 2025-05-20 RX ORDER — LISDEXAMFETAMINE DIMESYLATE 50 MG/1
50 CAPSULE ORAL DAILY
Qty: 30 CAPSULE | Refills: 0 | Status: CANCELLED | OUTPATIENT
Start: 2025-05-20 | End: 2025-06-19

## 2025-05-20 RX ORDER — LISDEXAMFETAMINE DIMESYLATE 40 MG/1
40 CAPSULE ORAL DAILY
Qty: 30 CAPSULE | Refills: 0 | Status: SHIPPED | OUTPATIENT
Start: 2025-05-20 | End: 2025-06-19

## 2025-07-02 DIAGNOSIS — F90.0 ATTENTION DEFICIT HYPERACTIVITY DISORDER (ADHD), PREDOMINANTLY INATTENTIVE TYPE: ICD-10-CM

## 2025-07-02 RX ORDER — LISDEXAMFETAMINE DIMESYLATE 40 MG/1
40 CAPSULE ORAL DAILY
Qty: 30 CAPSULE | Refills: 0 | Status: SHIPPED | OUTPATIENT
Start: 2025-07-02 | End: 2025-08-01

## 2025-07-02 RX ORDER — TRAZODONE HYDROCHLORIDE 100 MG/1
100 TABLET ORAL NIGHTLY PRN
Qty: 90 TABLET | Refills: 1 | Status: SHIPPED | OUTPATIENT
Start: 2025-07-02

## 2025-07-03 ENCOUNTER — TELEPHONE (OUTPATIENT)
Dept: INTERNAL MEDICINE CLINIC | Age: 25
End: 2025-07-03

## 2025-07-03 NOTE — TELEPHONE ENCOUNTER
----- Message from TRISTEN MONTALVO MA sent at 7/2/2025 12:00 PM EDT -----  Regarding: FW: [facility] Appointment Request    ----- Message -----  From: Chava Avina Jr.  Sent: 7/2/2025  11:58 AM EDT  To: [facility]  Subject: [facility] Appointment Request                          [facility] Appointment Request    Patient needs appointment for [facility] Appointment Type: Existing Condition Follow Up.    Patient Requested Dates(s): July 8th Tuesday   Patient Requested Time: Afternoon or anytime   Provider Name:Jesse Winters MD    Reason for Appointment Request: Established Patient - Available appointments did not meet patient need  --------------------------------------------------------------------------------------------------------------------------    Relationship to Patient: Self     Call Back Information: OK to leave message on voicemail  Preferred Call Back Number: Phone [phone]

## 2025-08-12 ENCOUNTER — OFFICE VISIT (OUTPATIENT)
Dept: FAMILY MEDICINE CLINIC | Age: 25
End: 2025-08-12
Payer: COMMERCIAL

## 2025-08-12 VITALS
HEART RATE: 68 BPM | DIASTOLIC BLOOD PRESSURE: 64 MMHG | WEIGHT: 126 LBS | BODY MASS INDEX: 20.99 KG/M2 | SYSTOLIC BLOOD PRESSURE: 102 MMHG | OXYGEN SATURATION: 97 % | HEIGHT: 65 IN | RESPIRATION RATE: 10 BRPM

## 2025-08-12 DIAGNOSIS — G47.26 SLEEP DISORDER, SHIFT WORK: ICD-10-CM

## 2025-08-12 DIAGNOSIS — F90.0 ATTENTION DEFICIT HYPERACTIVITY DISORDER (ADHD), PREDOMINANTLY INATTENTIVE TYPE: Primary | ICD-10-CM

## 2025-08-12 PROCEDURE — 99214 OFFICE O/P EST MOD 30 MIN: CPT | Performed by: FAMILY MEDICINE

## 2025-08-12 RX ORDER — LISDEXAMFETAMINE DIMESYLATE 50 MG/1
50 CAPSULE ORAL DAILY
Qty: 15 CAPSULE | Refills: 0 | Status: SHIPPED | OUTPATIENT
Start: 2025-08-12 | End: 2025-08-27

## 2025-08-12 RX ORDER — METOPROLOL SUCCINATE 25 MG/1
25 TABLET, EXTENDED RELEASE ORAL DAILY
Qty: 90 TABLET | Refills: 1 | Status: SHIPPED | OUTPATIENT
Start: 2025-08-12

## (undated) DEVICE — UNDERGLOVE SURG SZ 8 FNGR THK0.21MIL GRN LTX BEAD CUF

## (undated) DEVICE — Device

## (undated) DEVICE — STRIP,CLOSURE,WOUND,MEDI-STRIP,1/2X4: Brand: MEDLINE

## (undated) DEVICE — SPONGE GZ W4XL4IN COT 12 PLY TYP VII WVN C FLD DSGN

## (undated) DEVICE — ZIMMER® STERILE DISPOSABLE TOURNIQUET CUFF WITH PLC, DUAL PORT, SINGLE BLADDER, 18 IN. (46 CM)

## (undated) DEVICE — SOLUTION IV IRRIG 250ML ST LF 0.9% SODIUM 2F7122

## (undated) DEVICE — DRAPE HND W114XL142IN BLU POLYPR W O PCH FEN CRD AND TB HLDR

## (undated) DEVICE — BANDAGE COMPR W4INXL12FT E DISP ESMARCH EVEN

## (undated) DEVICE — GLOVE SURG SZ 65 CRM LTX FREE POLYISOPRENE POLYMER BEAD ANTI

## (undated) DEVICE — DECANTER BAG 9": Brand: MEDLINE INDUSTRIES, INC.

## (undated) DEVICE — CHLORAPREP 26ML ORANGE

## (undated) DEVICE — GLOVE SURG SZ 75 CRM LTX FREE POLYISOPRENE POLYMER BEAD ANTI

## (undated) DEVICE — IMPLANTABLE DEVICE
Type: IMPLANTABLE DEVICE | Site: WRIST | Status: NON-FUNCTIONAL
Brand: 0.045" X 6" K-WIRE STANDARD
Removed: 2020-03-12

## (undated) DEVICE — PADDING UNDERCAST W4INXL4YD 100% COT CRIMPED FINISH WBRL II

## (undated) DEVICE — COVER LT HNDL BLU PLAS

## (undated) DEVICE — Z DISCONTINUED USE 2275676 GLOVE SURG SZ 65 L12IN FNGR THK87MIL DK GRN LTX FREE ISOLEX

## (undated) DEVICE — Z DISCONTINUED PER MEDLINE (LOW STOCK)  USE 2422770 DRAPE C ARM W54XL78IN FOR FLROSCN

## (undated) DEVICE — WIRE FIX L152MM DIA16MM S STL 2 DMND PNT K
Type: IMPLANTABLE DEVICE | Site: WRIST | Status: NON-FUNCTIONAL
Removed: 2020-03-12

## (undated) DEVICE — DRESSING PETRO W3XL3IN OIL EMUL N ADH GZ KNIT IMPREG CELOS

## (undated) DEVICE — SYRINGE MED 10ML LUERLOCK TIP W/O SFTY DISP

## (undated) DEVICE — GOWN SIRUS NONREIN XL W/TWL: Brand: MEDLINE INDUSTRIES, INC.

## (undated) DEVICE — TUBING, SUCTION, 1/4" X 12', STRAIGHT: Brand: MEDLINE

## (undated) DEVICE — MINOR SET UP PK

## (undated) DEVICE — SHEET,DRAPE,53X77,STERILE: Brand: MEDLINE

## (undated) DEVICE — BANDAGE COMPR W4INXL15FT BGE E SGL LAYERED CLP CLSR

## (undated) DEVICE — GOWN,SIRUS,POLYRNF,BRTHSLV,XL,30/CS: Brand: MEDLINE

## (undated) DEVICE — .045" X 6" ST GUIDE WIRE: Brand: ACUMED